# Patient Record
Sex: FEMALE | Race: WHITE | NOT HISPANIC OR LATINO | Employment: OTHER | URBAN - NONMETROPOLITAN AREA
[De-identification: names, ages, dates, MRNs, and addresses within clinical notes are randomized per-mention and may not be internally consistent; named-entity substitution may affect disease eponyms.]

---

## 2018-08-29 ENCOUNTER — OFFICE VISIT (OUTPATIENT)
Dept: OBSTETRICS AND GYNECOLOGY | Facility: CLINIC | Age: 53
End: 2018-08-29

## 2018-08-29 VITALS
SYSTOLIC BLOOD PRESSURE: 164 MMHG | HEIGHT: 66 IN | BODY MASS INDEX: 29.57 KG/M2 | DIASTOLIC BLOOD PRESSURE: 100 MMHG | WEIGHT: 184 LBS

## 2018-08-29 DIAGNOSIS — N95.1 MENOPAUSAL SYMPTOMS: Primary | ICD-10-CM

## 2018-08-29 PROCEDURE — 99204 OFFICE O/P NEW MOD 45 MIN: CPT | Performed by: OBSTETRICS & GYNECOLOGY

## 2018-08-29 NOTE — PROGRESS NOTES
Subjective   Chief Complaint   Patient presents with   • HRT     Pt is interested in starting hormones (weight gain, sweating, not sleeping, mood swings, no sex drive)     Nicole Silav is a 53 y.o. year old .  No LMP recorded. Patient is postmenopausal.  She presents to be seen because of vasomotor symptoms. Mood lability, decrease libido, hot flushes. LMP 11 years ago. NO sig cardiac history in family.     PAP smear WNL   MMG WNL 10/17    OTHER COMPLAINTS:  Nothing else    The following portions of the patient's history were reviewed and updated as appropriate:  She  has a past medical history of Hypertension.  She  does not have a problem list on file.  She  has a past surgical history that includes  section (,).  Her family history includes Ovarian cancer in her mother.  She  reports that she has quit smoking. She has never used smokeless tobacco. She reports that she does not drink alcohol or use drugs.  No current outpatient prescriptions on file.     No current facility-administered medications for this visit.      No current outpatient prescriptions on file prior to visit.     No current facility-administered medications on file prior to visit.      She has No Known Allergies.    Smoking status: Former Smoker                                                              Packs/day: 0.00      Years: 0.00      Smokeless tobacco: Never Used                        Review of Systems  Consitutional POS: nothing reported    NEG: anorexia or night sweats   Gastointestinal POS: nothing reported    NEG: bloating, change in bowel habits, melena or reflux symptoms   Genitourinary POS: nothing reported    NEG: dysuria or hematuria   Integument POS: nothing reported    NEG: moles that are changing in size, shape, color or rashes   Breast POS: nothing reported    NEG: persistent breast lump, skin dimpling or nipple discharge         Eyes: negative  Ears, nose, mouth, throat, and face:  "negative  Respiratory: negative  Cardiovascular: negative  GYN:  negative  Hematologic/lymphatic: negative  Musculoskeletal:negative  Neurological: negative  Behavioral/Psych: negative  Endocrine: negative  Allergic/Immunologic: negative          Objective   /100   Ht 167.6 cm (66\")   Wt 83.5 kg (184 lb)   BMI 29.70 kg/m²     General:  well developed; well nourished  no acute distress   Skin:  No suspicious lesions seen   Thyroid: normal to inspection and palpation   Lungs:  breathing is unlabored  clear to auscultation bilaterally   Heart:  regular rate and rhythm, S1, S2 normal, no murmur, click, rub or gallop   Breasts:  Not performed.   Abdomen: soft, non-tender; no masses  no umbilical or inginual hernias are present  no hepato-splenomegaly   Pelvis: Not performed.     Psychiatric: Alert and oriented ×3, mood and affect appropriate  HEENT: Atraumatic, normocephalic, normal scleral icterus  Extremities: 2+ pulses bilaterally, no edema      Lab Review   No data reviewed    Imaging   No data reviewed        Assessment   1. Vasomotor symptoms     Plan   1. Labs otday, if normal will start Climara patch q week, PCP for BP check  2. R/B A  3. 6 weeks      No orders of the defined types were placed in this encounter.         This note was electronically signed.      August 29, 2018      "

## 2018-08-30 ENCOUNTER — TELEPHONE (OUTPATIENT)
Dept: OBSTETRICS AND GYNECOLOGY | Facility: CLINIC | Age: 53
End: 2018-08-30

## 2018-08-30 LAB
ALBUMIN SERPL-MCNC: 4.4 G/DL (ref 3.5–5)
ALBUMIN/GLOB SERPL: 1.8 G/DL (ref 1–2)
ALP SERPL-CCNC: 112 U/L (ref 38–126)
ALT SERPL-CCNC: 32 U/L (ref 13–69)
AST SERPL-CCNC: 27 U/L (ref 15–46)
BILIRUB SERPL-MCNC: 0.6 MG/DL (ref 0.2–1.3)
BUN SERPL-MCNC: 13 MG/DL (ref 7–20)
BUN/CREAT SERPL: 21.7 (ref 7.1–23.5)
CALCIUM SERPL-MCNC: 9.3 MG/DL (ref 8.4–10.2)
CHLORIDE SERPL-SCNC: 110 MMOL/L (ref 98–107)
CO2 SERPL-SCNC: 22 MMOL/L (ref 26–30)
CREAT SERPL-MCNC: 0.6 MG/DL (ref 0.6–1.3)
ERYTHROCYTE [DISTWIDTH] IN BLOOD BY AUTOMATED COUNT: 13.9 % (ref 11.5–14.5)
ESTRADIOL SERPL-MCNC: 10 PG/ML
FSH SERPL-ACNC: 75.5 MIU/ML
GLOBULIN SER CALC-MCNC: 2.4 GM/DL
GLUCOSE SERPL-MCNC: 93 MG/DL (ref 74–98)
HBA1C MFR BLD: 5.5 %
HCT VFR BLD AUTO: 47.3 % (ref 37–47)
HGB BLD-MCNC: 15.6 G/DL (ref 12–16)
MCH RBC QN AUTO: 30.8 PG (ref 27–31)
MCHC RBC AUTO-ENTMCNC: 33 G/DL (ref 30–37)
MCV RBC AUTO: 93.3 FL (ref 81–99)
PLATELET # BLD AUTO: 181 10*3/MM3 (ref 130–400)
POTASSIUM SERPL-SCNC: 3.9 MMOL/L (ref 3.5–5.1)
PROT SERPL-MCNC: 6.8 G/DL (ref 6.3–8.2)
RBC # BLD AUTO: 5.07 10*6/MM3 (ref 4.2–5.4)
SODIUM SERPL-SCNC: 143 MMOL/L (ref 137–145)
TSH SERPL DL<=0.005 MIU/L-ACNC: 1.1 MIU/ML (ref 0.47–4.68)
WBC # BLD AUTO: 8.5 10*3/MM3 (ref 4.8–10.8)

## 2018-12-12 ENCOUNTER — OFFICE VISIT (OUTPATIENT)
Dept: OBSTETRICS AND GYNECOLOGY | Facility: CLINIC | Age: 53
End: 2018-12-12

## 2018-12-12 VITALS
DIASTOLIC BLOOD PRESSURE: 70 MMHG | HEIGHT: 66 IN | SYSTOLIC BLOOD PRESSURE: 118 MMHG | BODY MASS INDEX: 28.93 KG/M2 | WEIGHT: 180 LBS

## 2018-12-12 DIAGNOSIS — N95.1 MENOPAUSAL SYMPTOMS: Primary | ICD-10-CM

## 2018-12-12 PROCEDURE — 99213 OFFICE O/P EST LOW 20 MIN: CPT | Performed by: OBSTETRICS & GYNECOLOGY

## 2018-12-12 RX ORDER — VENLAFAXINE HYDROCHLORIDE 75 MG/1
150 CAPSULE, EXTENDED RELEASE ORAL
COMMUNITY
Start: 2015-08-10

## 2018-12-12 RX ORDER — ALBUTEROL SULFATE 90 UG/1
AEROSOL, METERED RESPIRATORY (INHALATION)
COMMUNITY

## 2018-12-12 RX ORDER — BENZONATATE 100 MG/1
CAPSULE ORAL
COMMUNITY
Start: 2018-11-20

## 2018-12-12 RX ORDER — AZITHROMYCIN 250 MG/1
TABLET, FILM COATED ORAL
COMMUNITY
Start: 2018-11-20

## 2018-12-12 RX ORDER — MONTELUKAST SODIUM 10 MG/1
10 TABLET ORAL
COMMUNITY
Start: 2015-07-31

## 2018-12-12 RX ORDER — ENALAPRIL MALEATE 10 MG/1
10 TABLET ORAL
COMMUNITY
Start: 2016-01-20

## 2018-12-12 RX ORDER — CYCLOBENZAPRINE HCL 10 MG
10 TABLET ORAL
COMMUNITY
Start: 2015-07-31

## 2018-12-12 NOTE — PROGRESS NOTES
Subjective   Chief Complaint   Patient presents with   • Follow-up     HRT- Climara(Started in August)- No Complaints     Nicole Silva is a 53 y.o. year old .  No LMP recorded. Patient is postmenopausal.  She presents to be seen because of F/U CLIMARA PATCH. Doing well, sig reduction in symptoms. BP muich better as well. No Vb'ing    OTHER COMPLAINTS:  Nothing else    The following portions of the patient's history were reviewed and updated as appropriate:  She  has a past medical history of Hypertension.  She does not have a problem list on file.  She  has a past surgical history that includes  section (,).  Her family history includes Ovarian cancer in her mother.  She  reports that she has quit smoking. she has never used smokeless tobacco. She reports that she does not drink alcohol or use drugs.  Current Outpatient Medications   Medication Sig Dispense Refill   • cyclobenzaprine (FLEXERIL) 10 MG tablet Take 10 mg by mouth.     • enalapril (VASOTEC) 10 MG tablet Take 10 mg by mouth.     • HYDROcod Polst-CPM Polst ER (TUSSIONEX PENNKINETIC) 10-8 MG/5ML ER suspension Take 5 mL by mouth.     • montelukast (SINGULAIR) 10 MG tablet Take 10 mg by mouth.     • venlafaxine XR (EFFEXOR-XR) 75 MG 24 hr capsule Take 150 mg by mouth.     • albuterol 108 (90 Base) MCG/ACT inhaler Inhale.     • azithromycin (ZITHROMAX) 250 MG tablet      • benzonatate (TESSALON) 100 MG capsule      • estradiol (CLIMARA) 0.025 MG/24HR patch Place 1 patch on the skin as directed by provider 1 (One) Time Per Week. 4 each 3     No current facility-administered medications for this visit.      Current Outpatient Medications on File Prior to Visit   Medication Sig   • cyclobenzaprine (FLEXERIL) 10 MG tablet Take 10 mg by mouth.   • enalapril (VASOTEC) 10 MG tablet Take 10 mg by mouth.   • HYDROcod Polst-CPM Polst ER (TUSSIONEX PENNKINETIC) 10-8 MG/5ML ER suspension Take 5 mL by mouth.   • montelukast (SINGULAIR) 10 MG tablet  "Take 10 mg by mouth.   • venlafaxine XR (EFFEXOR-XR) 75 MG 24 hr capsule Take 150 mg by mouth.   • albuterol 108 (90 Base) MCG/ACT inhaler Inhale.   • azithromycin (ZITHROMAX) 250 MG tablet    • benzonatate (TESSALON) 100 MG capsule    • estradiol (CLIMARA) 0.025 MG/24HR patch Place 1 patch on the skin as directed by provider 1 (One) Time Per Week.     No current facility-administered medications on file prior to visit.      She has No Known Allergies.    Social History    Tobacco Use      Smoking status: Former Smoker      Smokeless tobacco: Never Used    Review of Systems  Consitutional POS: nothing reported    NEG: anorexia or night sweats   Gastointestinal POS: nothing reported    NEG: bloating, change in bowel habits, melena or reflux symptoms   Genitourinary POS: nothing reported    NEG: dysuria or hematuria   Integument POS: nothing reported    NEG: moles that are changing in size, shape, color or rashes   Breast POS: nothing reported    NEG: persistent breast lump, skin dimpling or nipple discharge         Respiratory: negative  Cardiovascular: negative          Objective   /70   Ht 167.6 cm (66\")   Wt 81.6 kg (180 lb)   BMI 29.05 kg/m²     General:  well developed; well nourished  no acute distress   Skin:  No suspicious lesions seen   Thyroid: not examined   Lungs:  breathing is unlabored   Heart:  Not performed.   Breasts:  Not performed.   Abdomen: soft, non-tender; no masses  no umbilical or inguinal hernias are present  no hepato-splenomegaly   Pelvis: Not performed.     Psychiatric: Alert and oriented ×3, mood and affect appropriate  HEENT: Atraumatic, normocephalic, normal scleral icterus  Extremities: 2+ pulses bilaterally, no edema      Lab Review   No data reviewed    Imaging   No data reviewed        Assessment   1. Vasomotor symptoms imporved, patient has not had hyst-- will need progesterone     Plan   1. Add prometrium 100mg po qday. Cont current dosing Climara 0.025ng patch " weekly  2.  Needs MMG and PAP--getting through PCP    No orders of the defined types were placed in this encounter.         This note was electronically signed.      December 12, 2018

## 2019-01-07 ENCOUNTER — OFFICE VISIT (OUTPATIENT)
Dept: FAMILY MEDICINE CLINIC | Age: 54
End: 2019-01-07
Payer: COMMERCIAL

## 2019-01-07 ENCOUNTER — HOSPITAL ENCOUNTER (OUTPATIENT)
Facility: HOSPITAL | Age: 54
Discharge: HOME | End: 2019-01-07
Payer: COMMERCIAL

## 2019-01-07 VITALS
SYSTOLIC BLOOD PRESSURE: 150 MMHG | DIASTOLIC BLOOD PRESSURE: 90 MMHG | RESPIRATION RATE: 16 BRPM | OXYGEN SATURATION: 96 % | BODY MASS INDEX: 30.39 KG/M2 | WEIGHT: 178 LBS | HEIGHT: 64 IN | HEART RATE: 90 BPM

## 2019-01-07 DIAGNOSIS — I10 ESSENTIAL HYPERTENSION: Primary | ICD-10-CM

## 2019-01-07 DIAGNOSIS — Z12.39 BREAST CANCER SCREENING: ICD-10-CM

## 2019-01-07 DIAGNOSIS — Z13.220 LIPID SCREENING: ICD-10-CM

## 2019-01-07 DIAGNOSIS — Z23 NEED FOR TETANUS BOOSTER: ICD-10-CM

## 2019-01-07 DIAGNOSIS — F41.9 ANXIETY: ICD-10-CM

## 2019-01-07 DIAGNOSIS — J30.89 NON-SEASONAL ALLERGIC RHINITIS, UNSPECIFIED TRIGGER: ICD-10-CM

## 2019-01-07 DIAGNOSIS — Z23 NEED FOR INFLUENZA VACCINATION: ICD-10-CM

## 2019-01-07 DIAGNOSIS — M62.838 CERVICAL PARASPINAL MUSCLE SPASM: ICD-10-CM

## 2019-01-07 DIAGNOSIS — Z12.11 COLON CANCER SCREENING: ICD-10-CM

## 2019-01-07 DIAGNOSIS — I10 ESSENTIAL HYPERTENSION: ICD-10-CM

## 2019-01-07 PROBLEM — G89.29 CHRONIC PAIN OF BOTH KNEES: Status: ACTIVE | Noted: 2019-01-07

## 2019-01-07 PROBLEM — M25.561 CHRONIC PAIN OF BOTH KNEES: Status: ACTIVE | Noted: 2019-01-07

## 2019-01-07 PROBLEM — M25.562 CHRONIC PAIN OF BOTH KNEES: Status: ACTIVE | Noted: 2019-01-07

## 2019-01-07 LAB
A/G RATIO: 2 (ref 0.8–2)
ALBUMIN SERPL-MCNC: 4.8 G/DL (ref 3.4–4.8)
ALP BLD-CCNC: 102 U/L (ref 25–100)
ALT SERPL-CCNC: 21 U/L (ref 4–36)
ANION GAP SERPL CALCULATED.3IONS-SCNC: 14 MMOL/L (ref 3–16)
AST SERPL-CCNC: 23 U/L (ref 8–33)
BASOPHILS ABSOLUTE: 0.1 K/UL (ref 0–0.1)
BASOPHILS RELATIVE PERCENT: 0.7 %
BILIRUB SERPL-MCNC: 0.6 MG/DL (ref 0.3–1.2)
BUN BLDV-MCNC: 15 MG/DL (ref 6–20)
CALCIUM SERPL-MCNC: 9.3 MG/DL (ref 8.5–10.5)
CHLORIDE BLD-SCNC: 102 MMOL/L (ref 98–107)
CHOLESTEROL, TOTAL: 266 MG/DL (ref 0–200)
CO2: 24 MMOL/L (ref 20–30)
CREAT SERPL-MCNC: 0.6 MG/DL (ref 0.4–1.2)
EOSINOPHILS ABSOLUTE: 0.2 K/UL (ref 0–0.4)
EOSINOPHILS RELATIVE PERCENT: 1.7 %
GFR AFRICAN AMERICAN: >59
GFR NON-AFRICAN AMERICAN: >60
GLOBULIN: 2.4 G/DL
GLUCOSE BLD-MCNC: 79 MG/DL (ref 74–106)
HCT VFR BLD CALC: 52.4 % (ref 37–47)
HDLC SERPL-MCNC: 94 MG/DL (ref 40–60)
HEMOGLOBIN: 16.6 G/DL (ref 11.5–16.5)
IMMATURE GRANULOCYTES #: 0.1 K/UL
IMMATURE GRANULOCYTES %: 0.5 % (ref 0–5)
LDL CHOLESTEROL CALCULATED: 154 MG/DL
LYMPHOCYTES ABSOLUTE: 2.7 K/UL (ref 1.5–4)
LYMPHOCYTES RELATIVE PERCENT: 23.7 %
MCH RBC QN AUTO: 30.4 PG (ref 27–32)
MCHC RBC AUTO-ENTMCNC: 31.7 G/DL (ref 31–35)
MCV RBC AUTO: 96 FL (ref 80–100)
MONOCYTES ABSOLUTE: 0.9 K/UL (ref 0.2–0.8)
MONOCYTES RELATIVE PERCENT: 7.7 %
NEUTROPHILS ABSOLUTE: 7.6 K/UL (ref 2–7.5)
NEUTROPHILS RELATIVE PERCENT: 65.7 %
PDW BLD-RTO: 14.3 % (ref 11–16)
PLATELET # BLD: 204 K/UL (ref 150–400)
PMV BLD AUTO: 11.7 FL (ref 6–10)
POTASSIUM SERPL-SCNC: 4.1 MMOL/L (ref 3.4–5.1)
RBC # BLD: 5.46 M/UL (ref 3.8–5.8)
SODIUM BLD-SCNC: 140 MMOL/L (ref 136–145)
TOTAL PROTEIN: 7.2 G/DL (ref 6.4–8.3)
TRIGL SERPL-MCNC: 90 MG/DL (ref 0–249)
TSH REFLEX: 0.53 UIU/ML (ref 0.35–5.5)
VLDLC SERPL CALC-MCNC: 18 MG/DL
WBC # BLD: 11.5 K/UL (ref 4–11)

## 2019-01-07 PROCEDURE — 90472 IMMUNIZATION ADMIN EACH ADD: CPT | Performed by: NURSE PRACTITIONER

## 2019-01-07 PROCEDURE — 3017F COLORECTAL CA SCREEN DOC REV: CPT | Performed by: NURSE PRACTITIONER

## 2019-01-07 PROCEDURE — 90715 TDAP VACCINE 7 YRS/> IM: CPT | Performed by: NURSE PRACTITIONER

## 2019-01-07 PROCEDURE — 90688 IIV4 VACCINE SPLT 0.5 ML IM: CPT | Performed by: NURSE PRACTITIONER

## 2019-01-07 PROCEDURE — 80053 COMPREHEN METABOLIC PANEL: CPT

## 2019-01-07 PROCEDURE — 99214 OFFICE O/P EST MOD 30 MIN: CPT | Performed by: NURSE PRACTITIONER

## 2019-01-07 PROCEDURE — 4004F PT TOBACCO SCREEN RCVD TLK: CPT | Performed by: NURSE PRACTITIONER

## 2019-01-07 PROCEDURE — 36415 COLL VENOUS BLD VENIPUNCTURE: CPT

## 2019-01-07 PROCEDURE — 90471 IMMUNIZATION ADMIN: CPT | Performed by: NURSE PRACTITIONER

## 2019-01-07 PROCEDURE — G8482 FLU IMMUNIZE ORDER/ADMIN: HCPCS | Performed by: NURSE PRACTITIONER

## 2019-01-07 PROCEDURE — 84443 ASSAY THYROID STIM HORMONE: CPT

## 2019-01-07 PROCEDURE — 80061 LIPID PANEL: CPT

## 2019-01-07 PROCEDURE — G8417 CALC BMI ABV UP PARAM F/U: HCPCS | Performed by: NURSE PRACTITIONER

## 2019-01-07 PROCEDURE — 85025 COMPLETE CBC W/AUTO DIFF WBC: CPT

## 2019-01-07 PROCEDURE — G8427 DOCREV CUR MEDS BY ELIG CLIN: HCPCS | Performed by: NURSE PRACTITIONER

## 2019-01-07 RX ORDER — CYCLOBENZAPRINE HCL 10 MG
10 TABLET ORAL
COMMUNITY
Start: 2015-07-31 | End: 2019-02-12 | Stop reason: ALTCHOICE

## 2019-01-07 RX ORDER — VENLAFAXINE HYDROCHLORIDE 37.5 MG/1
37.5 CAPSULE, EXTENDED RELEASE ORAL DAILY
Qty: 30 CAPSULE | Refills: 0 | Status: SHIPPED | OUTPATIENT
Start: 2019-01-07 | End: 2019-02-12 | Stop reason: DRUGHIGH

## 2019-01-07 RX ORDER — TIZANIDINE HYDROCHLORIDE 4 MG/1
4 CAPSULE, GELATIN COATED ORAL 3 TIMES DAILY PRN
Qty: 90 CAPSULE | Refills: 0 | Status: SHIPPED | OUTPATIENT
Start: 2019-01-07 | End: 2019-02-06

## 2019-01-07 RX ORDER — ALBUTEROL SULFATE 90 UG/1
2 AEROSOL, METERED RESPIRATORY (INHALATION) EVERY 6 HOURS PRN
Qty: 1 INHALER | Refills: 3 | Status: SHIPPED | OUTPATIENT
Start: 2019-01-07 | End: 2019-10-15 | Stop reason: SDUPTHER

## 2019-01-07 RX ORDER — MONTELUKAST SODIUM 10 MG/1
10 TABLET ORAL NIGHTLY
Qty: 30 TABLET | Refills: 3 | Status: SHIPPED | OUTPATIENT
Start: 2019-01-07 | End: 2019-05-02 | Stop reason: SDUPTHER

## 2019-01-07 RX ORDER — LISINOPRIL 10 MG/1
10 TABLET ORAL DAILY
Qty: 30 TABLET | Refills: 3 | Status: SHIPPED | OUTPATIENT
Start: 2019-01-07 | End: 2019-05-02 | Stop reason: SDUPTHER

## 2019-01-07 RX ORDER — MONTELUKAST SODIUM 10 MG/1
10 TABLET ORAL
COMMUNITY
Start: 2015-07-31 | End: 2019-01-07 | Stop reason: SDUPTHER

## 2019-01-07 ASSESSMENT — PATIENT HEALTH QUESTIONNAIRE - PHQ9
1. LITTLE INTEREST OR PLEASURE IN DOING THINGS: 1
SUM OF ALL RESPONSES TO PHQ9 QUESTIONS 1 & 2: 2
SUM OF ALL RESPONSES TO PHQ QUESTIONS 1-9: 2
2. FEELING DOWN, DEPRESSED OR HOPELESS: 1
SUM OF ALL RESPONSES TO PHQ QUESTIONS 1-9: 2

## 2019-01-11 DIAGNOSIS — Z12.39 BREAST CANCER SCREENING: Primary | ICD-10-CM

## 2019-01-16 DIAGNOSIS — R92.8 ABNORMAL MAMMOGRAM: Primary | ICD-10-CM

## 2019-01-18 DIAGNOSIS — R92.8 ABNORMAL MAMMOGRAM: ICD-10-CM

## 2019-01-21 ASSESSMENT — ENCOUNTER SYMPTOMS
ALLERGIC/IMMUNOLOGIC NEGATIVE: 1
GASTROINTESTINAL NEGATIVE: 1
EYES NEGATIVE: 1
CHEST TIGHTNESS: 0
SHORTNESS OF BREATH: 0
COUGH: 0

## 2019-01-22 ENCOUNTER — TELEPHONE (OUTPATIENT)
Dept: FAMILY MEDICINE CLINIC | Age: 54
End: 2019-01-22

## 2019-01-22 DIAGNOSIS — N63.0 BREAST NODULE: Primary | ICD-10-CM

## 2019-02-12 ENCOUNTER — OFFICE VISIT (OUTPATIENT)
Dept: FAMILY MEDICINE CLINIC | Age: 54
End: 2019-02-12
Payer: COMMERCIAL

## 2019-02-12 VITALS
SYSTOLIC BLOOD PRESSURE: 122 MMHG | RESPIRATION RATE: 18 BRPM | BODY MASS INDEX: 30.9 KG/M2 | DIASTOLIC BLOOD PRESSURE: 80 MMHG | HEART RATE: 78 BPM | HEIGHT: 64 IN | OXYGEN SATURATION: 99 % | WEIGHT: 181 LBS

## 2019-02-12 DIAGNOSIS — J30.89 NON-SEASONAL ALLERGIC RHINITIS, UNSPECIFIED TRIGGER: ICD-10-CM

## 2019-02-12 DIAGNOSIS — N63.0 BREAST NODULE: ICD-10-CM

## 2019-02-12 DIAGNOSIS — F41.9 ANXIETY: ICD-10-CM

## 2019-02-12 DIAGNOSIS — M17.0 PRIMARY OSTEOARTHRITIS OF BOTH KNEES: Primary | ICD-10-CM

## 2019-02-12 PROBLEM — J30.9 ALLERGIC RHINITIS: Status: ACTIVE | Noted: 2019-02-12

## 2019-02-12 PROCEDURE — 3017F COLORECTAL CA SCREEN DOC REV: CPT | Performed by: NURSE PRACTITIONER

## 2019-02-12 PROCEDURE — 4004F PT TOBACCO SCREEN RCVD TLK: CPT | Performed by: NURSE PRACTITIONER

## 2019-02-12 PROCEDURE — G8482 FLU IMMUNIZE ORDER/ADMIN: HCPCS | Performed by: NURSE PRACTITIONER

## 2019-02-12 PROCEDURE — G8427 DOCREV CUR MEDS BY ELIG CLIN: HCPCS | Performed by: NURSE PRACTITIONER

## 2019-02-12 PROCEDURE — 99213 OFFICE O/P EST LOW 20 MIN: CPT | Performed by: NURSE PRACTITIONER

## 2019-02-12 PROCEDURE — G8417 CALC BMI ABV UP PARAM F/U: HCPCS | Performed by: NURSE PRACTITIONER

## 2019-02-12 RX ORDER — FLUTICASONE PROPIONATE 50 MCG
2 SPRAY, SUSPENSION (ML) NASAL DAILY
Qty: 3 BOTTLE | Refills: 3 | Status: SHIPPED | OUTPATIENT
Start: 2019-02-12 | End: 2019-10-15 | Stop reason: SDUPTHER

## 2019-02-12 RX ORDER — VENLAFAXINE HYDROCHLORIDE 75 MG/1
75 CAPSULE, EXTENDED RELEASE ORAL DAILY
Qty: 60 CAPSULE | Refills: 3 | Status: SHIPPED | OUTPATIENT
Start: 2019-02-12 | End: 2019-10-15

## 2019-02-12 RX ORDER — DICLOFENAC POTASSIUM 50 MG/1
50 TABLET, FILM COATED ORAL 3 TIMES DAILY
Qty: 90 TABLET | Refills: 3 | Status: SHIPPED | OUTPATIENT
Start: 2019-02-12 | End: 2019-10-15

## 2019-02-12 RX ORDER — TIZANIDINE 4 MG/1
TABLET ORAL
COMMUNITY
Start: 2019-01-11 | End: 2020-02-18 | Stop reason: SDUPTHER

## 2019-02-12 ASSESSMENT — ENCOUNTER SYMPTOMS: SHORTNESS OF BREATH: 0

## 2019-05-02 DIAGNOSIS — J30.89 NON-SEASONAL ALLERGIC RHINITIS, UNSPECIFIED TRIGGER: ICD-10-CM

## 2019-05-02 DIAGNOSIS — I10 ESSENTIAL HYPERTENSION: ICD-10-CM

## 2019-05-02 RX ORDER — MONTELUKAST SODIUM 10 MG/1
TABLET ORAL
Qty: 30 TABLET | Refills: 3 | Status: SHIPPED | OUTPATIENT
Start: 2019-05-02 | End: 2019-10-15 | Stop reason: SDUPTHER

## 2019-05-02 RX ORDER — LISINOPRIL 10 MG/1
TABLET ORAL
Qty: 30 TABLET | Refills: 3 | Status: SHIPPED | OUTPATIENT
Start: 2019-05-02 | End: 2019-10-15 | Stop reason: SDUPTHER

## 2019-05-02 NOTE — TELEPHONE ENCOUNTER
Refill request received from pharmacy.  Medication pending for approval.     Patient information below:      LDL Calculated (mg/dL)   Date Value   01/07/2019 154 (H)     AST (U/L)   Date Value   01/07/2019 23     ALT (U/L)   Date Value   01/07/2019 21     BUN (mg/dL)   Date Value   01/07/2019 15      (goal A1C is < 7)   (goal LDL is <100) need 30-50% reduction from baseline     BP Readings from Last 3 Encounters:   02/12/19 122/80   01/07/19 (!) 150/90   01/20/16 128/76    (goal /80)      All Future Testing planned in [facility]:  Lab Frequency Next Occurrence   POCT Fecal Immunochemical Test (FIT) Once 01/28/2019   KHANH DIGITAL SCREEN W OR WO CAD BILATERAL Once 01/11/2019       Last Office Visit With PCP:  2/12/2019      Next Visit Date:  Future Appointments   Date Time Provider Jeaneth Inman   5/13/2019  9:15 AM Donna Flores, APR[address]            Patient Active Problem List:     Primary osteoarthritis of both knees     Postmenopausal HRT (hormone replacement therapy)     Chronic pain of both knees     Allergic rhinitis     Anxiety     Breast nodule

## 2019-10-15 ENCOUNTER — OFFICE VISIT (OUTPATIENT)
Dept: FAMILY MEDICINE CLINIC | Age: 54
End: 2019-10-15
Payer: COMMERCIAL

## 2019-10-15 VITALS
BODY MASS INDEX: 30.22 KG/M2 | RESPIRATION RATE: 20 BRPM | DIASTOLIC BLOOD PRESSURE: 74 MMHG | OXYGEN SATURATION: 98 % | SYSTOLIC BLOOD PRESSURE: 130 MMHG | WEIGHT: 177 LBS | HEIGHT: 64 IN | HEART RATE: 91 BPM

## 2019-10-15 DIAGNOSIS — I10 ESSENTIAL HYPERTENSION: ICD-10-CM

## 2019-10-15 DIAGNOSIS — Z23 NEEDS FLU SHOT: ICD-10-CM

## 2019-10-15 DIAGNOSIS — R53.83 FATIGUE, UNSPECIFIED TYPE: ICD-10-CM

## 2019-10-15 DIAGNOSIS — G47.00 INSOMNIA, UNSPECIFIED TYPE: ICD-10-CM

## 2019-10-15 DIAGNOSIS — Z13.220 SCREENING FOR CHOLESTEROL LEVEL: ICD-10-CM

## 2019-10-15 DIAGNOSIS — E55.9 VITAMIN D DEFICIENCY: ICD-10-CM

## 2019-10-15 DIAGNOSIS — F41.9 ANXIETY: ICD-10-CM

## 2019-10-15 DIAGNOSIS — M17.0 PRIMARY OSTEOARTHRITIS OF BOTH KNEES: ICD-10-CM

## 2019-10-15 DIAGNOSIS — J30.89 NON-SEASONAL ALLERGIC RHINITIS, UNSPECIFIED TRIGGER: ICD-10-CM

## 2019-10-15 DIAGNOSIS — F32.A DEPRESSION, UNSPECIFIED DEPRESSION TYPE: Primary | ICD-10-CM

## 2019-10-15 PROCEDURE — G8482 FLU IMMUNIZE ORDER/ADMIN: HCPCS | Performed by: NURSE PRACTITIONER

## 2019-10-15 PROCEDURE — G8417 CALC BMI ABV UP PARAM F/U: HCPCS | Performed by: NURSE PRACTITIONER

## 2019-10-15 PROCEDURE — 99214 OFFICE O/P EST MOD 30 MIN: CPT | Performed by: NURSE PRACTITIONER

## 2019-10-15 PROCEDURE — 90471 IMMUNIZATION ADMIN: CPT | Performed by: NURSE PRACTITIONER

## 2019-10-15 PROCEDURE — 3017F COLORECTAL CA SCREEN DOC REV: CPT | Performed by: NURSE PRACTITIONER

## 2019-10-15 PROCEDURE — 90688 IIV4 VACCINE SPLT 0.5 ML IM: CPT | Performed by: NURSE PRACTITIONER

## 2019-10-15 PROCEDURE — G8427 DOCREV CUR MEDS BY ELIG CLIN: HCPCS | Performed by: NURSE PRACTITIONER

## 2019-10-15 PROCEDURE — 4004F PT TOBACCO SCREEN RCVD TLK: CPT | Performed by: NURSE PRACTITIONER

## 2019-10-15 RX ORDER — DICLOFENAC POTASSIUM 50 MG/1
50 TABLET, FILM COATED ORAL 3 TIMES DAILY
Qty: 90 TABLET | Refills: 3 | Status: CANCELLED | OUTPATIENT
Start: 2019-10-15

## 2019-10-15 RX ORDER — LORATADINE 10 MG/1
10 TABLET ORAL DAILY
Qty: 90 TABLET | Refills: 0 | Status: SHIPPED | OUTPATIENT
Start: 2019-10-15 | End: 2020-02-18

## 2019-10-15 RX ORDER — LISINOPRIL 10 MG/1
TABLET ORAL
Qty: 30 TABLET | Refills: 3 | Status: SHIPPED | OUTPATIENT
Start: 2019-10-15 | End: 2020-02-17

## 2019-10-15 RX ORDER — CLONAZEPAM 0.5 MG/1
0.5 TABLET, ORALLY DISINTEGRATING ORAL 2 TIMES DAILY PRN
Qty: 60 TABLET | Refills: 1 | Status: SHIPPED | OUTPATIENT
Start: 2019-10-15 | End: 2020-01-06

## 2019-10-15 RX ORDER — VENLAFAXINE HYDROCHLORIDE 75 MG/1
75 CAPSULE, EXTENDED RELEASE ORAL DAILY
Qty: 60 CAPSULE | Refills: 3 | Status: CANCELLED | OUTPATIENT
Start: 2019-10-15

## 2019-10-15 RX ORDER — FLUTICASONE PROPIONATE 50 MCG
2 SPRAY, SUSPENSION (ML) NASAL DAILY
Qty: 3 BOTTLE | Refills: 3 | Status: SHIPPED | OUTPATIENT
Start: 2019-10-15 | End: 2020-02-18 | Stop reason: SDUPTHER

## 2019-10-15 RX ORDER — VILAZODONE HYDROCHLORIDE 20 MG/1
20 TABLET ORAL DAILY
Qty: 90 TABLET | Refills: 0 | Status: SHIPPED | OUTPATIENT
Start: 2019-10-15 | End: 2020-02-18 | Stop reason: SDUPTHER

## 2019-10-15 RX ORDER — MONTELUKAST SODIUM 10 MG/1
TABLET ORAL
Qty: 30 TABLET | Refills: 3 | Status: SHIPPED | OUTPATIENT
Start: 2019-10-15 | End: 2020-02-17

## 2019-10-15 RX ORDER — ALBUTEROL SULFATE 90 UG/1
2 AEROSOL, METERED RESPIRATORY (INHALATION) EVERY 6 HOURS PRN
Qty: 1 INHALER | Refills: 3 | Status: SHIPPED | OUTPATIENT
Start: 2019-10-15 | End: 2020-02-18 | Stop reason: SDUPTHER

## 2019-10-15 ASSESSMENT — ENCOUNTER SYMPTOMS
EYE REDNESS: 0
EYE PAIN: 0
SORE THROAT: 0
VOMITING: 0
NAUSEA: 0
COUGH: 0
ABDOMINAL PAIN: 0
DIARRHEA: 0
SHORTNESS OF BREATH: 0
CHEST TIGHTNESS: 0
TROUBLE SWALLOWING: 0
COLOR CHANGE: 0
BACK PAIN: 0

## 2020-01-06 ENCOUNTER — TELEPHONE (OUTPATIENT)
Dept: FAMILY MEDICINE CLINIC | Age: 55
End: 2020-01-06

## 2020-01-06 RX ORDER — CLONAZEPAM 0.5 MG/1
TABLET ORAL
Qty: 60 TABLET | Refills: 0 | Status: SHIPPED | OUTPATIENT
Start: 2020-01-06 | End: 2020-02-18 | Stop reason: SDUPTHER

## 2020-01-06 NOTE — TELEPHONE ENCOUNTER
Refill request received from pharmacy. Medication pending for approval.       Last Office Visit With PCP:  10/15/2019    Next Visit Date:  No future appointments.     Abdirizak Barr for review

## 2020-02-17 RX ORDER — LISINOPRIL 10 MG/1
TABLET ORAL
Qty: 30 TABLET | Refills: 2 | Status: SHIPPED | OUTPATIENT
Start: 2020-02-17 | End: 2020-05-18 | Stop reason: SDUPTHER

## 2020-02-17 RX ORDER — MONTELUKAST SODIUM 10 MG/1
TABLET ORAL
Qty: 30 TABLET | Refills: 2 | Status: SHIPPED | OUTPATIENT
Start: 2020-02-17 | End: 2020-05-18 | Stop reason: SDUPTHER

## 2020-02-18 ENCOUNTER — OFFICE VISIT (OUTPATIENT)
Dept: FAMILY MEDICINE CLINIC | Age: 55
End: 2020-02-18
Payer: MEDICAID

## 2020-02-18 VITALS
WEIGHT: 177 LBS | SYSTOLIC BLOOD PRESSURE: 128 MMHG | BODY MASS INDEX: 30.22 KG/M2 | RESPIRATION RATE: 18 BRPM | OXYGEN SATURATION: 98 % | HEIGHT: 64 IN | HEART RATE: 76 BPM | DIASTOLIC BLOOD PRESSURE: 78 MMHG

## 2020-02-18 PROCEDURE — 99214 OFFICE O/P EST MOD 30 MIN: CPT | Performed by: NURSE PRACTITIONER

## 2020-02-18 RX ORDER — CLONAZEPAM 0.5 MG/1
0.5 TABLET ORAL 3 TIMES DAILY PRN
Qty: 90 TABLET | Refills: 0 | Status: SHIPPED | OUTPATIENT
Start: 2020-02-18 | End: 2020-03-26

## 2020-02-18 RX ORDER — CLONAZEPAM 0.5 MG/1
TABLET ORAL
Qty: 60 TABLET | Refills: 0 | Status: SHIPPED | OUTPATIENT
Start: 2020-02-18 | End: 2020-02-18

## 2020-02-18 RX ORDER — TIZANIDINE 4 MG/1
4 TABLET ORAL EVERY 6 HOURS PRN
Qty: 90 TABLET | Refills: 2 | Status: SHIPPED | OUTPATIENT
Start: 2020-02-18 | End: 2021-04-08

## 2020-02-18 RX ORDER — FLUTICASONE PROPIONATE 50 MCG
2 SPRAY, SUSPENSION (ML) NASAL DAILY
Qty: 3 BOTTLE | Refills: 3 | Status: SHIPPED | OUTPATIENT
Start: 2020-02-18 | End: 2020-06-01

## 2020-02-18 RX ORDER — VILAZODONE HYDROCHLORIDE 20 MG/1
20 TABLET ORAL DAILY
Qty: 90 TABLET | Refills: 0 | Status: SHIPPED | OUTPATIENT
Start: 2020-02-18 | End: 2021-04-08

## 2020-02-18 RX ORDER — ALBUTEROL SULFATE 90 UG/1
2 AEROSOL, METERED RESPIRATORY (INHALATION) EVERY 6 HOURS PRN
Qty: 1 INHALER | Refills: 3 | Status: SHIPPED | OUTPATIENT
Start: 2020-02-18 | End: 2020-05-18 | Stop reason: SDUPTHER

## 2020-02-18 ASSESSMENT — ENCOUNTER SYMPTOMS
BACK PAIN: 0
VOMITING: 0
DIARRHEA: 0
COLOR CHANGE: 0
EYE PAIN: 0
NAUSEA: 0
CHEST TIGHTNESS: 0
TROUBLE SWALLOWING: 0
COUGH: 0
SHORTNESS OF BREATH: 0
EYE REDNESS: 0
SORE THROAT: 0
ABDOMINAL PAIN: 0

## 2020-02-18 ASSESSMENT — PATIENT HEALTH QUESTIONNAIRE - PHQ9
1. LITTLE INTEREST OR PLEASURE IN DOING THINGS: 1
SUM OF ALL RESPONSES TO PHQ QUESTIONS 1-9: 2
2. FEELING DOWN, DEPRESSED OR HOPELESS: 1
SUM OF ALL RESPONSES TO PHQ9 QUESTIONS 1 & 2: 2
SUM OF ALL RESPONSES TO PHQ QUESTIONS 1-9: 2

## 2020-02-18 NOTE — PROGRESS NOTES
m)   Wt 177 lb (80.3 kg)   SpO2 98% Comment: room air\  Breastfeeding No   BMI 30.38 kg/m²       Physical Exam  Vitals signs and nursing note reviewed. Constitutional:       General: She is not in acute distress. Appearance: Normal appearance. She is well-developed. She is not ill-appearing, toxic-appearing or diaphoretic. HENT:      Head: Normocephalic and atraumatic. Right Ear: Hearing, tympanic membrane, ear canal and external ear normal. There is no impacted cerumen. Left Ear: Hearing, tympanic membrane, ear canal and external ear normal. There is no impacted cerumen. Nose: Nose normal. No laceration, mucosal edema, congestion or rhinorrhea. Right Sinus: No maxillary sinus tenderness or frontal sinus tenderness. Left Sinus: No maxillary sinus tenderness or frontal sinus tenderness. Mouth/Throat:      Mouth: Mucous membranes are moist.      Dentition: Normal dentition. No dental caries. Pharynx: Oropharynx is clear. Uvula midline. No oropharyngeal exudate or posterior oropharyngeal erythema. Tonsils: No tonsillar exudate. Eyes:      General: Lids are normal. No scleral icterus. Right eye: No discharge. Left eye: No discharge. Extraocular Movements: Extraocular movements intact. Conjunctiva/sclera: Conjunctivae normal.      Pupils: Pupils are equal, round, and reactive to light. Neck:      Musculoskeletal: Full passive range of motion without pain, normal range of motion and neck supple. Thyroid: No thyroid mass or thyromegaly. Vascular: Normal carotid pulses. No carotid bruit, hepatojugular reflux or JVD. Trachea: Trachea and phonation normal. No tracheal tenderness or tracheal deviation. Cardiovascular:      Rate and Rhythm: Normal rate and regular rhythm. Pulses: Normal pulses. Heart sounds: Normal heart sounds, S1 normal and S2 normal. Heart sounds not distant. No murmur. No friction rub. No gallop. Lab Results   Component Value Date    TSH 0.70 05/08/2014          ASSESSMENT/PLAN:     Diandra Gonzalez was seen today for depression and hypertension. Diagnoses and all orders for this visit:    Anxiety  -     Discontinue: clonazePAM (KLONOPIN) 0.5 MG tablet; DISSOLVE ONE TABLET BY MOUTH TWICE A DAY AS NEEDED FOR ANXIETY  -     clonazePAM (KLONOPIN) 0.5 MG tablet; Take 1 tablet by mouth 3 times daily as needed (insomnia, panic attacks.) for up to 30 days. DC previously sent BID order. Depression, unspecified depression type  -     Discontinue: clonazePAM (KLONOPIN) 0.5 MG tablet; DISSOLVE ONE TABLET BY MOUTH TWICE A DAY AS NEEDED FOR ANXIETY  -     vilazodone HCl (VIIBRYD) 20 MG TABS; Take 1 tablet by mouth daily  -     clonazePAM (KLONOPIN) 0.5 MG tablet; Take 1 tablet by mouth 3 times daily as needed (insomnia, panic attacks.) for up to 30 days. DC previously sent BID order. Insomnia, unspecified type  -     Discontinue: clonazePAM (KLONOPIN) 0.5 MG tablet; DISSOLVE ONE TABLET BY MOUTH TWICE A DAY AS NEEDED FOR ANXIETY  -     clonazePAM (KLONOPIN) 0.5 MG tablet; Take 1 tablet by mouth 3 times daily as needed (insomnia, panic attacks.) for up to 30 days. DC previously sent BID order. Non-seasonal allergic rhinitis, unspecified trigger  -     fluticasone (FLONASE) 50 MCG/ACT nasal spray; 2 sprays by Each Nostril route daily  -     albuterol sulfate  (90 Base) MCG/ACT inhaler; Inhale 2 puffs into the lungs every 6 hours as needed for Wheezing    Primary osteoarthritis of both knees  -     diclofenac (VOLTAREN) 50 MG EC tablet; Take 1 tablet by mouth 2 times daily    Other orders  -     tiZANidine (ZANAFLEX) 4 MG tablet; Take 1 tablet by mouth every 6 hours as needed (muscle spasms)  -     estradiol (CLIMARA) 0.025 MG/24HR; Place 1 patch onto the skin once a week  -     progesterone (PROMETRIUM) 100 MG capsule;  Take 1 capsule by mouth daily        Controlled Substances Monitoring:     RX Monitoring 2/18/2020   Attestation The Prescription Monitoring Report for this patient was reviewed today. Periodic Controlled Substance Monitoring -        PATIENT COUNSELING     Counseling was provided today regarding the following topics: Healthy eating habits, Regular exercise, substance abuse and healthy sleep habits. Discussed use, benefit, and side effects of prescribed medications. Barriers to medication compliance addressed. All patient questions answered. Patient voiced understanding. Medications Discontinued During This Encounter   Medication Reason    loratadine (CLARITIN) 10 MG tablet LIST CLEANUP    clonazePAM (KLONOPIN) 0.5 MG tablet REORDER    fluticasone (FLONASE) 50 MCG/ACT nasal spray REORDER    albuterol sulfate  (90 Base) MCG/ACT inhaler REORDER    diclofenac (VOLTAREN) 50 MG EC tablet REORDER    vilazodone HCl (VIIBRYD) 20 MG TABS REORDER    tiZANidine (ZANAFLEX) 4 MG tablet REORDER    estradiol (CLIMARA) 0.025 MG/24HR REORDER    progesterone (PROMETRIUM) 100 MG capsule REORDER    clonazePAM (KLONOPIN) 0.5 MG tablet        Return in about 3 months (around 5/18/2020). SANTINO Oseguera - CNP     Education was provided for discussed topics. Call the office with worsening complaints or any side effects to any medications. If an emergency please call 911.

## 2020-03-26 RX ORDER — CLONAZEPAM 0.5 MG/1
TABLET ORAL
Qty: 90 TABLET | Refills: 0 | Status: SHIPPED | OUTPATIENT
Start: 2020-03-26 | End: 2020-05-05

## 2020-05-05 PROCEDURE — G2012 BRIEF CHECK IN BY MD/QHP: HCPCS | Performed by: NURSE PRACTITIONER

## 2020-05-05 RX ORDER — CLONAZEPAM 0.5 MG/1
TABLET ORAL
Qty: 90 TABLET | Refills: 0 | Status: SHIPPED | OUTPATIENT
Start: 2020-05-05 | End: 2020-05-18

## 2020-05-05 NOTE — TELEPHONE ENCOUNTER
Patient called, requested refill. Last Office Visit Date:  2/18/2020    Next Office Visit Date:  Future Appointments   Date Time Provider Jeaneth Inman   5/18/2020  8:30 AM SANTINO Gray CNP [address] 5/5/2020    \[facility]\"" code : Brief communication technology-based service, e.g. virtual check-in, by a physician or other qualified health care professional who can report evaluation and management services, provided to an established patient, not originating from a related e/m service provided within the previous 7 days nor leading to an e/m service or procedure within the next 24 hours or soonest available appointment; 5-10 minutes of medical discussion. Jose Rafael Evin was seen today for medication refill.     Diagnoses and all orders for this visit:    Anxiety  -     clonazePAM (KLONOPIN) 0.5 MG tablet; TAKE ONE TABLET BY MOUTH THREE TIMES A DAY AS NEEDED FOR INSOMNIA/ PANIC ATTACKS FOR UP TO 30 DAYS    Depression, unspecified depression type  -     clonazePAM (KLONOPIN) 0.5 MG tablet; TAKE ONE TABLET BY MOUTH THREE TIMES A DAY AS NEEDED FOR INSOMNIA/ PANIC ATTACKS FOR UP TO 30 DAYS    Insomnia, unspecified type  -     clonazePAM (KLONOPIN) 0.5 MG tablet; TAKE ONE TABLET BY MOUTH THREE TIMES A DAY AS NEEDED FOR INSOMNIA/ PANIC ATTACKS FOR UP TO 30 DAYS

## 2020-05-18 ENCOUNTER — VIRTUAL VISIT (OUTPATIENT)
Dept: FAMILY MEDICINE CLINIC | Age: 55
End: 2020-05-18
Payer: MEDICAID

## 2020-05-18 PROCEDURE — 99214 OFFICE O/P EST MOD 30 MIN: CPT | Performed by: NURSE PRACTITIONER

## 2020-05-18 RX ORDER — MONTELUKAST SODIUM 10 MG/1
TABLET ORAL
Qty: 30 TABLET | Refills: 2 | Status: SHIPPED | OUTPATIENT
Start: 2020-05-18 | End: 2020-08-13

## 2020-05-18 RX ORDER — LISINOPRIL 10 MG/1
TABLET ORAL
Qty: 30 TABLET | Refills: 2 | Status: SHIPPED | OUTPATIENT
Start: 2020-05-18 | End: 2020-08-13

## 2020-05-18 RX ORDER — CLONAZEPAM 1 MG/1
1 TABLET ORAL 3 TIMES DAILY PRN
Qty: 90 TABLET | Refills: 0 | Status: SHIPPED | OUTPATIENT
Start: 2020-05-18 | End: 2020-06-18 | Stop reason: SDUPTHER

## 2020-05-18 RX ORDER — ALBUTEROL SULFATE 90 UG/1
2 AEROSOL, METERED RESPIRATORY (INHALATION) EVERY 6 HOURS PRN
Qty: 1 INHALER | Refills: 3 | Status: SHIPPED | OUTPATIENT
Start: 2020-05-18 | End: 2020-07-21 | Stop reason: SDUPTHER

## 2020-05-18 ASSESSMENT — ENCOUNTER SYMPTOMS
BACK PAIN: 0
COLOR CHANGE: 0
VOMITING: 0
EYE PAIN: 0
DIARRHEA: 0
SORE THROAT: 0
COUGH: 0
CHEST TIGHTNESS: 0
NAUSEA: 0
EYE REDNESS: 0
TROUBLE SWALLOWING: 0
ABDOMINAL PAIN: 0
SHORTNESS OF BREATH: 0

## 2020-05-18 NOTE — PROGRESS NOTES
SECTION      times 2    PATELLA SURGERY      plastic      Family History   Problem Relation Age of Onset    Cancer Mother         ovarian and liver    Stroke Mother     Cancer Father         liver    Cancer Paternal Grandmother         kidney    Breast Cancer Sister        Patient's medications, allergies, past medical, surgical, social and family histories were reviewed and updated as appropriate. Current Outpatient Medications on File Prior to Visit   Medication Sig Dispense Refill    fluticasone (FLONASE) 50 MCG/ACT nasal spray 2 sprays by Each Nostril route daily 3 Bottle 3    diclofenac (VOLTAREN) 50 MG EC tablet Take 1 tablet by mouth 2 times daily 60 tablet 3    vilazodone HCl (VIIBRYD) 20 MG TABS Take 1 tablet by mouth daily 90 tablet 0    tiZANidine (ZANAFLEX) 4 MG tablet Take 1 tablet by mouth every 6 hours as needed (muscle spasms) 90 tablet 2     No current facility-administered medications on file prior to visit. Review of Systems   Constitutional: Negative for activity change, appetite change, chills and fever. HENT: Negative for congestion, ear pain, nosebleeds, sore throat and trouble swallowing. Eyes: Negative for pain and redness. Respiratory: Negative for cough, chest tightness and shortness of breath. Cardiovascular: Negative for chest pain, palpitations and leg swelling. Gastrointestinal: Negative for abdominal pain, diarrhea, nausea and vomiting. Genitourinary: Negative for difficulty urinating, dysuria and flank pain. Musculoskeletal: Negative for arthralgias, back pain and gait problem. Skin: Negative for color change, pallor, rash and wound. Allergic/Immunologic: Positive for environmental allergies. Negative for food allergies. Neurological: Negative for dizziness, numbness and headaches. Hematological: Negative for adenopathy. Does not bruise/bleed easily.    Psychiatric/Behavioral: Positive for agitation, decreased concentration and sleep disturbance. The patient is nervous/anxious and is hyperactive. OBJECTIVE:  There were no vitals taken for this visit. UNABLE TO PERFORM THIS SINCE THIS ENCOUNTER IS VIA TELEPHONE DUE TO LIMITING EXPOSURE TO COVID-19 PER [employer] POLICIES AND PROCEDURES. No results found for requested labs within last 30 days. LDL Calculated (mg/dL)   Date Value   01/07/2019 154 (H)         Lab Results   Component Value Date    WBC 11.5 01/07/2019    NEUTROABS 7.6 01/07/2019    HGB 16.6 01/07/2019    HCT 52.4 01/07/2019    MCV 96.0 01/07/2019     01/07/2019       Lab Results   Component Value Date    TSH 0.70 05/08/2014          ASSESSMENT/PLAN:     Sajan Pedro was seen today for 1 month follow-up. Diagnoses and all orders for this visit:    Anxiety  -     clonazePAM (KLONOPIN) 1 MG tablet; Take 1 tablet by mouth 3 times daily as needed for Anxiety (insomnia) for up to 30 days. Depression, unspecified depression type  -     clonazePAM (KLONOPIN) 1 MG tablet; Take 1 tablet by mouth 3 times daily as needed for Anxiety (insomnia) for up to 30 days. Insomnia, unspecified type  -     clonazePAM (KLONOPIN) 1 MG tablet; Take 1 tablet by mouth 3 times daily as needed for Anxiety (insomnia) for up to 30 days. Essential hypertension  -     lisinopril (PRINIVIL;ZESTRIL) 10 MG tablet; TAKE ONE TABLET BY MOUTH DAILY    Non-seasonal allergic rhinitis, unspecified trigger  -     albuterol sulfate  (90 Base) MCG/ACT inhaler; Inhale 2 puffs into the lungs every 6 hours as needed for Wheezing  -     montelukast (SINGULAIR) 10 MG tablet; TAKE ONE TABLET BY MOUTH ONCE NIGHTLY    Menopause  -     progesterone (PROMETRIUM) 100 MG capsule; Take 1 capsule by mouth daily  -     estradiol (CLIMARA) 0.025 MG/24HR; Place 1 patch onto the skin once a week        Controlled Substances Monitoring:     RX Monitoring 5/18/2020   Attestation The Prescription Monitoring Report for this patient was reviewed today. Periodic Controlled Substance Monitoring -        PATIENT COUNSELING     Counseling was provided today regarding the following topics: Healthy eating habits, Regular exercise, substance abuse and healthy sleep habits. Discussed use, benefit, and side effects of prescribed medications. Barriers to medication compliance addressed. All patient questions answered. Patient voiced understanding. Medications Discontinued During This Encounter   Medication Reason    clonazePAM (KLONOPIN) 0.5 MG tablet     albuterol sulfate  (90 Base) MCG/ACT inhaler REORDER    progesterone (PROMETRIUM) 100 MG capsule REORDER    estradiol (CLIMARA) 0.025 MG/24HR REORDER    montelukast (SINGULAIR) 10 MG tablet REORDER    lisinopril (PRINIVIL;ZESTRIL) 10 MG tablet REORDER       Return in about 1 month (around 6/18/2020). SANTINO Nicholson CNP     Education was provided for discussed topics. Call the office with worsening complaints or any side effects to any medications. If an emergency please call 911.

## 2020-06-01 RX ORDER — FLUTICASONE PROPIONATE 50 MCG
SPRAY, SUSPENSION (ML) NASAL
Qty: 1 BOTTLE | Refills: 2 | Status: SHIPPED | OUTPATIENT
Start: 2020-06-01 | End: 2020-07-21 | Stop reason: SDUPTHER

## 2020-06-18 ENCOUNTER — VIRTUAL VISIT (OUTPATIENT)
Dept: FAMILY MEDICINE CLINIC | Age: 55
End: 2020-06-18
Payer: MEDICAID

## 2020-06-18 PROCEDURE — 99213 OFFICE O/P EST LOW 20 MIN: CPT | Performed by: NURSE PRACTITIONER

## 2020-06-18 RX ORDER — CLONAZEPAM 1 MG/1
1 TABLET ORAL 3 TIMES DAILY PRN
Qty: 90 TABLET | Refills: 0 | Status: SHIPPED | OUTPATIENT
Start: 2020-06-18 | End: 2020-07-14 | Stop reason: SDUPTHER

## 2020-06-18 ASSESSMENT — ENCOUNTER SYMPTOMS
CHEST TIGHTNESS: 0
EYE REDNESS: 0
DIARRHEA: 0
TROUBLE SWALLOWING: 0
BACK PAIN: 0
COLOR CHANGE: 0
COUGH: 0
VOMITING: 0
SHORTNESS OF BREATH: 0
SORE THROAT: 0
NAUSEA: 0
EYE PAIN: 0
ABDOMINAL PAIN: 0

## 2020-07-14 RX ORDER — CLONAZEPAM 1 MG/1
1 TABLET ORAL 3 TIMES DAILY PRN
Qty: 90 TABLET | Refills: 0 | Status: SHIPPED | OUTPATIENT
Start: 2020-07-14 | End: 2020-07-21 | Stop reason: SDUPTHER

## 2020-07-14 NOTE — TELEPHONE ENCOUNTER
Patient called, requested refill.      Last Office Visit Date:  6/18/2020    Next Office Visit Date:  Future Appointments   Date Time Provider Jeaneth Inman   7/21/2020  9:30 AM SANTINO Lombardi CNP [facility] [place] [address]ODRIGUE[address] Up to date

## 2020-07-21 ENCOUNTER — VIRTUAL VISIT (OUTPATIENT)
Dept: FAMILY MEDICINE CLINIC | Age: 55
End: 2020-07-21
Payer: MEDICAID

## 2020-07-21 PROCEDURE — 99213 OFFICE O/P EST LOW 20 MIN: CPT | Performed by: NURSE PRACTITIONER

## 2020-07-21 RX ORDER — CLONAZEPAM 1 MG/1
1 TABLET ORAL 3 TIMES DAILY PRN
Qty: 90 TABLET | Refills: 0 | Status: SHIPPED | OUTPATIENT
Start: 2020-07-21 | End: 2020-09-14 | Stop reason: SDUPTHER

## 2020-07-21 RX ORDER — ALBUTEROL SULFATE 90 UG/1
2 AEROSOL, METERED RESPIRATORY (INHALATION) EVERY 6 HOURS PRN
Qty: 1 INHALER | Refills: 3 | Status: SHIPPED | OUTPATIENT
Start: 2020-07-21 | End: 2020-11-19 | Stop reason: SDUPTHER

## 2020-07-21 RX ORDER — FLUTICASONE PROPIONATE 50 MCG
SPRAY, SUSPENSION (ML) NASAL
Qty: 1 BOTTLE | Refills: 2 | Status: SHIPPED | OUTPATIENT
Start: 2020-07-21 | End: 2020-11-19 | Stop reason: SDUPTHER

## 2020-07-21 ASSESSMENT — ENCOUNTER SYMPTOMS
VOMITING: 0
EYE PAIN: 0
COUGH: 0
TROUBLE SWALLOWING: 0
COLOR CHANGE: 0
BACK PAIN: 0
ABDOMINAL PAIN: 0
CHEST TIGHTNESS: 0
SORE THROAT: 0
EYE REDNESS: 0
DIARRHEA: 0
NAUSEA: 0

## 2020-07-21 NOTE — PROGRESS NOTES
SUBJECTIVE:    Patient ID: Lisa Celeste is a 49 y.o. female. Chief Complaint   Patient presents with    Follow-up     Virtual Video encounter with the patient for 1 month follow-up regarding anxiety and depression, Panic attacks, insomnia and seasonal allergies and asthma. Patient reports her overall health status is stable at this time. She is requesting refills on her Klonopin that she takes as needed for anxiety and depression and panic attacks and at nighttime for insomnia. 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 Patient requesting refills on allergy medication for seasonal allergies and albuterol inhaler for asthma. Patient reports she is staying hydrated as she works out in the heat in her gardens. Been taking care of her grandchildren and swimming a lot in the pool. Patient denies any acute complaints at this time. Patient does report a few days ago having some heartburn symptoms but that has subsided this morning. She will follow-up if this continues.   Patient requesting medications be sent to [facility] in [place]     Active Ambulatory Problems     Diagnosis Date Noted    Primary osteoarthritis of both knees 2016    Postmenopausal HRT (hormone replacement therapy) 2016    Chronic pain of both knees 2019    Allergic rhinitis 2019    Anxiety 2019    Breast nodule 2019     Resolved Ambulatory Problems     Diagnosis Date Noted    Acute maxillary sinusitis 2016    Essential hypertension 2016     Past Medical History:   Diagnosis Date    Hypertension       No Known Allergies   Past Surgical History:   Procedure Laterality Date     SECTION      times 2    PATELLA SURGERY      plastic      Family History   Problem Relation Age of Onset    Cancer Mother         ovarian and liver    Stroke Mother    Jaymei Scott Cancer Father         liver    Cancer Paternal Grandmother         kidney    Breast Cancer Sister        Patient's medications, allergies, past medical, surgical, social and family histories were reviewed and updated as appropriate. Current Outpatient Medications on File Prior to Visit   Medication Sig Dispense Refill    progesterone (PROMETRIUM) 100 MG capsule Take 1 capsule by mouth daily 30 capsule 2    estradiol (CLIMARA) 0.025 MG/24HR Place 1 patch onto the skin once a week 4 patch 2    montelukast (SINGULAIR) 10 MG tablet TAKE ONE TABLET BY MOUTH ONCE NIGHTLY 30 tablet 2    lisinopril (PRINIVIL;ZESTRIL) 10 MG tablet TAKE ONE TABLET BY MOUTH DAILY 30 tablet 2    diclofenac (VOLTAREN) 50 MG EC tablet Take 1 tablet by mouth 2 times daily 60 tablet 3    vilazodone HCl (VIIBRYD) 20 MG TABS Take 1 tablet by mouth daily 90 tablet 0    tiZANidine (ZANAFLEX) 4 MG tablet Take 1 tablet by mouth every 6 hours as needed (muscle spasms) 90 tablet 2     No current facility-administered medications on file prior to visit. Review of Systems   Constitutional: Negative for activity change, appetite change, chills and fever. HENT: Negative for congestion, ear pain, nosebleeds, sore throat and trouble swallowing. Eyes: Negative for pain and redness. Respiratory: Negative for cough and chest tightness. Cardiovascular: Negative for leg swelling. Gastrointestinal: Negative for abdominal pain, diarrhea, nausea and vomiting. Genitourinary: Negative for difficulty urinating, dysuria and flank pain. Musculoskeletal: Negative for arthralgias, back pain and gait problem. Skin: Negative for color change, pallor, rash and wound. Allergic/Immunologic: Positive for environmental allergies. Negative for food allergies. Neurological: Negative for dizziness and numbness. Hematological: Negative for adenopathy. Does not bruise/bleed easily.    Psychiatric/Behavioral: Positive for agitation, decreased concentration and sleep disturbance. The patient is nervous/anxious and is hyperactive. OBJECTIVE:  There were no vitals taken for this visit. UNABLE TO PERFORM THIS SINCE THIS ENCOUNTER IS VIA TELEPHONE/Video. DUE TO LIMITING EXPOSURE TO COVID-19 PER [employer] POLICIES AND PROCEDURES. No results found for requested labs within last 30 days. LDL Calculated (mg/dL)   Date Value   01/07/2019 154 (H)         Lab Results   Component Value Date    WBC 11.5 01/07/2019    NEUTROABS 7.6 01/07/2019    HGB 16.6 01/07/2019    HCT 52.4 01/07/2019    MCV 96.0 01/07/2019     01/07/2019       Lab Results   Component Value Date    TSH 0.70 05/08/2014          ASSESSMENT/PLAN:     Delia Bernstein was seen today for follow-up. Diagnoses and all orders for this visit:    Non-seasonal allergic rhinitis, unspecified trigger  -     fluticasone (FLONASE) 50 MCG/ACT nasal spray; SPRAY TWO SPRAYS IN EACH NOSTRIL ONCE DAILY  -     albuterol sulfate  (90 Base) MCG/ACT inhaler; Inhale 2 puffs into the lungs every 6 hours as needed for Wheezing    Anxiety  -     clonazePAM (KLONOPIN) 1 MG tablet; Take 1 tablet by mouth 3 times daily as needed for Anxiety (insomnia) for up to 30 days. Depression, unspecified depression type  -     clonazePAM (KLONOPIN) 1 MG tablet; Take 1 tablet by mouth 3 times daily as needed for Anxiety (insomnia) for up to 30 days. Insomnia, unspecified type  -     clonazePAM (KLONOPIN) 1 MG tablet; Take 1 tablet by mouth 3 times daily as needed for Anxiety (insomnia) for up to 30 days. Video Visit completed. 15 minutes spent with the patient. Controlled Substances Monitoring:     RX Monitoring 7/21/2020   Attestation The Prescription Monitoring Report for this patient was reviewed today.    Periodic Controlled Substance Monitoring -        PATIENT COUNSELING     Counseling was provided today regarding the following topics: Healthy eating habits, Regular exercise, substance abuse

## 2020-08-13 RX ORDER — LISINOPRIL 10 MG/1
TABLET ORAL
Qty: 30 TABLET | Refills: 5 | Status: SHIPPED | OUTPATIENT
Start: 2020-08-13 | End: 2021-02-08

## 2020-08-13 RX ORDER — MONTELUKAST SODIUM 10 MG/1
TABLET ORAL
Qty: 30 TABLET | Refills: 5 | Status: SHIPPED | OUTPATIENT
Start: 2020-08-13 | End: 2021-02-08

## 2020-09-14 RX ORDER — CLONAZEPAM 1 MG/1
1 TABLET ORAL 3 TIMES DAILY PRN
Qty: 90 TABLET | Refills: 0 | Status: SHIPPED | OUTPATIENT
Start: 2020-09-14 | End: 2020-10-13 | Stop reason: SDUPTHER

## 2020-09-14 NOTE — TELEPHONE ENCOUNTER
Patient called, requested refill. Last Office Visit Date:  7/21/2020    Next Office Visit Date:  No future appointments.     MICHAEL Up to date

## 2020-10-13 PROCEDURE — G2012 BRIEF CHECK IN BY MD/QHP: HCPCS | Performed by: NURSE PRACTITIONER

## 2020-10-13 RX ORDER — CLONAZEPAM 1 MG/1
1 TABLET ORAL 3 TIMES DAILY PRN
Qty: 90 TABLET | Refills: 0 | Status: SHIPPED | OUTPATIENT
Start: 2020-10-13 | End: 2020-11-17 | Stop reason: SDUPTHER

## 2020-10-13 NOTE — TELEPHONE ENCOUNTER
Patient called, requested refill. Next Office Visit Date:  No future appointments. Guero Shi for review    [id number]: Brief communication technology-based service, e.g. virtual check-in, by a physician or other qualified health care professional who can report evaluation and management services, provided to an established patient, not originating from a related e/m service provided within the previous 7 days nor leading to an e/m service or procedure within the next 24 hours or soonest available appointment; 5-10 minutes of medical discussion. Jignesh Milian was seen today for medication refill. Diagnoses and all orders for this visit:    Anxiety  -     clonazePAM (KLONOPIN) 1 MG tablet; Take 1 tablet by mouth 3 times daily as needed for Anxiety (insomnia) for up to 30 days. Depression, unspecified depression type  -     clonazePAM (KLONOPIN) 1 MG tablet; Take 1 tablet by mouth 3 times daily as needed for Anxiety (insomnia) for up to 30 days. Insomnia, unspecified type  -     clonazePAM (KLONOPIN) 1 MG tablet; Take 1 tablet by mouth 3 times daily as needed for Anxiety (insomnia) for up to 30 days.

## 2020-11-06 NOTE — TELEPHONE ENCOUNTER
----- Message from Henrique Weems MD sent at 8/30/2018  8:30 AM EDT -----  Climara patch one q week, #one month, refills 3   [facility]  [place] Medical Record Number:  [id number]  Place of Service where encounter took place:  [facility] [[id number]]  Chief Complaint   Patient presents with     Nursing Home Acute       HPI:    Reanna Garza  is a 89 year old (12/25/1926), who is being seen today for an episodic care visit.  HPI information obtained from: facility chart records, facility staff and patient report. Today's concern is:    Patient Reanna Garza is 89 yr old female admitted to [facility] for rehabilitation s/p hospitalization ER visit 10/22/20 for generalized weakness with falls. Has urinary incontinence. Pending UC results  Recent hospitalization for fall 10/1-10/4/20  Lives with daughter. Walks with walker    PMHx frequent fall, dementia, hypertension, hyperlipidemia, hx CVA with left sided weakness, non-insulin-dependent type 2 diabetes, DJD, abdominal aneurysm without rupture, RLS, macular degeneration and lumbar spondylolysis       Infection due to 2019 novel coronavirus  Vascular dementia without behavioral disturbance (H)  Physical deconditioning  Type 2 diabetes mellitus without complication, without long-term current use of insulin (H)     Patient recently diagnosed with COVID-19, patient has no acute clinical signs and symptoms other then poor oral intake, however, had this prior  Has decrease oral intake, blood pressure trend low, weights trend down and poor progress in therapies  Family elect to have hospice referral; spoke to daughter Belle today (primary contact). 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    MEDICATIONS:    Current Outpatient Medications   Medication Sig Dispense Refill     acetaminophen (TYLENOL) 325 MG tablet Take 650 mg by mouth every 4 hours as needed (hip pain)       aspirin 81 MG EC tablet Take 81 mg by mouth 2 times daily       donepezil (ARICEPT) 10 MG tablet  "Take 10 mg by mouth every morning       melatonin 3 MG tablet Take 6 mg by mouth At Bedtime           REVIEW OF SYSTEMS:  4 point ROS including Respiratory, CV, GI and , other than that noted in the HPI,  is negative    Objective:  /56   Pulse 57   Temp 98.9  F (37.2  C)   Resp 16   Ht 1.626 m (5' 4\")   Wt 68.2 kg (150 lb 6.4 oz)   SpO2 94%   BMI 25.82 kg/m    Exam:  GENERAL APPEARANCE:  Alert, in no distress  ENT:  Mouth and posterior oropharynx normal, moist mucous membranes  EYES:  EOM, conjunctivae, lids, pupils and irises normal  NECK:  No adenopathy,masses or thyromegaly  RESP:  no respiratory distress  M/S:   lying in bed  SKIN:  Inspection of skin and subcutaneous tissue baseline  NEURO:   Cranial nerves 2-12 are normal tested and grossly at patient's baseline  PSYCH:  memory impaired     Labs:   Labs done in SNF are in [facility] EPIC. Please refer to them using EPIC/Care Everywhere.    ASSESSMENT/PLAN:     Infection due to 2019 novel coronavirus  Vascular dementia without behavioral disturbance (H)  Physical deconditioning  Type 2 diabetes mellitus without complication, without long-term current use of insulin (H)     Progressive physical and mental decline from dementia, progressive weight loss due to not eating and newly diagnosed with COVID19 with expected worsening patient status  Family elect hospice referral ; [facility] hospice referral made  Daughter Belle plan to take patient home when able given patient recent COVID19 infection.  Patient appear comfortable at this time, monitor   Other: staff to continue to help feed patient; family dropping off food from home they think she might eat    Med review  Order discontinue glipizide due to poor oral intake  Discontinue calcium and vitamin D, vitamin D, Lovastatin, multivitamins due to likely not benefit  Discontinue Losartan due to low blood pressure   Discontinue blood sugar checks    Electronically signed by:  MARGARITA Boucher CNP "

## 2020-11-17 RX ORDER — CLONAZEPAM 1 MG/1
1 TABLET ORAL 3 TIMES DAILY PRN
Qty: 90 TABLET | Refills: 0 | Status: SHIPPED | OUTPATIENT
Start: 2020-11-17 | End: 2020-12-30 | Stop reason: SDUPTHER

## 2020-11-17 NOTE — TELEPHONE ENCOUNTER
Patient called, requested refill.        Next Office Visit Date:  Future Appointments   Date Time Provider Jeaneth Inman   11/19/2020 10:15 AM SANTINO Coronado - CNP [address] up to date

## 2020-11-18 NOTE — PROGRESS NOTES
Chief Complaint   Patient presents with    Anxiety     f/u       Have you seen any other physician or provider since your last visit no    Have you had any other diagnostic tests since your last visit? no    Have you changed or stopped any medications since your last visit? no     I have recommended that this patient have a flu shot and shingles but she declines at this time. I have discussed the risks and benefits of this examination with her. The patient verbalizes understanding. Diabetic retinal exam completed this year?  N/A                       * If yes please have patient sign a records release to obtain record to update Health Maintenance                       * If no, please order referral for patient to be scheduled         Health Maintenance Due This Visit   Colonoscopy Yes   Mammogram No   Annual Wellness Visit No   Microalbumin No   HgbA1C No   Diabetic Eye Exam No    House Bill One Due This Visit   MICHAEL Yes   UDS Yes   Contract No

## 2020-11-19 ENCOUNTER — VIRTUAL VISIT (OUTPATIENT)
Dept: FAMILY MEDICINE CLINIC | Age: 55
End: 2020-11-19
Payer: MEDICAID

## 2020-11-19 PROCEDURE — 99442 PR PHYS/QHP TELEPHONE EVALUATION 11-20 MIN: CPT | Performed by: NURSE PRACTITIONER

## 2020-11-19 RX ORDER — ALBUTEROL SULFATE 90 UG/1
2 AEROSOL, METERED RESPIRATORY (INHALATION) EVERY 6 HOURS PRN
Qty: 1 INHALER | Refills: 3 | Status: SHIPPED | OUTPATIENT
Start: 2020-11-19 | End: 2021-11-29 | Stop reason: SDUPTHER

## 2020-11-19 RX ORDER — FLUTICASONE PROPIONATE 50 MCG
SPRAY, SUSPENSION (ML) NASAL
Qty: 1 BOTTLE | Refills: 2 | Status: SHIPPED | OUTPATIENT
Start: 2020-11-19 | End: 2021-04-06

## 2020-11-19 ASSESSMENT — ENCOUNTER SYMPTOMS
ABDOMINAL PAIN: 0
VOMITING: 0
COUGH: 0
SORE THROAT: 0
BACK PAIN: 0
TROUBLE SWALLOWING: 0
NAUSEA: 0
DIARRHEA: 0
CHEST TIGHTNESS: 0
COLOR CHANGE: 0
EYE REDNESS: 0
EYE PAIN: 0

## 2020-11-19 NOTE — PROGRESS NOTES
SUBJECTIVE:    Patient ID: Davey Kaminski is a 48 y.o. female. Chief Complaint   Patient presents with    Anxiety     f/u     Telephone encounter with the patient for 1 month follow-up regarding anxiety and depression, Panic attacks, insomnia and seasonal allergies and asthma. Patient reports her overall health status is stable at this time. She continues to take all medications as prescribed without any side effects. She does report worsening allergies. But no fever, cough or SOA. 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 She is now legally  and much happier. Active Ambulatory Problems     Diagnosis Date Noted    Primary osteoarthritis of both knees 2016    Postmenopausal HRT (hormone replacement therapy) 2016    Chronic pain of both knees 2019    Allergic rhinitis 2019    Anxiety 2019    Breast nodule 2019     Resolved Ambulatory Problems     Diagnosis Date Noted    Acute maxillary sinusitis 2016    Essential hypertension 2016     Past Medical History:   Diagnosis Date    Hypertension       No Known Allergies   Past Surgical History:   Procedure Laterality Date     SECTION      times 2    PATELLA SURGERY      plastic      Family History   Problem Relation Age of Onset    Cancer Mother         ovarian and liver    Stroke Mother     Cancer Father         liver    Cancer Paternal Grandmother         kidney    Breast Cancer Sister        Patient's medications, allergies, past medical, surgical, social and family histories were reviewed and updated as appropriate. Current Outpatient Medications on File Prior to Visit   Medication Sig Dispense Refill    clonazePAM (KLONOPIN) 1 MG tablet Take 1 tablet by mouth 3 times daily as needed for Anxiety (insomnia) for up to 30 days.  90 tablet 0    estradiol (CLIMARA) 0.025 MG/24HR APPLY ONE PATCH TO THE SKIN ONCE WEEKLY 4 patch 2    progesterone (PROMETRIUM) 100 MG capsule TAKE ONE CAPSULE BY MOUTH DAILY 30 capsule 3    lisinopril (PRINIVIL;ZESTRIL) 10 MG tablet TAKE ONE TABLET BY MOUTH DAILY 30 tablet 5    montelukast (SINGULAIR) 10 MG tablet TAKE ONE TABLET BY MOUTH ONCE NIGHTLY 30 tablet 5    diclofenac (VOLTAREN) 50 MG EC tablet Take 1 tablet by mouth 2 times daily 60 tablet 3    tiZANidine (ZANAFLEX) 4 MG tablet Take 1 tablet by mouth every 6 hours as needed (muscle spasms) 90 tablet 2    vilazodone HCl (VIIBRYD) 20 MG TABS Take 1 tablet by mouth daily (Patient not taking: Reported on 11/19/2020) 90 tablet 0     No current facility-administered medications on file prior to visit. Review of Systems   Constitutional: Negative for activity change, appetite change, chills and fever. HENT: Negative for congestion, ear pain, nosebleeds, sore throat and trouble swallowing. Eyes: Negative for pain and redness. Respiratory: Negative for cough and chest tightness. Cardiovascular: Negative for leg swelling. Gastrointestinal: Negative for abdominal pain, diarrhea, nausea and vomiting. Genitourinary: Negative for difficulty urinating, dysuria and flank pain. Musculoskeletal: Negative for arthralgias, back pain and gait problem. Skin: Negative for color change, pallor, rash and wound. Allergic/Immunologic: Positive for environmental allergies. Negative for food allergies. Neurological: Negative for dizziness and numbness. Hematological: Negative for adenopathy. Does not bruise/bleed easily. Psychiatric/Behavioral: Negative for agitation and sleep disturbance. The patient is not nervous/anxious and is not hyperactive. Improved with medication and divorce      OBJECTIVE:  There were no vitals taken for this visit. Due to the current efforts to prevent transmission of COVID-19 and also the need to preserve PPE for other caregivers, a face-to-face encounter with the patient was not performed.  That being said, all relevant records and diagnostic tests were reviewed, including laboratory results and imaging. Please reference any relevant documentation elsewhere. Care will be coordinated with the primary service. No results found for requested labs within last 30 days. LDL Calculated (mg/dL)   Date Value   01/07/2019 154 (H)         Lab Results   Component Value Date    WBC 11.5 01/07/2019    NEUTROABS 7.6 01/07/2019    HGB 16.6 01/07/2019    HCT 52.4 01/07/2019    MCV 96.0 01/07/2019     01/07/2019       Lab Results   Component Value Date    TSH 0.70 05/08/2014          ASSESSMENT/PLAN:     Peri Leyva was seen today for anxiety. Diagnoses and all orders for this visit:    Anxiety        - Stable    Non-seasonal allergic rhinitis, unspecified trigger  -     albuterol sulfate  (90 Base) MCG/ACT inhaler; Inhale 2 puffs into the lungs every 6 hours as needed for Wheezing  -     fluticasone (FLONASE) 50 MCG/ACT nasal spray; SPRAY TWO SPRAYS IN EACH NOSTRIL ONCE DAILY    Depression, unspecified depression type        - Stable     Telephone completed. 15 minutes spent with the patient. Controlled Substances Monitoring:     RX Monitoring 11/19/2020   Attestation The Prescription Monitoring Report for this patient was reviewed today. Periodic Controlled Substance Monitoring -        PATIENT COUNSELING     Counseling was provided today regarding the following topics: Healthy eating habits, Regular exercise, substance abuse and healthy sleep habits. Discussed use, benefit, and side effects of prescribed medications. Barriers to medication compliance addressed. All patient questions answered. Patient voiced understanding. Medications Discontinued During This Encounter   Medication Reason    albuterol sulfate  (90 Base) MCG/ACT inhaler REORDER    fluticasone (FLONASE) 50 MCG/ACT nasal spray REORDER       Return in about 3 months (around 2/19/2021) for VV. SANTINO Solo CNP     Education was provided for discussed topics. Call the office with worsening complaints or any side effects to any medications. If an emergency please call 911.

## 2020-12-07 NOTE — TELEPHONE ENCOUNTER
Refill request received from pharmacy. Medication pending for approval.       Last Office Visit With PCP:  11/19/2020    Next Visit Date:  No future appointments.     MICHAEL BERNAL

## 2020-12-30 ENCOUNTER — TELEPHONE (OUTPATIENT)
Dept: FAMILY MEDICINE CLINIC | Age: 55
End: 2020-12-30

## 2020-12-30 RX ORDER — CLONAZEPAM 1 MG/1
1 TABLET ORAL 3 TIMES DAILY PRN
Qty: 90 TABLET | Refills: 0 | Status: SHIPPED | OUTPATIENT
Start: 2020-12-30 | End: 2021-02-19 | Stop reason: SDUPTHER

## 2020-12-30 RX ORDER — ALBUTEROL SULFATE 90 UG/1
2 AEROSOL, METERED RESPIRATORY (INHALATION) EVERY 6 HOURS PRN
Qty: 1 INHALER | Refills: 2 | Status: SHIPPED | OUTPATIENT
Start: 2020-12-30 | End: 2021-09-13 | Stop reason: SDUPTHER

## 2020-12-30 NOTE — TELEPHONE ENCOUNTER
Insurance will not cover Albuterol after January 1st, 2021. They will cover the ProAir HFA or Ventolin HFA inhaler. Will you change this to either one of these inhalers? Jenise Alexandra.

## 2020-12-30 NOTE — TELEPHONE ENCOUNTER
Patient called, requested refill. Next Office Visit Date:  No future appointments.     MICHAEL up to date

## 2021-01-10 DIAGNOSIS — Z78.0 MENOPAUSE: ICD-10-CM

## 2021-02-06 DIAGNOSIS — I10 ESSENTIAL HYPERTENSION: ICD-10-CM

## 2021-02-06 DIAGNOSIS — J30.89 NON-SEASONAL ALLERGIC RHINITIS, UNSPECIFIED TRIGGER: ICD-10-CM

## 2021-02-08 RX ORDER — LISINOPRIL 10 MG/1
TABLET ORAL
Qty: 30 TABLET | Refills: 5 | Status: SHIPPED | OUTPATIENT
Start: 2021-02-08 | End: 2021-09-13 | Stop reason: SDUPTHER

## 2021-02-08 RX ORDER — MONTELUKAST SODIUM 10 MG/1
TABLET ORAL
Qty: 30 TABLET | Refills: 5 | Status: SHIPPED | OUTPATIENT
Start: 2021-02-08 | End: 2021-09-10 | Stop reason: SDUPTHER

## 2021-02-08 NOTE — TELEPHONE ENCOUNTER
Refill request received from pharmacy. Medication pending for approval.       Last Office Visit With PCP:  11/19/2020    Next Visit Date:  No future appointments.     MICHAEL moon

## 2021-02-19 DIAGNOSIS — F41.9 ANXIETY: ICD-10-CM

## 2021-02-19 DIAGNOSIS — F32.A DEPRESSION, UNSPECIFIED DEPRESSION TYPE: ICD-10-CM

## 2021-02-19 DIAGNOSIS — G47.00 INSOMNIA, UNSPECIFIED TYPE: ICD-10-CM

## 2021-02-19 RX ORDER — CLONAZEPAM 1 MG/1
1 TABLET ORAL 3 TIMES DAILY PRN
Qty: 90 TABLET | Refills: 0 | Status: SHIPPED | OUTPATIENT
Start: 2021-02-19 | End: 2021-04-08 | Stop reason: SDUPTHER

## 2021-02-19 NOTE — TELEPHONE ENCOUNTER
Requested Prescriptions     Pending Prescriptions Disp Refills    clonazePAM (KLONOPIN) 1 MG tablet 90 tablet 0     Sig: Take 1 tablet by mouth 3 times daily as needed for Anxiety (insomnia) for up to 30 days.

## 2021-03-10 NOTE — PATIENT INSTRUCTIONS
Discharge Summary    CHIEF COMPLAINT ON ADMISSION  Chief Complaint   Patient presents with   • Numbness     L side ear and head, pt states it started last night. Denies any stroke-like symptoms or fever       Reason for Admission  Ear Numbness     Admission Date  3/8/2021    CODE STATUS  Full Code    HPI & HOSPITAL COURSE  This is a 85 y.o. female here with left-sided facial numbness.  The patient dose was initially evaluate the CT of the head which did not show any abnormalities.  The patient then was admitted to the medical floor under telemetric monitoring the patient underwent serial cardiac marker evaluation as well as echocardiogram, MRI of the head which shows an acute CVA in the right centrum semiovale and right posterior frontal gray matter and tiny areas.  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  The patient has been placed on aspirin as well as a statin.  Neurology consultation was requested, Dr. López was able to consult on the patient.  He recommends continuation of aspirin statin and then follow-ups with the stroke clinic as an outpatient.  Patient was eval by physical therapy occupational therapy have cleared her.  Patient is can discharge home safely and follow-up as an outpatient with the modalities.  Currently patient has no neurological deficits and is back to her baseline.    Therefore, she is discharged in good and stable condition to home with close outpatient follow-up.    The patient met 2-midnight criteria for an inpatient stay at the time of discharge.    Discharge Date  3/10/2021    FOLLOW UP ITEMS POST DISCHARGE  Follow-up with the primary care physician in 7 to 10 days  Follow-up with the neurology as scheduled    DISCHARGE DIAGNOSES  Principal Problem:    Left facial numbness POA: Unknown  Active Problems:    Essential hypertension POA: Unknown    Bradycardia POA: Unknown    Vitamin D deficiency POA: Yes    Dyslipidemia POA: Yes    Subclinical hyperthyroidism POA:  The medication list included in this document is our record of what you are currently taking, including any changes that were made at today's visit.  If you find any differences when compared to your medications at home, or have any questions that were not answered at your visit, please contact the office. · Keep a list of your medicines with you. List all of the prescription medicines, nonprescription medicines, supplements, natural remedies, and vitamins that you take. Tell your healthcare providers who treat you about all of the products you are taking. Your provider can provide you with a form to keep track of them. Just ask. · Follow the directions that come with your medicine, including information about food or alcohol. Make sure you know how and when to take your medicine. Do not take more or less than you are supposed to take. · Keep all medicines out of the reach of children. · Store medicines according to the directions on the label. · Monitor yourself. Learn to know how your body reacts to your new medicine and keep track of how it makes you feel before attempting (If your provider has allowed you to do so) to drive or go to work. · Seek emergency medical attention if you think you have used too much of this medicine. An overdose of any prescription medicine can be fatal. Overdose symptoms may include extreme drowsiness, muscle weakness, confusion, cold and clammy skin, pinpoint pupils, shallow breathing, slow heart rate, fainting, or coma. · Don't share prescription medicines with others, even when they seem to have the same symptoms. What may be good for you may be harmful to others. · If you are no longer taking a prescribed medication and you have pills left please take your pills out of their original containers. Mix crushed pills with an undesirable substance, such as cat litter or used coffee grounds.  Put the mixture into a disposable container with a lid, such as an empty margarine tub, "Unknown  Resolved Problems:    * No resolved hospital problems. *      FOLLOW UP  Future Appointments   Date Time Provider Department Center   5/10/2021 11:30 AM CHI[facility] Benjamin     No follow-up provider specified.    MEDICATIONS ON DISCHARGE     Medication List      START taking these medications      Instructions   aspirin 325 MG Tabs  Start taking on: March 11, 2021  Commonly known as: ASA   Take 1 tablet by mouth every day.  Dose: 325 mg     atorvastatin 80 MG tablet  Commonly known as: LIPITOR   Take 1 tablet by mouth every evening.  Dose: 80 mg        CHANGE how you take these medications      Instructions   amLODIPine 5 MG Tabs  Start taking on: March 11, 2021  What changed: when to take this  Commonly known as: NORVASC   Take 1 tablet by mouth every day.  Dose: 5 mg     lisinopril 40 MG tablet  Start taking on: March 11, 2021  What changed: when to take this  Commonly known as: PRINIVIL   Take 1 tablet by mouth every day.  Dose: 40 mg        CONTINUE taking these medications      Instructions   ALLERGY MEDICINE PO   Take 1 tablet by mouth as needed (For allergies).  Dose: 1 tablet     IRON PO   Take 1 Tab by mouth every day.  Dose: 1 tablet     oxybutynin SR 10 MG CR tablet  Commonly known as: DITROPAN-XL   Take 1 Tab by mouth every day.  Dose: 10 mg     VITAMIN D PO   Take 1 capsule by mouth every day.  Dose: 1 capsule            Allergies  Allergies   Allergen Reactions   • Pcn [Penicillins] Rash     \"tongue got thick\"       DIET  Orders Placed This Encounter   Procedures   • Diet Order Diet: Cardiac     Standing Status:   Standing     Number of Occurrences:   1     Order Specific Question:   Diet:     Answer:   Cardiac [6]       ACTIVITY  As tolerated.  Weight bearing as tolerated    CONSULTATIONS  Neurology Dr. López    PROCEDURES  None    LABORATORY  Lab Results   Component Value Date    SODIUM 141 03/09/2021    POTASSIUM 5.0 03/09/2021    CHLORIDE 109 03/09/2021    CO2 19 (L) " or into a sealable bag. Cover up or remove any of your personal information on the empty containers by covering it with black permanent marker or duct tape. Place the sealed container with the mixture, and the empty drug containers, in the trash. · If you use a medication that is in the form of a patch, dispose of used patches by folding them in half so that the sticky sides meet, and then flushing them down a toilet. They should not be placed in the household trash where children or pets can find them. · If you have any questions, ask your provider or pharmacist for more information. · Be sure to keep all appointments for provider visits or tests. We are committed to providing you with the best care possible. In order to help us achieve these goals please remember to bring all medications, herbal products, and over the counter supplements with you to each visit. If your provider has ordered testing for you, please be sure to follow up with our office if you have not received results within 7 days after the testing took place. *If you receive a survey after visiting one of our offices, please take time to share your experience concerning your physician office visit. These surveys are confidential and no health information about you is shared. We are eager to improve for you and we are counting on your feedback to help make that happen. 03/09/2021    GLUCOSE 80 03/09/2021    BUN 23 (H) 03/09/2021    CREATININE 0.84 03/09/2021    CREATININE 0.88 04/06/2010    GLOMRATE >59 04/06/2010        Lab Results   Component Value Date    WBC 6.3 03/09/2021    HEMOGLOBIN 14.5 03/09/2021    HEMATOCRIT 43.9 03/09/2021    PLATELETCT 175 03/09/2021        Total time of the discharge process exceeds 39 minutes.

## 2021-04-06 DIAGNOSIS — J30.89 NON-SEASONAL ALLERGIC RHINITIS, UNSPECIFIED TRIGGER: ICD-10-CM

## 2021-04-06 RX ORDER — FLUTICASONE PROPIONATE 50 MCG
SPRAY, SUSPENSION (ML) NASAL
Qty: 1 BOTTLE | Refills: 0 | Status: SHIPPED | OUTPATIENT
Start: 2021-04-06 | End: 2021-04-08 | Stop reason: SDUPTHER

## 2021-04-06 NOTE — TELEPHONE ENCOUNTER
Refill request received from pharmacy.  Medication pending for approval.       Last Office Visit With PCP:  11/19/2020    Next Visit Date:  Future Appointments   Date Time Provider Jeaneth Inman   4/8/2021  1:45 PM SANTINO Hernadez - [facility] [address] [facility]

## 2021-04-08 ENCOUNTER — OFFICE VISIT (OUTPATIENT)
Dept: FAMILY MEDICINE CLINIC | Age: 56
End: 2021-04-08
Payer: MEDICAID

## 2021-04-08 ENCOUNTER — HOSPITAL ENCOUNTER (OUTPATIENT)
Facility: HOSPITAL | Age: 56
Discharge: HOME | End: 2021-04-08
Payer: MEDICAID

## 2021-04-08 VITALS
OXYGEN SATURATION: 99 % | BODY MASS INDEX: 27.14 KG/M2 | RESPIRATION RATE: 16 BRPM | HEART RATE: 88 BPM | HEIGHT: 64 IN | SYSTOLIC BLOOD PRESSURE: 116 MMHG | DIASTOLIC BLOOD PRESSURE: 72 MMHG | WEIGHT: 159 LBS | TEMPERATURE: 97.7 F

## 2021-04-08 DIAGNOSIS — Z78.0 MENOPAUSE: ICD-10-CM

## 2021-04-08 DIAGNOSIS — I10 ESSENTIAL HYPERTENSION: ICD-10-CM

## 2021-04-08 DIAGNOSIS — E55.9 VITAMIN D DEFICIENCY: ICD-10-CM

## 2021-04-08 DIAGNOSIS — J30.89 NON-SEASONAL ALLERGIC RHINITIS, UNSPECIFIED TRIGGER: ICD-10-CM

## 2021-04-08 DIAGNOSIS — K21.9 GASTROESOPHAGEAL REFLUX DISEASE WITHOUT ESOPHAGITIS: ICD-10-CM

## 2021-04-08 DIAGNOSIS — M17.0 PRIMARY OSTEOARTHRITIS OF BOTH KNEES: ICD-10-CM

## 2021-04-08 DIAGNOSIS — F32.A DEPRESSION, UNSPECIFIED DEPRESSION TYPE: ICD-10-CM

## 2021-04-08 DIAGNOSIS — R53.83 OTHER FATIGUE: ICD-10-CM

## 2021-04-08 DIAGNOSIS — F41.9 ANXIETY: Primary | ICD-10-CM

## 2021-04-08 DIAGNOSIS — G47.00 INSOMNIA, UNSPECIFIED TYPE: ICD-10-CM

## 2021-04-08 PROCEDURE — 80061 LIPID PANEL: CPT

## 2021-04-08 PROCEDURE — 82746 ASSAY OF FOLIC ACID SERUM: CPT

## 2021-04-08 PROCEDURE — 82607 VITAMIN B-12: CPT

## 2021-04-08 PROCEDURE — 99214 OFFICE O/P EST MOD 30 MIN: CPT | Performed by: NURSE PRACTITIONER

## 2021-04-08 PROCEDURE — 83036 HEMOGLOBIN GLYCOSYLATED A1C: CPT

## 2021-04-08 PROCEDURE — 85025 COMPLETE CBC W/AUTO DIFF WBC: CPT

## 2021-04-08 PROCEDURE — 80053 COMPREHEN METABOLIC PANEL: CPT

## 2021-04-08 PROCEDURE — 84443 ASSAY THYROID STIM HORMONE: CPT

## 2021-04-08 PROCEDURE — G0444 DEPRESSION SCREEN ANNUAL: HCPCS | Performed by: NURSE PRACTITIONER

## 2021-04-08 PROCEDURE — 82306 VITAMIN D 25 HYDROXY: CPT

## 2021-04-08 RX ORDER — FLUTICASONE PROPIONATE 50 MCG
SPRAY, SUSPENSION (ML) NASAL
Qty: 1 BOTTLE | Refills: 0 | Status: SHIPPED | OUTPATIENT
Start: 2021-04-08 | End: 2021-05-17 | Stop reason: SDUPTHER

## 2021-04-08 RX ORDER — CLONAZEPAM 1 MG/1
1 TABLET ORAL 3 TIMES DAILY PRN
Qty: 90 TABLET | Refills: 1 | Status: SHIPPED | OUTPATIENT
Start: 2021-04-08 | End: 2021-06-22 | Stop reason: SDUPTHER

## 2021-04-08 RX ORDER — OMEPRAZOLE 40 MG/1
40 CAPSULE, DELAYED RELEASE ORAL
Qty: 90 CAPSULE | Refills: 1 | Status: SHIPPED | OUTPATIENT
Start: 2021-04-08 | End: 2021-10-05 | Stop reason: SDUPTHER

## 2021-04-08 RX ORDER — CYCLOBENZAPRINE HCL 5 MG
5 TABLET ORAL 3 TIMES DAILY PRN
Qty: 90 TABLET | Refills: 0 | Status: SHIPPED | OUTPATIENT
Start: 2021-04-08 | End: 2021-05-08

## 2021-04-08 SDOH — ECONOMIC STABILITY: TRANSPORTATION INSECURITY
IN THE PAST 12 MONTHS, HAS THE LACK OF TRANSPORTATION KEPT YOU FROM MEDICAL APPOINTMENTS OR FROM GETTING MEDICATIONS?: NO

## 2021-04-08 ASSESSMENT — ENCOUNTER SYMPTOMS
SHORTNESS OF BREATH: 0
SORE THROAT: 0
VOMITING: 0
CHEST TIGHTNESS: 0
DIARRHEA: 0
ABDOMINAL PAIN: 0
COUGH: 0
EYE REDNESS: 0
BACK PAIN: 0
EYE PAIN: 0
COLOR CHANGE: 0
NAUSEA: 0
TROUBLE SWALLOWING: 0

## 2021-04-08 NOTE — PATIENT INSTRUCTIONS
dispose of used patches by folding them in half so that the sticky sides meet, and then flushing them down a toilet. They should not be placed in the household trash where children or pets can find them. · If you have any questions, ask your provider or pharmacist for more information. · Be sure to keep all appointments for provider visits or tests. We are committed to providing you with the best care possible. In order to help us achieve these goals please remember to bring all medications, herbal products, and over the counter supplements with you to each visit. If your provider has ordered testing for you, please be sure to follow up with our office if you have not received results within 7 days after the testing took place. *If you receive a survey after visiting one of our offices, please take time to share your experience concerning your physician office visit. These surveys are confidential and no health information about you is shared. We are eager to improve for you and we are counting on your feedback to help make that happen. ips to Help You Stop Smoking       Cigarette smoking is a preventable cause of death in the [place]. If you have thought about quitting but haven't been able to, here are some reasons why you should and some ways to do it. Here's Why   Quitting smoking now can decrease your risk of getting smoking-related illnesses like:   Heart disease   Stroke   Several types of cancer, including:   Lung   Mouth   Esophagus   Larynx   Bladder   Pancreas   Kidney   Chronic lung diseases:   Bronchitis   Emphysema   Asthma   Cataracts   Macular degeneration   Thyroid conditions   Hearing loss   Erectile dysfunction   Dementia   Osteoporosis   Here's How   Once you've decided to quit smoking, set your target quit date a few weeks away.  In the time leading up to your quit day, try some of these ideas offered by the [address] [place] [address] [place] to help you successfully quit smoking. For the best results, work with your doctor. Together, you can test your lung function and compare the results to those of a nonsmoking person. The results can be given to you as your lung age. Finding out your lung age right after having the test done may help you to stop smoking. Your doctor can also discuss with you all of your options and refer you to smoking-cessation support groups. You may wish to use nicotine replacement (gum, patches, inhaler) or one of the prescription medications that have been shown to increase quit rates and prolong abstinence from smoking. But whatever you and your doctor decide on these matters, it will still be you who decides when an how to quit. Here are some techniques:   Switch Brands   Switch to a brand you find distasteful. Change to a brand that is low in tar and nicotine a couple of weeks before your target quit date. This will help change your smoking behavior. However, do not smoke more cigarettes, inhale them more often or more deeply, or place your fingertips over the holes in the filters. All of these actions will increase your nicotine intake, and the idea is to get your body used to functioning without nicotine. Cut Down the Number of Cigarettes You Smoke   Smoke only half of each cigarette. Each day, postpone the lighting of your first cigarette by one hour. Decide you'll only smoke during odd or even hours of the day. Decide beforehand how many cigarettes you'll smoke during the day. For each additional cigarette, give a dollar to your favorite frederic. Change your eating habits to help you cut down. For example, drink milk, which many people consider incompatible with smoking. End meals or snacks with something that won't lead to a cigarette. Reach for a glass of juice instead of a cigarette for a \"pick-me-up. \"   Remember: Cutting down can help you quit, but it's not a substitute for quitting.  If you're down to about seven cigarettes a day, it's time to set your target quit date, and get ready to stick to it. Don't Smoke \"Automatically\"   Smoke only those cigarettes you really want. Catch yourself before you light up a cigarette out of pure habit. Don't empty your ashtrays. This will remind you of how many cigarettes you've smoked each day, and the sight and the smell of stale cigarettes butts will be very unpleasant. Make yourself aware of each cigarette by using the opposite hand or putting cigarettes in an unfamiliar location or a different pocket to break the automatic reach. If you light up many times during the day without even thinking about it, try to look in a mirror each time you put a match to your cigarette. You may decide you don't need it. Make Smoking Inconvenient   Stop buying cigarettes by the carton. Wait until one pack is empty before you buy another. Stop carrying cigarettes with you at home or at work. Make them difficult to get to. Make Smoking Unpleasant   Smoke only under circumstances that aren't especially pleasurable for you. If you like to smoke with others, smoke alone. Turn your chair to an empty corner and focus only on the cigarette you are smoking and all its many negative effects. Collect all your cigarette butts in one large glass container as a visual reminder of the filth made by smoking. Just Before Quitting   Practice going without cigarettes. Don't think of never smoking again. Think of quitting in terms of one day at a time . Tell yourself you won't smoke today, and then don't. Clean your clothes to rid them of the cigarette smell, which can linger a long time. On the Day You Quit   Throw away all your cigarettes and matches. Hide your lighters and ashtrays. Visit the dentist and have your teeth cleaned to get rid of tobacco stains. Notice how nice they look and resolve to keep them that way.    Make a list of things you'd like to buy for yourself or someone else. Estimate the cost in terms of packs of cigarettes, and put the money aside to buy these presents. Keep very busy on the big day. Go to the movies, exercise, take long walks, or go bike riding. Remind your family and friends that this is your quit date, and ask them to help you over the rough spots of the first couple of days and weeks. Buy yourself a treat or do something special to celebrate. Telephone and Internet Support   Telephone, web-, and computer-based programs can offer you the support that you need to quit and to stay smoke-free. You can find many programs online, like the [employer]'s [place] from Smoking . Immediately After Quitting   Develop a clean, fresh, nonsmoking environment around yourselfat work and at home. Buy yourself flowersyou may be surprised how much you can enjoy their scent now. The first few days after you quit, spend as much free time as possible in places where smoking isn't allowed, such as [address] [place] [address], museums, theaters, department stores, and churches. Drink large quantities of water and fruit juice (but avoid sodas that contain caffeine). Try to avoid alcohol, coffee, and other beverages that you associate with cigarette smoking. Strike up conversation instead of a match for a cigarette. If you miss the sensation of having a cigarette in your hand, play with something elsea pencil, a paper clip, a marble. If you miss having something in your mouth, try toothpicks or a fake cigarette.

## 2021-04-08 NOTE — PROGRESS NOTES
Chief Complaint   Patient presents with    Anxiety     refills    Neck Pain       Have you seen any other physician or provider since your last visit no    Have you had any other diagnostic tests since your last visit? no    Have you changed or stopped any medications since your last visit? no

## 2021-04-08 NOTE — PROGRESS NOTES
SUBJECTIVE:    Patient ID: Iliana Camara is a 48 y.o. female. Chief Complaint   Patient presents with    Anxiety     refills    Neck Pain     The patient is a 50-year-old  female who presents to the clinic today for routine follow-up regarding anxiety and depression. ,  Panic attacks, insomnia, HTN, generalized Arthralgia. Patient tolerating all prescribed medications without any adverse side effects. Continues with current plan of care in treating diagnosis. Continue to stay well hydrated throughout the day, limit caffeine, regular exercise discussed with the patient. Heathy diet discussed with the patient. Health maintenance will be revised and discussed with the patient. Medications refills requested. Routine blood work due today. Otherwise baseline status. Hypertension  This is a chronic problem. The current episode started more than 1 year ago. The problem has been waxing and waning since onset. The problem is controlled. Associated symptoms include anxiety and neck pain. Pertinent negatives include no chest pain, headaches, palpitations or shortness of breath. Risk factors for coronary artery disease include stress. Past treatments include diuretics and lifestyle changes. The current treatment provides moderate improvement. Compliance problems include diet and exercise. Neck Pain   This is a new problem. The current episode started 1 to 4 weeks ago. The problem has been waxing and waning. The pain is associated with lifting a heavy object. The quality of the pain is described as aching. The pain is at a severity of 4/10. The pain is moderate. The symptoms are aggravated by stress, bending and position. The pain is worse during the night. Stiffness is present in the morning. Associated symptoms include leg pain.  Pertinent negatives include no chest pain, fever, headaches, numbness, pain with swallowing, paresis, photophobia, syncope, tingling, trouble swallowing, visual change, weakness or weight loss. She has tried NSAIDs for the symptoms. The treatment provided mild relief. Active Ambulatory Problems     Diagnosis Date Noted    Primary osteoarthritis of both knees 2016    Postmenopausal HRT (hormone replacement therapy) 2016    Chronic pain of both knees 2019    Allergic rhinitis 2019    Anxiety 2019    Breast nodule 2019     Resolved Ambulatory Problems     Diagnosis Date Noted    Acute maxillary sinusitis 2016    Essential hypertension 2016     Past Medical History:   Diagnosis Date    Hypertension       No Known Allergies   Past Surgical History:   Procedure Laterality Date     SECTION      times 2    PATELLA SURGERY      plastic      Family History   Problem Relation Age of Onset    Cancer Mother         ovarian and liver    Stroke Mother     Cancer Father         liver    Cancer Paternal Grandmother         kidney    Breast Cancer Sister        Patient's medications, allergies, past medical, surgical, social and family histories were reviewed and updated as appropriate. Current Outpatient Medications on File Prior to Visit   Medication Sig Dispense Refill    lisinopril (PRINIVIL;ZESTRIL) 10 MG tablet TAKE ONE TABLET BY MOUTH DAILY 30 tablet 5    montelukast (SINGULAIR) 10 MG tablet TAKE ONE TABLET BY MOUTH ONCE NIGHTLY 30 tablet 5    progesterone (PROMETRIUM) 100 MG capsule TAKE ONE CAPSULE BY MOUTH DAILY 30 capsule 5    albuterol sulfate  (90 Base) MCG/ACT inhaler Inhale 2 puffs into the lungs every 6 hours as needed for Wheezing 1 Inhaler 3    albuterol sulfate HFA (VENTOLIN HFA) 108 (90 Base) MCG/ACT inhaler Inhale 2 puffs into the lungs every 6 hours as needed for Wheezing or Shortness of Breath 1 Inhaler 2     No current facility-administered medications on file prior to visit. Review of Systems   Constitutional: Positive for fatigue.  Negative for activity change, appetite change, chills, fever and weight loss. HENT: Negative for congestion, ear pain, nosebleeds, sore throat and trouble swallowing. Eyes: Negative for photophobia, pain and redness. Respiratory: Negative for cough, chest tightness and shortness of breath. Cardiovascular: Negative for chest pain, palpitations, leg swelling and syncope. Gastrointestinal: Negative for abdominal pain, diarrhea, nausea and vomiting. Genitourinary: Negative for difficulty urinating, dysuria and flank pain. Musculoskeletal: Positive for arthralgias, myalgias and neck pain. Negative for back pain and gait problem. Skin: Negative for color change, pallor, rash and wound. Allergic/Immunologic: Positive for environmental allergies. Negative for food allergies. Neurological: Negative for dizziness, tingling, weakness, numbness and headaches. Hematological: Negative for adenopathy. Does not bruise/bleed easily. Psychiatric/Behavioral: Positive for agitation, decreased concentration and sleep disturbance. The patient is nervous/anxious and is hyperactive. OBJECTIVE:  /72   Pulse 88   Temp 97.7 °F (36.5 °C) (Infrared)   Resp 16   Ht 5' 4\" (1.626 m)   Wt 159 lb (72.1 kg)   SpO2 99%   BMI 27.29 kg/m²       Physical Exam  Vitals signs and nursing note reviewed. Constitutional:       General: She is not in acute distress. Appearance: Normal appearance. She is well-developed. She is not ill-appearing, toxic-appearing or diaphoretic. HENT:      Head: Normocephalic and atraumatic. Right Ear: Hearing, tympanic membrane, ear canal and external ear normal. There is no impacted cerumen. Left Ear: Hearing, tympanic membrane, ear canal and external ear normal. There is no impacted cerumen. Nose: Nose normal. No laceration, mucosal edema, congestion or rhinorrhea. Right Sinus: No maxillary sinus tenderness or frontal sinus tenderness. Left Sinus: No maxillary sinus tenderness or frontal sinus tenderness. Capillary Refill: Capillary refill takes less than 2 seconds. Coloration: Skin is not pale. Findings: No bruising, erythema or rash. Neurological:      General: No focal deficit present. Mental Status: She is alert and oriented to person, place, and time. Mental status is at baseline. She is not disoriented. GCS: GCS eye subscore is 4. GCS verbal subscore is 5. GCS motor subscore is 6. Cranial Nerves: No cranial nerve deficit. Sensory: No sensory deficit. Motor: No abnormal muscle tone. Coordination: Coordination normal.      Deep Tendon Reflexes: Reflexes normal.   Psychiatric:         Attention and Perception: Attention normal. She is attentive. Mood and Affect: Mood normal. Mood is not anxious or depressed. Speech: Speech normal. Speech is not rapid and pressured. Behavior: Behavior normal. Behavior is cooperative. Thought Content: Thought content normal.         Cognition and Memory: Cognition normal.         Judgment: Judgment is impulsive.          Results in Past 30 Days  Result Component Current Result Ref Range Previous Result Ref Range   Albumin/Globulin Ratio 1.4 (4/15/2021) 0.8 - 2.0 1.6 (4/8/2021) 0.8 - 2.0   Albumin 3.7 (4/15/2021) 3.4 - 4.8 g/dL 4.6 (4/8/2021) 3.4 - 4.8 g/dL   Alkaline Phosphatase 116 (H) (4/15/2021) 25 - 100 U/L 133 (H) (4/8/2021) 25 - 100 U/L   ALT 26 (4/15/2021) 4 - 36 U/L 17 (4/8/2021) 4 - 36 U/L   AST 64 (H) (4/15/2021) 8 - 33 U/L 37 (H) (4/8/2021) 8 - 33 U/L   BUN 14 (4/15/2021) 6 - 20 mg/dL 13 (4/8/2021) 6 - 20 mg/dL   Calcium 9.0 (4/15/2021) 8.5 - 10.5 mg/dL 9.5 (4/8/2021) 8.5 - 10.5 mg/dL   Chloride 108 (H) (4/15/2021) 98 - 107 mmol/L 92 (L) (4/8/2021) 98 - 107 mmol/L   CO2 27 (4/15/2021) 20 - 30 mmol/L 28 (4/8/2021) 20 - 30 mmol/L   CREATININE <0.5 (4/15/2021) 0.4 - 1.2 mg/dL 0.6 (4/8/2021) 0.4 - 1.2 mg/dL   GFR  >59 (4/15/2021) >59 >59 (4/8/2021) >59   GFR Non- >60 (4/15/2021) >59 >60 (4/8/2021) >59   Globulin 2.6 (4/15/2021) g/dL 2.8 (4/8/2021) g/dL   Glucose 102 (4/15/2021) 74 - 106 mg/dL 76 (4/8/2021) 74 - 106 mg/dL   Potassium 4.6 (4/15/2021) 3.4 - 5.1 mmol/L 2.7 (LL) (4/8/2021) 3.4 - 5.1 mmol/L   Sodium 145 (4/15/2021) 136 - 145 mmol/L 136 (4/8/2021) 136 - 145 mmol/L   Total Bilirubin 0.3 (4/15/2021) 0.3 - 1.2 mg/dL 1.0 (4/8/2021) 0.3 - 1.2 mg/dL   Total Protein 6.3 (L) (4/15/2021) 6.4 - 8.3 g/dL 7.4 (4/8/2021) 6.4 - 8.3 g/dL       Hemoglobin A1C (%)   Date Value   04/08/2021 4.9     LDL Calculated (mg/dL)   Date Value   04/08/2021 107         Lab Results   Component Value Date    WBC 10.7 04/08/2021    NEUTROABS 6.8 04/08/2021    HGB 17.3 04/08/2021    HCT 52.1 04/08/2021    .3 04/08/2021     04/08/2021       Lab Results   Component Value Date    TSH 0.70 05/08/2014          ASSESSMENT/PLAN:     Laverne Wright was seen today for anxiety and neck pain. Diagnoses and all orders for this visit:    Anxiety  -     clonazePAM (KLONOPIN) 1 MG tablet; Take 1 tablet by mouth 3 times daily as needed for Anxiety (insomnia) for up to 30 days.  -     TN DEPRESSION SCREEN ANNUAL    Depression, unspecified depression type  -     clonazePAM (KLONOPIN) 1 MG tablet; Take 1 tablet by mouth 3 times daily as needed for Anxiety (insomnia) for up to 30 days.  -     TN DEPRESSION SCREEN ANNUAL    Insomnia, unspecified type  -     clonazePAM (KLONOPIN) 1 MG tablet; Take 1 tablet by mouth 3 times daily as needed for Anxiety (insomnia) for up to 30 days. Menopause  -     estradiol (CLIMARA) 0.025 MG/24HR; APPLY ONE PATCH TO THE SKIN ONCE WEEKLY  -     CBC Auto Differential; Future  -     Comprehensive Metabolic Panel; Future  -     TSH with Reflex;  Future    Non-seasonal allergic rhinitis, unspecified trigger  -     fluticasone (FLONASE) 50 MCG/ACT nasal spray; SPRAY TWO SPRAYS IN EACH NOSTRIL ONCE DAILY    Gastroesophageal reflux disease without esophagitis  -     omeprazole (PRILOSEC) 40 MG delayed release capsule; Take 1 capsule by mouth every morning (before breakfast)    Primary osteoarthritis of both knees  -     cyclobenzaprine (FLEXERIL) 5 MG tablet; Take 1 tablet by mouth 3 times daily as needed for Muscle spasms  -     diclofenac (VOLTAREN) 50 MG EC tablet; Take 1 tablet by mouth 2 times daily  -     diclofenac sodium (VOLTAREN) 1 % GEL; Apply 2 g topically 2 times daily    Essential hypertension  -     CBC Auto Differential; Future  -     Comprehensive Metabolic Panel; Future  -     TSH with Reflex; Future  -     Lipid Panel; Future    Other fatigue  -     CBC Auto Differential; Future  -     Comprehensive Metabolic Panel; Future  -     TSH with Reflex; Future  -     Lipid Panel; Future  -     Vitamin B12 & Folate; Future  -     Hemoglobin A1C; Future    Vitamin D deficiency  -     Vitamin D 25 Hydroxy; Future       Patient tolerating all prescribed medications without any adverse side effects. Continues with current plan of care in treating diagnosis. Continue to stay well hydrated throughout the day, limit caffeine, regular exercise discussed with the patient. Heathy diet discussed with the patient. Controlled Substances Monitoring:     RX Monitoring 4/8/2021   Attestation The Prescription Monitoring Report for this patient was reviewed today. Periodic Controlled Substance Monitoring Random urine drug screen sent today. ;Assessed functional status. ;No signs of potential drug abuse or diversion identified. ;Possible medication side effects, risk of tolerance/dependence & alternative treatments discussed. PATIENT COUNSELING     Counseling was provided today regarding the following topics: Healthy eating habits, Regular exercise, substance abuse and healthy sleep habits. Discussed use, benefit, and side effects of prescribed medications. Barriers to medication compliance addressed. All patient questions answered. Patient voiced understanding.      Medications Discontinued During This Encounter   Medication Reason    vilazodone HCl (VIIBRYD) 20 MG TABS LIST CLEANUP    estradiol (CLIMARA) 0.025 MG/24HR REORDER    clonazePAM (KLONOPIN) 1 MG tablet REORDER    fluticasone (FLONASE) 50 MCG/ACT nasal spray REORDER    tiZANidine (ZANAFLEX) 4 MG tablet LIST CLEANUP    diclofenac (VOLTAREN) 50 MG EC tablet REORDER       Return in 1 month (on 5/8/2021), or if symptoms worsen or fail to improve, for VV. SANTINO Daniel - CNP     Education was provided for discussed topics. Call the office with worsening complaints or any side effects to any medications. If an emergency please call 911.

## 2021-04-09 ENCOUNTER — TELEPHONE (OUTPATIENT)
Dept: FAMILY MEDICINE CLINIC | Age: 56
End: 2021-04-09

## 2021-04-09 DIAGNOSIS — E87.6 HYPOKALEMIA: Primary | ICD-10-CM

## 2021-04-09 LAB
A/G RATIO: 1.6 (ref 0.8–2)
ALBUMIN SERPL-MCNC: 4.6 G/DL (ref 3.4–4.8)
ALP BLD-CCNC: 133 U/L (ref 25–100)
ALT SERPL-CCNC: 17 U/L (ref 4–36)
ANION GAP SERPL CALCULATED.3IONS-SCNC: 16 MMOL/L (ref 3–16)
AST SERPL-CCNC: 37 U/L (ref 8–33)
BASOPHILS ABSOLUTE: 0.1 K/UL (ref 0–0.1)
BASOPHILS RELATIVE PERCENT: 0.8 %
BILIRUB SERPL-MCNC: 1 MG/DL (ref 0.3–1.2)
BUN BLDV-MCNC: 13 MG/DL (ref 6–20)
CALCIUM SERPL-MCNC: 9.5 MG/DL (ref 8.5–10.5)
CHLORIDE BLD-SCNC: 92 MMOL/L (ref 98–107)
CHOLESTEROL, TOTAL: 211 MG/DL (ref 0–200)
CO2: 28 MMOL/L (ref 20–30)
CREAT SERPL-MCNC: 0.6 MG/DL (ref 0.4–1.2)
EOSINOPHILS ABSOLUTE: 0.2 K/UL (ref 0–0.4)
EOSINOPHILS RELATIVE PERCENT: 1.8 %
FOLATE: 3.22 NG/ML
GFR AFRICAN AMERICAN: >59
GFR NON-AFRICAN AMERICAN: >60
GLOBULIN: 2.8 G/DL
GLUCOSE BLD-MCNC: 76 MG/DL (ref 74–106)
HBA1C MFR BLD: 4.9 %
HCT VFR BLD CALC: 52.1 % (ref 37–47)
HDLC SERPL-MCNC: 82 MG/DL (ref 40–60)
HEMOGLOBIN: 17.3 G/DL (ref 11.5–16.5)
IMMATURE GRANULOCYTES #: 0.1 K/UL
IMMATURE GRANULOCYTES %: 0.6 % (ref 0–5)
LDL CHOLESTEROL CALCULATED: 107 MG/DL
LYMPHOCYTES ABSOLUTE: 2.6 K/UL (ref 1.5–4)
LYMPHOCYTES RELATIVE PERCENT: 24.6 %
MCH RBC QN AUTO: 34.9 PG (ref 27–32)
MCHC RBC AUTO-ENTMCNC: 33.2 G/DL (ref 31–35)
MCV RBC AUTO: 105.3 FL (ref 80–100)
MONOCYTES ABSOLUTE: 0.9 K/UL (ref 0.2–0.8)
MONOCYTES RELATIVE PERCENT: 8.8 %
NEUTROPHILS ABSOLUTE: 6.8 K/UL (ref 2–7.5)
NEUTROPHILS RELATIVE PERCENT: 63.4 %
PDW BLD-RTO: 14.4 % (ref 11–16)
PLATELET # BLD: 158 K/UL (ref 150–400)
PMV BLD AUTO: 13 FL (ref 6–10)
POTASSIUM SERPL-SCNC: 2.7 MMOL/L (ref 3.4–5.1)
RBC # BLD: 4.95 M/UL (ref 3.8–5.8)
SODIUM BLD-SCNC: 136 MMOL/L (ref 136–145)
TOTAL PROTEIN: 7.4 G/DL (ref 6.4–8.3)
TRIGL SERPL-MCNC: 110 MG/DL (ref 0–249)
TSH REFLEX: 0.77 UIU/ML (ref 0.27–4.2)
VITAMIN B-12: 483 PG/ML (ref 211–911)
VITAMIN D 25-HYDROXY: 35.1 (ref 32–100)
VLDLC SERPL CALC-MCNC: 22 MG/DL
WBC # BLD: 10.7 K/UL (ref 4–11)

## 2021-04-09 RX ORDER — LANOLIN ALCOHOL/MO/W.PET/CERES
400 CREAM (GRAM) TOPICAL DAILY
Qty: 30 TABLET | Refills: 0 | Status: SHIPPED | OUTPATIENT
Start: 2021-04-09 | End: 2021-05-06

## 2021-04-09 RX ORDER — POTASSIUM CHLORIDE 20 MEQ/1
20 TABLET, EXTENDED RELEASE ORAL 2 TIMES DAILY
Qty: 60 TABLET | Refills: 0 | Status: SHIPPED | OUTPATIENT
Start: 2021-04-09 | End: 2021-05-06

## 2021-04-12 ENCOUNTER — TELEPHONE (OUTPATIENT)
Dept: FAMILY MEDICINE CLINIC | Age: 56
End: 2021-04-12

## 2021-04-13 NOTE — TELEPHONE ENCOUNTER
Diagnoses and all orders for this visit:    Hypokalemia  -     potassium chloride (KLOR-CON M) 20 MEQ extended release tablet; Take 1 tablet by mouth 2 times daily  -     COMPREHENSIVE METABOLIC PANEL; Future  -     folic acid (FOLVITE) 411 MCG tablet;  Take 1 tablet by mouth daily

## 2021-04-15 ENCOUNTER — HOSPITAL ENCOUNTER (OUTPATIENT)
Facility: HOSPITAL | Age: 56
Discharge: HOME | End: 2021-04-15
Payer: MEDICAID

## 2021-04-15 DIAGNOSIS — E87.6 HYPOKALEMIA: ICD-10-CM

## 2021-04-15 LAB
A/G RATIO: 1.4 (ref 0.8–2)
ALBUMIN SERPL-MCNC: 3.7 G/DL (ref 3.4–4.8)
ALP BLD-CCNC: 116 U/L (ref 25–100)
ALT SERPL-CCNC: 26 U/L (ref 4–36)
ANION GAP SERPL CALCULATED.3IONS-SCNC: 10 MMOL/L (ref 3–16)
AST SERPL-CCNC: 64 U/L (ref 8–33)
BILIRUB SERPL-MCNC: 0.3 MG/DL (ref 0.3–1.2)
BUN BLDV-MCNC: 14 MG/DL (ref 6–20)
CALCIUM SERPL-MCNC: 9 MG/DL (ref 8.5–10.5)
CHLORIDE BLD-SCNC: 108 MMOL/L (ref 98–107)
CO2: 27 MMOL/L (ref 20–30)
CREAT SERPL-MCNC: <0.5 MG/DL (ref 0.4–1.2)
GFR AFRICAN AMERICAN: >59
GFR NON-AFRICAN AMERICAN: >60
GLOBULIN: 2.6 G/DL
GLUCOSE BLD-MCNC: 102 MG/DL (ref 74–106)
POTASSIUM SERPL-SCNC: 4.6 MMOL/L (ref 3.4–5.1)
SODIUM BLD-SCNC: 145 MMOL/L (ref 136–145)
TOTAL PROTEIN: 6.3 G/DL (ref 6.4–8.3)

## 2021-04-15 PROCEDURE — 80053 COMPREHEN METABOLIC PANEL: CPT

## 2021-04-15 PROCEDURE — 36415 COLL VENOUS BLD VENIPUNCTURE: CPT

## 2021-04-21 NOTE — TELEPHONE ENCOUNTER
Morena,     Will you send in one that is covered? I think they use the mail order services.     SANTINO Reeves

## 2021-04-25 ASSESSMENT — PATIENT HEALTH QUESTIONNAIRE - PHQ9
SUM OF ALL RESPONSES TO PHQ9 QUESTIONS 1 & 2: 2
2. FEELING DOWN, DEPRESSED OR HOPELESS: 1

## 2021-04-25 ASSESSMENT — ENCOUNTER SYMPTOMS
PHOTOPHOBIA: 0
VISUAL CHANGE: 0

## 2021-05-06 DIAGNOSIS — E87.6 HYPOKALEMIA: ICD-10-CM

## 2021-05-06 RX ORDER — LANOLIN ALCOHOL/MO/W.PET/CERES
CREAM (GRAM) TOPICAL
Qty: 30 TABLET | Refills: 0 | Status: SHIPPED | OUTPATIENT
Start: 2021-05-06 | End: 2021-05-17 | Stop reason: SDUPTHER

## 2021-05-06 RX ORDER — POTASSIUM CHLORIDE 1500 MG/1
TABLET, EXTENDED RELEASE ORAL
Qty: 60 TABLET | Refills: 0 | Status: SHIPPED | OUTPATIENT
Start: 2021-05-06 | End: 2021-05-17

## 2021-05-06 NOTE — TELEPHONE ENCOUNTER
Refill request received from pharmacy.  Medication pending for approval.       Last Office Visit With PCP:  4/8/2021    Next Visit Date:  Future Appointments   Date Time Provider Jeaneth Inman   5/17/2021  1:30 PM SANTINO Jim - CNP [address] up to date

## 2021-05-17 ENCOUNTER — VIRTUAL VISIT (OUTPATIENT)
Dept: FAMILY MEDICINE CLINIC | Age: 56
End: 2021-05-17
Payer: MEDICAID

## 2021-05-17 DIAGNOSIS — J30.89 NON-SEASONAL ALLERGIC RHINITIS, UNSPECIFIED TRIGGER: ICD-10-CM

## 2021-05-17 DIAGNOSIS — Z86.39 HISTORY OF HYPOKALEMIA: ICD-10-CM

## 2021-05-17 DIAGNOSIS — F41.8 MIXED ANXIETY DEPRESSIVE DISORDER: ICD-10-CM

## 2021-05-17 DIAGNOSIS — E53.8 FOLIC ACID DEFICIENCY: ICD-10-CM

## 2021-05-17 DIAGNOSIS — R53.83 OTHER FATIGUE: Primary | ICD-10-CM

## 2021-05-17 PROCEDURE — 99441 PR PHYS/QHP TELEPHONE EVALUATION 5-10 MIN: CPT | Performed by: NURSE PRACTITIONER

## 2021-05-17 RX ORDER — FLUTICASONE PROPIONATE 50 MCG
SPRAY, SUSPENSION (ML) NASAL
Qty: 1 BOTTLE | Refills: 3 | Status: SHIPPED | OUTPATIENT
Start: 2021-05-17 | End: 2021-09-13 | Stop reason: SDUPTHER

## 2021-05-17 RX ORDER — LANOLIN ALCOHOL/MO/W.PET/CERES
CREAM (GRAM) TOPICAL
Qty: 90 TABLET | Refills: 1 | Status: SHIPPED | OUTPATIENT
Start: 2021-05-17 | End: 2021-09-13 | Stop reason: ALTCHOICE

## 2021-05-17 NOTE — PROGRESS NOTES
SUBJECTIVE:    Patient ID: Rosalee Boeck is a 48 y. o.female. Chief Complaint   Patient presents with    Other     hypokalemia f/u, needs refills         HPI:    Rosalee Boeck is a 48 y.o. female evaluated via telephone on 5/17/2021. Consent:  She and/or health care decision maker is aware that that she may receive a bill for this telephone service, depending on her insurance coverage, and has provided verbal consent to proceed: Yes  Documentation:  I communicated with the patient and/or health care decision maker about hypokalemia, folic acid def, other issues  Details of this discussion including any medical advice provided: to patient  I affirm this is a Patient Initiated Episode with a Patient who has not had a related appointment within my department in the past 7 days or scheduled within the next 24 hours. Patient identification was verified at the start of the visit: Yes  Total Time: minutes: 5-10 minutes  The visit was conducted pursuant to the emergency declaration under the [address] [place] [address], [address] [place] [address] authority and the [employer] and [employer] Act. Patient identification was verified, and a caregiver was present when appropriate. The patient was located in a state where the provider was credentialed to provide care. Note: not billable if this call serves to triage the patient into an appointment for the relevant concern  SANTINO Bauer CNP         Pt visit for f/u Hx hypokalemia 4/8/21 and resolved after oral replacement. Hx folic acid def and on daily oral replacement and reports she feels better since taking the folic acid. Reports she needs Potassium and folic acid refills. Last K 4.6 on 4/15/21. Has been taking 20meq potassium daily and K has been WNL. Also, Needs flonase refilled and benefits her allergic rhinitis. Hx anxiety, depression and controlled on current regimen with no issues.  Hx fatigue and taking 04/15/2021    K 4.6 04/15/2021     04/15/2021    CO2 27 04/15/2021    GLUCOSE 102 04/15/2021    BUN 14 04/15/2021    CREATININE <0.5 04/15/2021    CALCIUM 9.0 04/15/2021    PROT 6.3 04/15/2021    LABALBU 3.7 04/15/2021    BILITOT 0.3 04/15/2021    ALT 26 04/15/2021    AST 64 04/15/2021       Hemoglobin A1C (%)   Date Value   04/08/2021 4.9     LDL Calculated (mg/dL)   Date Value   04/08/2021 107         Lab Results   Component Value Date    WBC 10.7 04/08/2021    NEUTROABS 6.8 04/08/2021    HGB 17.3 04/08/2021    HCT 52.1 04/08/2021    .3 04/08/2021     04/08/2021       Lab Results   Component Value Date    TSH 0.70 05/08/2014         ASSESSMENT/PLAN:     1. Non-seasonal allergic rhinitis, unspecified trigger  Take medications as directed. RTC if needed. - fluticasone (FLONASE) 50 MCG/ACT nasal spray; SPRAY TWO SPRAYS IN EACH NOSTRIL ONCE DAILY  Dispense: 1 Bottle; Refill: 3    2. Folic acid deficiency  Cont daily supplement. Start multivitamin too. F/u PRN    - folic acid (FOLVITE) 041 MCG tablet; TAKE ONE TABLET BY MOUTH DAILY  Dispense: 90 tablet; Refill: 1  - Multiple Vitamins-Minerals (MULTIVITAMIN ADULTS) TABS; Take 1 tab daily with food  Dispense: 90 tablet; Refill: 1    3. Mixed anxiety depressive disorder  Cont current regimen. F/u as scheduled. 4. Other fatigue  Take medications as prescribed. Continue home medications. Discussed symptom management. Return to clinic S&S worsen or no improvement noted. Patient verbalized understanding.     - folic acid (FOLVITE) 936 MCG tablet; TAKE ONE TABLET BY MOUTH DAILY  Dispense: 90 tablet; Refill: 1  - Multiple Vitamins-Minerals (MULTIVITAMIN ADULTS) TABS; Take 1 tab daily with food  Dispense: 90 tablet; Refill: 1    5. Hx hypokalemia  Resolved. Stop taking daily potassium. Encouraged electrolyte drinks daily. Increase foods with potassium into diet. Education provided. Pt agreeable.  F/u PRN    Orders Placed This Encounter   Medications

## 2021-06-01 ASSESSMENT — ENCOUNTER SYMPTOMS
RESPIRATORY NEGATIVE: 1
EYES NEGATIVE: 1
GASTROINTESTINAL NEGATIVE: 1

## 2021-06-22 DIAGNOSIS — F41.9 ANXIETY: ICD-10-CM

## 2021-06-22 DIAGNOSIS — G47.00 INSOMNIA, UNSPECIFIED TYPE: ICD-10-CM

## 2021-06-22 DIAGNOSIS — F32.A DEPRESSION, UNSPECIFIED DEPRESSION TYPE: ICD-10-CM

## 2021-06-22 RX ORDER — CLONAZEPAM 1 MG/1
1 TABLET ORAL 3 TIMES DAILY PRN
Qty: 90 TABLET | Refills: 1 | Status: SHIPPED | OUTPATIENT
Start: 2021-06-22 | End: 2021-09-13

## 2021-09-07 ENCOUNTER — TELEPHONE (OUTPATIENT)
Dept: FAMILY MEDICINE CLINIC | Age: 56
End: 2021-09-07

## 2021-09-07 NOTE — TELEPHONE ENCOUNTER
Appointment was made for Monday at 1 with [facility]. Patient was a little upset that Siddhartha Silvacal had left and she has been through several providers over the course of her coming to [facility]. Michelle Sanabria said that she would give [facility] a try.

## 2021-09-07 NOTE — TELEPHONE ENCOUNTER
----- Message from Pedro Luis Lull sent at 9/7/2021  9:56 AM EDT -----  Subject: Appointment Request    Reason for Call: Routine Physical Exam    QUESTIONS  Type of Appointment? Established Patient  Reason for appointment request? No appointments available during search  Additional Information for Provider? No appts for Dr. Rain Villegas Pt would   like to see her Dr. Please call pt to schedule for med refills and check   up  ---------------------------------------------------------------------------  --------------  CALL BACK INFO  What is the best way for the office to contact you? OK to leave message on   voicemail  Preferred Call Back Phone Number? [phone]  ---------------------------------------------------------------------------  --------------  SCRIPT ANSWERS  Relationship to Patient? Self  (If the patient has Medicare as their primary insurance coverage ask this   question) Are you requesting a Medicare Annual Wellness Visit? No  (Is the patient requesting a pap smear with their physical exam?)? No  (Is the patient requesting their annual physical and does not need PAP or   AWV per above?)? Yes   Have you been diagnosed with, awaiting test results for, or told that you   are suspected of having COVID-19 (Coronavirus)? (If patient has tested   negative or was tested as a requirement for work, school, or travel and   not based on symptoms, answer no)? No  Do you currently have flu-like symptoms including fever or chills, cough,   shortness of breath, difficulty breathing, or new loss of taste or smell? No  Have you had close contact with someone with COVID-19 in the last 14 days? No  (Service Expert  click yes below to proceed with [employer] As Usual   Scheduling)?  Yes

## 2021-09-10 DIAGNOSIS — J30.89 NON-SEASONAL ALLERGIC RHINITIS, UNSPECIFIED TRIGGER: ICD-10-CM

## 2021-09-10 RX ORDER — MONTELUKAST SODIUM 10 MG/1
TABLET ORAL
Qty: 30 TABLET | Refills: 5 | Status: SHIPPED | OUTPATIENT
Start: 2021-09-10 | End: 2021-09-13 | Stop reason: SDUPTHER

## 2021-09-13 ENCOUNTER — OFFICE VISIT (OUTPATIENT)
Dept: FAMILY MEDICINE CLINIC | Age: 56
End: 2021-09-13
Payer: MEDICAID

## 2021-09-13 VITALS
SYSTOLIC BLOOD PRESSURE: 118 MMHG | BODY MASS INDEX: 27.01 KG/M2 | OXYGEN SATURATION: 98 % | HEIGHT: 64 IN | WEIGHT: 158.2 LBS | RESPIRATION RATE: 18 BRPM | HEART RATE: 88 BPM | DIASTOLIC BLOOD PRESSURE: 70 MMHG | TEMPERATURE: 96.8 F

## 2021-09-13 DIAGNOSIS — G47.00 INSOMNIA, UNSPECIFIED TYPE: ICD-10-CM

## 2021-09-13 DIAGNOSIS — F41.9 ANXIETY: ICD-10-CM

## 2021-09-13 DIAGNOSIS — J30.89 NON-SEASONAL ALLERGIC RHINITIS, UNSPECIFIED TRIGGER: ICD-10-CM

## 2021-09-13 DIAGNOSIS — I10 ESSENTIAL HYPERTENSION: ICD-10-CM

## 2021-09-13 DIAGNOSIS — F32.A DEPRESSION, UNSPECIFIED DEPRESSION TYPE: ICD-10-CM

## 2021-09-13 DIAGNOSIS — Z11.59 NEED FOR HEPATITIS C SCREENING TEST: ICD-10-CM

## 2021-09-13 DIAGNOSIS — Z11.4 ENCOUNTER FOR SCREENING FOR HIV: ICD-10-CM

## 2021-09-13 DIAGNOSIS — B37.2 YEAST DERMATITIS: ICD-10-CM

## 2021-09-13 DIAGNOSIS — F19.20 CONTROLLED DRUG DEPENDENCE (HCC): Primary | ICD-10-CM

## 2021-09-13 DIAGNOSIS — J30.2 SEASONAL ALLERGIES: ICD-10-CM

## 2021-09-13 PROCEDURE — 99214 OFFICE O/P EST MOD 30 MIN: CPT | Performed by: NURSE PRACTITIONER

## 2021-09-13 RX ORDER — NYSTATIN 100000 U/G
CREAM TOPICAL
Qty: 15 G | Refills: 2 | Status: SHIPPED | OUTPATIENT
Start: 2021-09-13

## 2021-09-13 RX ORDER — HYDROXYZINE PAMOATE 25 MG/1
25 CAPSULE ORAL 3 TIMES DAILY PRN
Qty: 30 CAPSULE | Refills: 2 | Status: SHIPPED | OUTPATIENT
Start: 2021-09-13 | End: 2021-10-13 | Stop reason: SDUPTHER

## 2021-09-13 RX ORDER — LISINOPRIL 10 MG/1
TABLET ORAL
Qty: 30 TABLET | Refills: 5 | Status: SHIPPED | OUTPATIENT
Start: 2021-09-13 | End: 2022-03-21

## 2021-09-13 RX ORDER — MONTELUKAST SODIUM 10 MG/1
TABLET ORAL
Qty: 30 TABLET | Refills: 5 | Status: SHIPPED | OUTPATIENT
Start: 2021-09-13 | End: 2022-10-24 | Stop reason: SDUPTHER

## 2021-09-13 RX ORDER — FLUTICASONE PROPIONATE 50 MCG
SPRAY, SUSPENSION (ML) NASAL
Qty: 1 EACH | Refills: 2 | Status: SHIPPED | OUTPATIENT
Start: 2021-09-13 | End: 2022-10-24 | Stop reason: SDUPTHER

## 2021-09-13 RX ORDER — CLONAZEPAM 1 MG/1
0.5 TABLET ORAL NIGHTLY PRN
Qty: 15 TABLET | Refills: 0 | Status: SHIPPED | OUTPATIENT
Start: 2021-09-13 | End: 2022-10-24
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
## 2021-09-13 NOTE — PROGRESS NOTES
Chief Complaint   Patient presents with    Anxiety     f/u       Have you seen any other physician or provider since your last visit no    Have you had any other diagnostic tests since your last visit? no    Have you changed or stopped any medications since your last visit? no     Patient has Cologuard kit at home.

## 2021-10-05 DIAGNOSIS — K21.9 GASTROESOPHAGEAL REFLUX DISEASE WITHOUT ESOPHAGITIS: ICD-10-CM

## 2021-10-05 RX ORDER — OMEPRAZOLE 40 MG/1
40 CAPSULE, DELAYED RELEASE ORAL
Qty: 90 CAPSULE | Refills: 1 | Status: SHIPPED | OUTPATIENT
Start: 2021-10-05

## 2021-10-13 ENCOUNTER — VIRTUAL VISIT (OUTPATIENT)
Dept: FAMILY MEDICINE CLINIC | Age: 56
End: 2021-10-13
Payer: MEDICAID

## 2021-10-13 DIAGNOSIS — F41.9 ANXIETY: ICD-10-CM

## 2021-10-13 DIAGNOSIS — J02.9 ACUTE VIRAL PHARYNGITIS: Primary | ICD-10-CM

## 2021-10-13 DIAGNOSIS — F51.01 PRIMARY INSOMNIA: ICD-10-CM

## 2021-10-13 DIAGNOSIS — H92.02 OTALGIA OF LEFT EAR: ICD-10-CM

## 2021-10-13 DIAGNOSIS — B37.9 YEAST INFECTION: ICD-10-CM

## 2021-10-13 PROBLEM — F32.A DEPRESSION: Status: ACTIVE | Noted: 2021-10-13

## 2021-10-13 PROCEDURE — 99213 OFFICE O/P EST LOW 20 MIN: CPT | Performed by: NURSE PRACTITIONER

## 2021-10-13 RX ORDER — AMOXICILLIN 500 MG/1
500 CAPSULE ORAL 2 TIMES DAILY
Qty: 20 CAPSULE | Refills: 0 | Status: SHIPPED | OUTPATIENT
Start: 2021-10-13 | End: 2021-10-23

## 2021-10-13 RX ORDER — HYDROXYZINE PAMOATE 25 MG/1
25 CAPSULE ORAL 3 TIMES DAILY PRN
Qty: 30 CAPSULE | Refills: 2 | Status: SHIPPED | OUTPATIENT
Start: 2021-10-13 | End: 2021-11-12

## 2021-10-13 RX ORDER — FLUCONAZOLE 100 MG/1
150 TABLET ORAL DAILY
Qty: 1 TABLET | Refills: 0 | Status: SHIPPED | OUTPATIENT
Start: 2021-10-13 | End: 2021-10-14

## 2021-10-13 ASSESSMENT — ENCOUNTER SYMPTOMS: SORE THROAT: 1

## 2021-10-13 NOTE — PROGRESS NOTES
10/13/2021    TELEHEALTH EVALUATION -- Audio/Visual (During RBUUP-44 public health emergency)    HPI:    Kelly Coronel (:  1965) has requested an audio/video evaluation for the following concern(s):  She said her throat began to hurt 3 days ago and now her left ear is hurting. She has been using ibuprofen and tylenol for pain but it isn't helping much. She doesn't see any blisters but says it is really red. She has been chilling but says by taking tylenol and ibuprofen she has kept her fever down. She also admits she is not taking her Klonopin often at all now but does like to have it on hand and really likes the Vistaril stating it helps calm her and helps her sleep. She is requesting refill on that today. Review of Systems   HENT: Positive for ear pain and sore throat. Psychiatric/Behavioral: Positive for sleep disturbance. The patient is nervous/anxious. Prior to Visit Medications    Medication Sig Taking? Authorizing Provider   amoxicillin (AMOXIL) 500 MG capsule Take 1 capsule by mouth 2 times daily for 10 days Yes SANTINO Palafox CNP   hydrOXYzine (VISTARIL) 25 MG capsule Take 1 capsule by mouth 3 times daily as needed for Anxiety Yes SANTINO Palafox CNP   fluconazole (DIFLUCAN) 100 MG tablet Take 1.5 tablets by mouth daily for 1 day Yes SANTINO Palafox CNP   omeprazole (PRILOSEC) 40 MG delayed release capsule Take 1 capsule by mouth every morning (before breakfast) Yes SANTINO Palafox CNP   clonazePAM (KLONOPIN) 1 MG tablet Take 0.5 tablets by mouth nightly as needed for Anxiety (insomnia) for up to 30 days. Yes SANTINO Palafox CNP   lisinopril (PRINIVIL;ZESTRIL) 10 MG tablet Take as directed once per day.  Yes SANTINO Palafox CNP   montelukast (SINGULAIR) 10 MG tablet Take one tablet by mouth once nightly Yes SANTINO Palafox CNP   fluticasone (FLONASE) 50 MCG/ACT nasal spray SPRAY TWO SPRAYS IN EACH NOSTRIL ONCE DAILY Yes SANTINO Martinez CNP   nystatin (MYCOSTATIN) 586341 UNIT/GM cream Apply topically 2 times daily. Yes SANTINO Martinez CNP   Multiple Vitamins-Minerals (MULTIVITAMIN ADULTS) TABS Take 1 tab daily with food Yes SANTINO Contreras CNP   estradiol (CLIMARA) 0.025 MG/24HR APPLY ONE PATCH TO THE SKIN ONCE WEEKLY Yes Corinne MoSANTINO CNP   diclofenac sodium (VOLTAREN) 1 % GEL Apply 2 g topically 2 times daily Yes Corinne MoSANTINO - [place]   progesterone (PROMETRIUM) 100 MG capsule TAKE ONE CAPSULE BY MOUTH DAILY Yes Corinne MoSANTINO CNP   albuterol sulfate  (90 Base) MCG/ACT inhaler Inhale 2 puffs into the lungs every 6 hours as needed for Wheezing Yes Corinne MoSANTINO CNP   diclofenac (VOLTAREN) 50 MG EC tablet Take 1 tablet by mouth 2 times daily  Patient not taking: Reported on 10/13/2021  SANTINO Arzate CNP       Social History     Tobacco Use    Smoking status: Current Some Day Smoker     Packs/day: 1.00     Years: 20.00     Pack years: 20.00    Smokeless tobacco: Never Used   Substance Use Topics    Alcohol use:  Yes     Alcohol/week: 4.0 standard drinks     Types: 2 Glasses of wine, 2 Cans of beer per week    Drug use: No        Past Medical History:   Diagnosis Date    Allergic rhinitis     Hypertension        PHYSICAL EXAMINATION:  [ INSTRUCTIONS:  \"[x]\" Indicates a positive item  \"[]\" Indicates a negative item  -- DELETE ALL ITEMS NOT EXAMINED]  Vital Signs: (As obtained by patient/caregiver or practitioner observation)    Blood pressure-  Heart rate-    Respiratory rate-    Temperature-  Pulse oximetry-     Constitutional: [x] Appears well-developed and well-nourished [] No apparent distress      [] Abnormal-   Mental status  [x] Alert and awake  [x] Oriented to person/place/time [x]Able to follow commands      Eyes:  EOM    [x]  Normal  [] Abnormal-  Sclera  [x]  Normal  [] Abnormal -         Discharge [x]  None visible  [] Abnormal -    HENT:   [x] Normocephalic, atraumatic. [] Abnormal   [x] Mouth/Throat: Mucous membranes are moist.     External Ears [x] Normal  [] Abnormal-     Neck: [x] No visualized mass     Pulmonary/Chest: [x] Respiratory effort normal.  [x] No visualized signs of difficulty breathing or respiratory distress        [] Abnormal-      Musculoskeletal:   [x] Normal gait with no signs of ataxia         [x] Normal range of motion of neck        [] Abnormal-       Neurological:        [x] No Facial Asymmetry (Cranial nerve 7 motor function) (limited exam to video visit)          [x] No gaze palsy        [] Abnormal-         Skin:        [x] No significant exanthematous lesions or discoloration noted on facial skin         [] Abnormal-            Psychiatric:       [x] Normal Affect [x] No Hallucinations        [] Abnormal-     Other pertinent observable physical exam findings-     ASSESSMENT/PLAN:  1. Acute viral pharyngitis  Advised patient without being able to do a strep swab in office we are unable to let her know if she is positive for strep throat. We will treat patient prophylactically in case she does have strep pharyngitis. She is agreeable to plan of care. - amoxicillin (AMOXIL) 500 MG capsule; Take 1 capsule by mouth 2 times daily for 10 days  Dispense: 20 capsule; Refill: 0    2. Otalgia of left ear  Advised patient without being able to visualize her ear I am unable to diagnose her with an actual ear infection otitis media. But we will treat patient prophylactically like she does have an ear infection. Advised her to take medication as directed and to follow-up if signs and symptoms persist or worsen patient is agreeable to plan of care. - amoxicillin (AMOXIL) 500 MG capsule; Take 1 capsule by mouth 2 times daily for 10 days  Dispense: 20 capsule; Refill: 0    3. Anxiety  Patient admits Vistaril is controlling her anxiety and insomnia well and would like a refill.   Advised patient to take as directed she is agreeable to plan of care. - hydrOXYzine (VISTARIL) 25 MG capsule; Take 1 capsule by mouth 3 times daily as needed for Anxiety  Dispense: 30 capsule; Refill: 2    4. Primary insomnia  Patient admits Vistaril is controlling her anxiety and insomnia well and would like a refill. Advised patient to take as directed she is agreeable to plan of care. - hydrOXYzine (VISTARIL) 25 MG capsule; Take 1 capsule by mouth 3 times daily as needed for Anxiety  Dispense: 30 capsule; Refill: 2    5. Yeast infection  Patient admits with every antibiotic she develops a yeast infection. She is requesting medication to assist in preventing a yeast infection. Advised patient to take medication as directed, she is agreeable to plan of care. - fluconazole (DIFLUCAN) 100 MG tablet; Take 1.5 tablets by mouth daily for 1 day  Dispense: 1 tablet; Refill: 0      No follow-ups on file. Cornelius Rosario, was evaluated through a synchronous (real-time) audio-video encounter. The patient (or guardian if applicable) is aware that this is a billable service. Verbal consent to proceed has been obtained within the past 12 months. The visit was conducted pursuant to the emergency declaration under the [address] [place] [address], [address] [place] [address] authority and the [employer] Act. Patient identification was verified, and a caregiver was present when appropriate. The patient was located in a state where the provider was credentialed to provide care. Total time spent on this encounter: 11 mins    --SANTINO Dillon CNP on 10/13/2021 at 3:31 PM    An electronic signature was used to authenticate this note.

## 2021-10-13 NOTE — PROGRESS NOTES
Chief Complaint   Patient presents with    Pharyngitis     on left side    Otalgia     on left side    Chills       Have you seen any other physician or provider since your last visit no    Have you had any other diagnostic tests since your last visit? no    Have you changed or stopped any medications since your last visit? no

## 2021-11-24 DIAGNOSIS — M17.0 PRIMARY OSTEOARTHRITIS OF BOTH KNEES: ICD-10-CM

## 2021-11-29 DIAGNOSIS — J30.89 NON-SEASONAL ALLERGIC RHINITIS, UNSPECIFIED TRIGGER: ICD-10-CM

## 2021-11-29 RX ORDER — ALBUTEROL SULFATE 90 UG/1
2 AEROSOL, METERED RESPIRATORY (INHALATION) EVERY 6 HOURS PRN
Qty: 1 EACH | Refills: 5 | Status: SHIPPED | OUTPATIENT
Start: 2021-11-29

## 2021-11-29 NOTE — TELEPHONE ENCOUNTER
Patient called, requested refill.        Next Office Visit Date:  Future Appointments   Date Time Provider Jeaneth Inman   12/13/2021  1:00 PM Peter Gutierres, APRN - CNP [facility] Jus RODRIGUEZ please review via [phone]

## 2021-12-13 DIAGNOSIS — Z78.0 MENOPAUSE: ICD-10-CM

## 2022-02-05 DIAGNOSIS — M17.0 PRIMARY OSTEOARTHRITIS OF BOTH KNEES: ICD-10-CM

## 2022-03-20 DIAGNOSIS — I10 ESSENTIAL HYPERTENSION: ICD-10-CM

## 2022-03-21 RX ORDER — LISINOPRIL 10 MG/1
TABLET ORAL
Qty: 90 TABLET | Refills: 0 | Status: SHIPPED | OUTPATIENT
Start: 2022-03-21 | End: 2022-07-01

## 2022-06-06 DIAGNOSIS — Z78.0 MENOPAUSE: ICD-10-CM

## 2022-07-01 DIAGNOSIS — I10 ESSENTIAL HYPERTENSION: ICD-10-CM

## 2022-07-01 RX ORDER — LISINOPRIL 10 MG/1
TABLET ORAL
Qty: 30 TABLET | Refills: 0 | Status: SHIPPED | OUTPATIENT
Start: 2022-07-01 | End: 2022-10-24 | Stop reason: SDUPTHER

## 2022-07-01 NOTE — TELEPHONE ENCOUNTER
Patient called, requested refill. Next Office Visit Date:  No future appointments.     MICHAEL please review via [phone]

## 2022-07-04 DIAGNOSIS — Z78.0 MENOPAUSE: ICD-10-CM

## 2022-07-09 DIAGNOSIS — Z78.0 MENOPAUSE: ICD-10-CM

## 2022-08-02 DIAGNOSIS — I10 ESSENTIAL HYPERTENSION: ICD-10-CM

## 2022-08-02 RX ORDER — LISINOPRIL 10 MG/1
TABLET ORAL
Qty: 30 TABLET | Refills: 0 | OUTPATIENT
Start: 2022-08-02

## 2022-10-24 ENCOUNTER — HOSPITAL ENCOUNTER (OUTPATIENT)
Facility: HOSPITAL | Age: 57
Discharge: HOME | End: 2022-10-24
Payer: MEDICAID

## 2022-10-24 ENCOUNTER — OFFICE VISIT (OUTPATIENT)
Dept: FAMILY MEDICINE CLINIC | Age: 57
End: 2022-10-24
Payer: MEDICAID

## 2022-10-24 VITALS
RESPIRATION RATE: 18 BRPM | SYSTOLIC BLOOD PRESSURE: 122 MMHG | HEART RATE: 89 BPM | OXYGEN SATURATION: 97 % | TEMPERATURE: 98.2 F | BODY MASS INDEX: 24.41 KG/M2 | WEIGHT: 143 LBS | DIASTOLIC BLOOD PRESSURE: 80 MMHG | HEIGHT: 64 IN

## 2022-10-24 DIAGNOSIS — Z00.00 INITIAL MEDICARE ANNUAL WELLNESS VISIT: Primary | ICD-10-CM

## 2022-10-24 DIAGNOSIS — Z12.31 ENCOUNTER FOR SCREENING MAMMOGRAM FOR MALIGNANT NEOPLASM OF BREAST: ICD-10-CM

## 2022-10-24 DIAGNOSIS — D52.9 ANEMIA DUE TO FOLIC ACID DEFICIENCY, UNSPECIFIED DEFICIENCY TYPE: ICD-10-CM

## 2022-10-24 DIAGNOSIS — E87.6 HYPOKALEMIA: ICD-10-CM

## 2022-10-24 DIAGNOSIS — Z13.6 ENCOUNTER FOR LIPID SCREENING FOR CARDIOVASCULAR DISEASE: ICD-10-CM

## 2022-10-24 DIAGNOSIS — Z13.220 ENCOUNTER FOR LIPID SCREENING FOR CARDIOVASCULAR DISEASE: ICD-10-CM

## 2022-10-24 DIAGNOSIS — Z12.11 COLON CANCER SCREENING: ICD-10-CM

## 2022-10-24 DIAGNOSIS — E55.9 VITAMIN D DEFICIENCY: ICD-10-CM

## 2022-10-24 DIAGNOSIS — J30.89 NON-SEASONAL ALLERGIC RHINITIS, UNSPECIFIED TRIGGER: ICD-10-CM

## 2022-10-24 DIAGNOSIS — E53.8 VITAMIN B 12 DEFICIENCY: ICD-10-CM

## 2022-10-24 DIAGNOSIS — Z12.31 ENCOUNTER FOR SCREENING MAMMOGRAM FOR BREAST CANCER: ICD-10-CM

## 2022-10-24 DIAGNOSIS — J30.2 SEASONAL ALLERGIES: ICD-10-CM

## 2022-10-24 DIAGNOSIS — E53.8 FOLIC ACID DEFICIENCY: ICD-10-CM

## 2022-10-24 DIAGNOSIS — Z87.891 PERSONAL HISTORY OF TOBACCO USE: ICD-10-CM

## 2022-10-24 DIAGNOSIS — I10 ESSENTIAL HYPERTENSION: ICD-10-CM

## 2022-10-24 DIAGNOSIS — R53.83 OTHER FATIGUE: ICD-10-CM

## 2022-10-24 LAB
A/G RATIO: 2.2 (ref 0.8–2)
ALBUMIN SERPL-MCNC: 5 G/DL (ref 3.4–4.8)
ALP BLD-CCNC: 99 U/L (ref 25–100)
ALT SERPL-CCNC: 43 U/L (ref 4–36)
ANION GAP SERPL CALCULATED.3IONS-SCNC: 17 MMOL/L (ref 3–16)
AST SERPL-CCNC: 81 U/L (ref 8–33)
BASOPHILS ABSOLUTE: 0.1 K/UL (ref 0–0.1)
BASOPHILS RELATIVE PERCENT: 1 %
BILIRUB SERPL-MCNC: 0.7 MG/DL (ref 0.3–1.2)
BUN BLDV-MCNC: 19 MG/DL (ref 6–20)
CALCIUM SERPL-MCNC: 10.2 MG/DL (ref 8.5–10.5)
CHLORIDE BLD-SCNC: 100 MMOL/L (ref 98–107)
CHOLESTEROL, TOTAL: 274 MG/DL (ref 0–200)
CO2: 25 MMOL/L (ref 20–30)
CREAT SERPL-MCNC: 0.8 MG/DL (ref 0.4–1.2)
EOSINOPHILS ABSOLUTE: 0.2 K/UL (ref 0–0.4)
EOSINOPHILS RELATIVE PERCENT: 2.6 %
FOLATE: 14.69 NG/ML
GFR SERPL CREATININE-BSD FRML MDRD: >60 ML/MIN/{1.73_M2}
GLOBULIN: 2.3 G/DL
GLUCOSE BLD-MCNC: 95 MG/DL (ref 74–106)
HCT VFR BLD CALC: 44.6 % (ref 37–47)
HDLC SERPL-MCNC: 147 MG/DL (ref 40–60)
HEMOGLOBIN: 15 G/DL (ref 11.5–16.5)
IMMATURE GRANULOCYTES #: 0 K/UL
IMMATURE GRANULOCYTES %: 0.4 % (ref 0–5)
LDL CHOLESTEROL CALCULATED: 108 MG/DL
LYMPHOCYTES ABSOLUTE: 1.9 K/UL (ref 1.5–4)
LYMPHOCYTES RELATIVE PERCENT: 26.2 %
MCH RBC QN AUTO: 33.7 PG (ref 27–32)
MCHC RBC AUTO-ENTMCNC: 33.6 G/DL (ref 31–35)
MCV RBC AUTO: 100.2 FL (ref 80–100)
MONOCYTES ABSOLUTE: 0.7 K/UL (ref 0.2–0.8)
MONOCYTES RELATIVE PERCENT: 10.2 %
NEUTROPHILS ABSOLUTE: 4.4 K/UL (ref 2–7.5)
NEUTROPHILS RELATIVE PERCENT: 59.6 %
PDW BLD-RTO: 15.2 % (ref 11–16)
PLATELET # BLD: 133 K/UL (ref 150–400)
PMV BLD AUTO: 12.1 FL (ref 6–10)
POTASSIUM SERPL-SCNC: 3.7 MMOL/L (ref 3.4–5.1)
RBC # BLD: 4.45 M/UL (ref 3.8–5.8)
SODIUM BLD-SCNC: 142 MMOL/L (ref 136–145)
TOTAL PROTEIN: 7.3 G/DL (ref 6.4–8.3)
TRIGL SERPL-MCNC: 97 MG/DL (ref 0–249)
VITAMIN B-12: 972 PG/ML (ref 211–911)
VITAMIN D 25-HYDROXY: 23.9 (ref 32–100)
VLDLC SERPL CALC-MCNC: 19 MG/DL
WBC # BLD: 7.3 K/UL (ref 4–11)

## 2022-10-24 PROCEDURE — 82746 ASSAY OF FOLIC ACID SERUM: CPT

## 2022-10-24 PROCEDURE — 80061 LIPID PANEL: CPT

## 2022-10-24 PROCEDURE — 80053 COMPREHEN METABOLIC PANEL: CPT

## 2022-10-24 PROCEDURE — 82306 VITAMIN D 25 HYDROXY: CPT

## 2022-10-24 PROCEDURE — G0296 VISIT TO DETERM LDCT ELIG: HCPCS | Performed by: NURSE PRACTITIONER

## 2022-10-24 PROCEDURE — 82607 VITAMIN B-12: CPT

## 2022-10-24 PROCEDURE — 85025 COMPLETE CBC W/AUTO DIFF WBC: CPT

## 2022-10-24 PROCEDURE — G0438 PPPS, INITIAL VISIT: HCPCS | Performed by: NURSE PRACTITIONER

## 2022-10-24 RX ORDER — MONTELUKAST SODIUM 10 MG/1
TABLET ORAL
Qty: 30 TABLET | Refills: 5 | Status: SHIPPED | OUTPATIENT
Start: 2022-10-24

## 2022-10-24 RX ORDER — FLUTICASONE PROPIONATE 50 MCG
SPRAY, SUSPENSION (ML) NASAL
Qty: 1 EACH | Refills: 2 | Status: SHIPPED | OUTPATIENT
Start: 2022-10-24

## 2022-10-24 RX ORDER — LISINOPRIL 10 MG/1
TABLET ORAL
Qty: 30 TABLET | Refills: 0 | Status: SHIPPED | OUTPATIENT
Start: 2022-10-24 | End: 2022-11-16 | Stop reason: SDUPTHER

## 2022-10-24 SDOH — ECONOMIC STABILITY: FOOD INSECURITY: WITHIN THE PAST 12 MONTHS, YOU WORRIED THAT YOUR FOOD WOULD RUN OUT BEFORE YOU GOT MONEY TO BUY MORE.: NEVER TRUE

## 2022-10-24 SDOH — ECONOMIC STABILITY: FOOD INSECURITY: WITHIN THE PAST 12 MONTHS, THE FOOD YOU BOUGHT JUST DIDN'T LAST AND YOU DIDN'T HAVE MONEY TO GET MORE.: NEVER TRUE

## 2022-10-24 ASSESSMENT — PATIENT HEALTH QUESTIONNAIRE - PHQ9
SUM OF ALL RESPONSES TO PHQ9 QUESTIONS 1 & 2: 1
8. MOVING OR SPEAKING SO SLOWLY THAT OTHER PEOPLE COULD HAVE NOTICED. OR THE OPPOSITE, BEING SO FIGETY OR RESTLESS THAT YOU HAVE BEEN MOVING AROUND A LOT MORE THAN USUAL: 0
SUM OF ALL RESPONSES TO PHQ9 QUESTIONS 1 & 2: 1
9. THOUGHTS THAT YOU WOULD BE BETTER OFF DEAD, OR OF HURTING YOURSELF: 0
1. LITTLE INTEREST OR PLEASURE IN DOING THINGS: 0
2. FEELING DOWN, DEPRESSED OR HOPELESS: 0
3. TROUBLE FALLING OR STAYING ASLEEP: 1
SUM OF ALL RESPONSES TO PHQ QUESTIONS 1-9: 1
7. TROUBLE CONCENTRATING ON THINGS, SUCH AS READING THE NEWSPAPER OR WATCHING TELEVISION: 0
2. FEELING DOWN, DEPRESSED OR HOPELESS: 1
SUM OF ALL RESPONSES TO PHQ QUESTIONS 1-9: 1
4. FEELING TIRED OR HAVING LITTLE ENERGY: 0
10. IF YOU CHECKED OFF ANY PROBLEMS, HOW DIFFICULT HAVE THESE PROBLEMS MADE IT FOR YOU TO DO YOUR WORK, TAKE CARE OF THINGS AT HOME, OR GET ALONG WITH OTHER PEOPLE: 0
SUM OF ALL RESPONSES TO PHQ QUESTIONS 1-9: 1
2. FEELING DOWN, DEPRESSED OR HOPELESS: 1
SUM OF ALL RESPONSES TO PHQ QUESTIONS 1-9: 1
6. FEELING BAD ABOUT YOURSELF - OR THAT YOU ARE A FAILURE OR HAVE LET YOURSELF OR YOUR FAMILY DOWN: 0
SUM OF ALL RESPONSES TO PHQ QUESTIONS 1-9: 1
5. POOR APPETITE OR OVEREATING: 0
SUM OF ALL RESPONSES TO PHQ QUESTIONS 1-9: 1
SUM OF ALL RESPONSES TO PHQ QUESTIONS 1-9: 1
1. LITTLE INTEREST OR PLEASURE IN DOING THINGS: 0
SUM OF ALL RESPONSES TO PHQ QUESTIONS 1-9: 1
SUM OF ALL RESPONSES TO PHQ QUESTIONS 1-9: 1

## 2022-10-24 ASSESSMENT — SOCIAL DETERMINANTS OF HEALTH (SDOH): HOW HARD IS IT FOR YOU TO PAY FOR THE VERY BASICS LIKE FOOD, HOUSING, MEDICAL CARE, AND HEATING?: NOT HARD AT ALL

## 2022-10-24 ASSESSMENT — LIFESTYLE VARIABLES
HOW MANY STANDARD DRINKS CONTAINING ALCOHOL DO YOU HAVE ON A TYPICAL DAY: 1 OR 2
HOW OFTEN DO YOU HAVE A DRINK CONTAINING ALCOHOL: 2-3 TIMES A WEEK

## 2022-10-24 NOTE — PROGRESS NOTES
Chief Complaint   Patient presents with    Medicare AWV     Progesterone capsule      Have you seen any other physician or provider since your last visit no    Have you had any other diagnostic tests since your last visit? no    Have you changed or stopped any medications since your last visit? no     Medicare Annual Wellness Visit    [place] is here for Medicare AWV    Assessment & Plan   Encounter for screening mammogram for malignant neoplasm of breast  -     [facility] DIGITAL DIAGNOSTIC W OR WO CAD BILATERAL; Future  Essential hypertension  -     lisinopril (PRINIVIL;ZESTRIL) 10 MG tablet; TAKE ONE TABLET BY MOUTH DAILY AS DIRECTED, Disp-30 tablet, R-0Normal  Non-seasonal allergic rhinitis, unspecified trigger  -     montelukast (SINGULAIR) 10 MG tablet; Take one tablet by mouth once nightly, Disp-30 tablet, R-5Normal  -     fluticasone (FLONASE) 50 MCG/ACT nasal spray; SPRAY TWO SPRAYS IN EACH NOSTRIL ONCE DAILY, Disp-1 each, R-2Normal  Seasonal allergies  -     montelukast (SINGULAIR) 10 MG tablet; Take one tablet by mouth once nightly, Disp-30 tablet, R-5Normal  -     fluticasone (FLONASE) 50 MCG/ACT nasal spray; SPRAY TWO SPRAYS IN EACH NOSTRIL ONCE DAILY, Disp-1 each, [id number]  Folic acid deficiency  -     Multiple Vitamins-Minerals (MULTIVITAMIN ADULTS) TABS; Take 1 tab daily with food, Disp-90 tablet, R-1Normal  -     Vitamin B12 & Folate; Future  Other fatigue  -     Multiple Vitamins-Minerals (MULTIVITAMIN ADULTS) TABS; Take 1 tab daily with food, Disp-90 tablet, R-1Normal  Hypokalemia  -     Comprehensive Metabolic Panel; Future  Vitamin D deficiency  -     Vitamin D 25 Hydroxy; Future  Vitamin B 12 deficiency  -     Vitamin B12 & Folate; Future  Encounter for lipid screening for cardiovascular disease  -     Lipid, Fasting;  Future  Anemia due to folic acid deficiency, unspecified deficiency type  -     CBC with Auto Differential; Future  Colon cancer screening  Personal history of tobacco use  - AL VISIT TO DISCUSS LUNG CA SCREEN W LDCT  -     CT Lung Screen (Annual); Future  Initial Medicare annual wellness visit  Encounter for screening mammogram for breast cancer  -     KHANH Digital Screen Bilateral; Future    Recommendations for Preventive Services Due: see orders and patient instructions/AVS.  Recommended screening schedule for the next 5-10 years is provided to the patient in written form: see Patient Instructions/AVS.     Return for Medicare Annual Wellness Visit in 1 year. Subjective   The following acute and/or chronic problems were also addressed today:  HTN, folic acid deficiency, tobacco dependence, exercise counseling, dietary counseling, colon cancer screening education    Patient's complete Health Risk Assessment and screening values have been reviewed and are found in Flowsheets. The following problems were reviewed today and where indicated follow up appointments were made and/or referrals ordered.     Positive Risk Factor Screenings with Interventions:       Tobacco Use:  Tobacco Use: High Risk    Smoking Tobacco Use: Some Days    Smokeless Tobacco Use: Never    Passive Exposure: Not on file     E-cigarette/Vaping       Questions Responses    E-cigarette/Vaping Use     Start Date     Passive Exposure     Quit Date     Counseling Given     Comments           Substance Use - Tobacco Interventions:  patient agrees to start an exercise program to relieve stress and help avoid weight gain associated with tobacco cessation         General Health and ACP:  General  In general, how would you say your health is?: Very Good  In the past 7 days, have you experienced any of the following: New or Increased Pain, New or Increased Fatigue, Loneliness, Social Isolation, Stress or Anger?: (!) Yes  Select all that apply: (!) Stress  Do you get the social and emotional support that you need?: Yes  Do you have a Living Will?: Yes    Advance Directives       Power of  Living Will ACP-Advance Directive ACP-Power of     Not on File Not on File Not on File Not on File          General Health Risk Interventions:  Stress: patient's comments regarding reasons for stress and/or anger: 30 minutes, regular exercise recommended- 3-5 times per week, 30-45 minutes per session, relaxation techniques discussed, patient advised to follow-up in this office for further evaluation and treatment within 3 month(s)    Health Habits/Nutrition:  Physical Activity: Sufficiently Active    Days of Exercise per Week: 7 days    Minutes of Exercise per Session: 30 min     Have you lost any weight without trying in the past 3 months?: No  Body mass index: 24.54  Have you seen the dentist within the past year?: (!) No  Health Habits/Nutrition Interventions:  Patient should continue eating healthy and exercsising. She is here to get on track with     Hearing/Vision:  Do you or your family notice any trouble with your hearing that hasn't been managed with hearing aids?: No  Do you have difficulty driving, watching TV, or doing any of your daily activities because of your eyesight?: No  Have you had an eye exam within the past year?: (!) No  No results found.   Hearing/Vision Interventions:  none     ADLs:  In the past 7 days, did you need help from others to perform any of the following everyday activities: Eating, dressing, grooming, bathing, toileting, or walking/balance?: No  In the past 7 days, did you need help from others to take care of any of the following: Laundry, housekeeping, banking/finances, shopping, telephone use, food preparation, transportation, or taking medications?: (!) Yes  Select all that apply: (!) Transportation  ADL Interventions:  Patient declines any further evaluation/treatment for this issue          Objective   Vitals:    10/24/22 1117   BP: 122/80   Pulse: 89   Resp: 18   Temp: 98.2 °F (36.8 °C)   TempSrc: Infrared   SpO2: 97%   Weight: 143 lb (64.9 kg)   Height: 5' 4\" (1.626 m)      Body mass index is 24.55 kg/m². General Appearance: alert and oriented to person, place and time, well developed and well- nourished, in no acute distress  Skin: warm and dry, no rash or erythema  Head: normocephalic and atraumatic  Eyes: pupils equal, round, and reactive to light, extraocular eye movements intact, conjunctivae normal  ENT: tympanic membrane, external ear and ear canal normal bilaterally, nose without deformity, nasal mucosa and turbinates normal without polyps  Neck: supple and non-tender without mass, no thyromegaly or thyroid nodules, no cervical lymphadenopathy  Pulmonary/Chest: clear to auscultation bilaterally- no wheezes, rales or rhonchi, normal air movement, no respiratory distress  Cardiovascular: normal rate, regular rhythm, normal S1 and S2, no murmurs, rubs, clicks, or gallops, distal pulses intact, no carotid bruits  Abdomen: soft, non-tender, non-distended, normal bowel sounds, no masses or organomegaly  Extremities: no cyanosis, clubbing or edema  Musculoskeletal: normal range of motion, no joint swelling, deformity or tenderness  Neurologic: reflexes normal and symmetric, no cranial nerve deficit, gait, coordination and speech normal       No Known Allergies  Prior to Visit Medications    Medication Sig Taking?  Authorizing Provider   lisinopril (PRINIVIL;ZESTRIL) 10 MG tablet TAKE ONE TABLET BY MOUTH DAILY AS DIRECTED Yes SANTNIO Cruz CNP   montelukast (SINGULAIR) 10 MG tablet Take one tablet by mouth once nightly Yes SANTINO Cruz CNP   Multiple Vitamins-Minerals (MULTIVITAMIN ADULTS) TABS Take 1 tab daily with food Yes SANTINO Cruz CNP   fluticasone (FLONASE) 50 MCG/ACT nasal spray SPRAY TWO SPRAYS IN EACH NOSTRIL ONCE DAILY Yes SANTINO Cruz CNP   estradiol (CLIMARA) 0.025 MG/24HR APPLY ONE PATCH TO THE SKIN ONCE WEEKLY Yes SANTINO Cruz CNP   diclofenac sodium (VOLTAREN) 1 % GEL APPLY TWO GRAMS TOPICALLY TWICE A DAY Yes SANTINO Cruz CNP albuterol sulfate  (90 Base) MCG/ACT inhaler Inhale 2 puffs into the lungs every 6 hours as needed for Wheezing Yes SANTINO Tucker CNP   omeprazole (PRILOSEC) 40 MG delayed release capsule Take 1 capsule by mouth every morning (before breakfast) Yes SANTINO Tucker CNP   clonazePAM (KLONOPIN) 1 MG tablet Take 0.5 tablets by mouth nightly as needed for Anxiety (insomnia) for up to 30 days. Yes SANTINO Tucker CNP   nystatin (MYCOSTATIN) 149872 UNIT/GM cream Apply topically 2 times daily. Yes SANTINO Tucker CNP   progesterone (PROMETRIUM) 100 MG capsule TAKE ONE CAPSULE BY MOUTH DAILY Yes SANTINO Lilly CNP   diclofenac (VOLTAREN) 50 MG EC tablet Take 1 tablet by mouth 2 times daily  Patient not taking: Reported on 10/24/2022  SANTINO Lilly CNP       CareTeam (Including outside providers/suppliers regularly involved in providing care):   Patient Care Team:  SANTINO Tucker CNP as PCP - General (Nurse Practitioner)  SANTINO Tucker CNP as PCP - [facility] Empaneled Provider     Reviewed and updated this visit:  Tobacco  Allergies  Meds  Problems  Med Hx  Surg Hx  Soc Hx  Fam Hx                 Low Dose CT (LDCT) Lung Screening criteria met:     Age 50-77(Medicare) or 50-80 (USPST)   Pack year smoking >20   Still smoking or less than 15 year since quit   No sign or symptoms of lung cancer   > 11 months since last LDCT     Risks and benefits of lung cancer screening with LDCT scans discussed:    Significance of positive screen - False-positive LDCT results often occur. 95% of all positive results do not lead to a diagnosis of cancer. Usually further imaging can resolve most false-positive results; however, some patients may require invasive procedures. Over diagnosis risk - 10% to 12% of screen-detected lung cancer cases are over diagnosed--that is, the cancer would not have been detected in the patient's lifetime without the screening.     Need for follow up screens annually to continue lung cancer screening effectiveness     Risks associated with radiation from annual LDCT- Radiation exposure is about the same as for a mammogram, which is about 1/3 of the annual background radiation exposure from everyday life. Starting screening at age 54 is not likely to increase cancer risk from radiation exposure. Patients with comorbidities resulting in life expectancy of < 10 years, or that would preclude treatment of an abnormality identified on CT, should not be screened due to lack of benefit.     To obtain maximal benefit from this screening, smoking cessation and long-term abstinence from smoking is critical

## 2022-10-24 NOTE — PATIENT INSTRUCTIONS
Keep a list of your medicines with you. List all of the prescription medicines, nonprescription medicines, supplements, natural remedies, and vitamins that you take. Tell your healthcare providers who treat you about all of the products you are taking. Your provider can provide you with a form to keep track of them. Just ask. Follow the directions that come with your medicine, including information about food or alcohol. Make sure you know how and when to take your medicine. Do not take more or less than you are supposed to take. Keep all medicines out of the reach of children. Store medicines according to the directions on the label. Monitor yourself. Learn to know how your body reacts to your new medicine and keep track of how it makes you feel before attempting (If your provider has allowed you to do so) to drive or go to work. Seek emergency medical attention if you think you have used too much of this medicine. An overdose of any prescription medicine can be fatal. Overdose symptoms may include extreme drowsiness, muscle weakness, confusion, cold and clammy skin, pinpoint pupils, shallow breathing, slow heart rate, fainting, or coma. Don't share prescription medicines with others, even when they seem to have the same symptoms. What may be good for you may be harmful to others. If you are no longer taking a prescribed medication and you have pills left please take your pills out of their original containers. Mix crushed pills with an undesirable substance, such as cat litter or used coffee grounds. Put the mixture into a disposable container with a lid, such as an empty margarine tub, or into a sealable bag. Cover up or remove any of your personal information on the empty containers by covering it with black permanent marker or duct tape. Place the sealed container with the mixture, and the empty drug containers, in the trash.    If you use a medication that is in the form of a patch, dispose of used patches by folding them in half so that the sticky sides meet, and then flushing them down a toilet. They should not be placed in the household trash where children or pets can find them. If you have any questions, ask your provider or pharmacist for more information. Be sure to keep all appointments for provider visits or tests. We are committed to providing you with the best care possible. In order to help us achieve these goals please remember to bring all medications, herbal products, and over the counter supplements with you to each visit. If your provider has ordered testing for you, please be sure to follow up with our office if you have not received results within 7 days after the testing took place. *If you receive a survey after visiting one of our offices, please take time to share your experience concerning your physician office visit. These surveys are confidential and no health information about you is shared. We are eager to improve for you and we are counting on your feedback to help make that happen. Personalized Preventive Plan for Community Health 10/24/2022  Medicare offers a range of preventive health benefits. Some of the tests and screenings are paid in full while other may be subject to a deductible, co-insurance, and/or copay. Some of these benefits include a comprehensive review of your medical history including lifestyle, illnesses that may run in your family, and various assessments and screenings as appropriate. After reviewing your medical record and screening and assessments performed today your provider may have ordered immunizations, labs, imaging, and/or referrals for you. A list of these orders (if applicable) as well as your Preventive Care list are included within your After Visit Summary for your review.     Other Preventive Recommendations:    A preventive eye exam performed by an eye specialist is recommended every 1-2 years to screen for glaucoma; cataracts, macular degeneration, and other eye disorders. A preventive dental visit is recommended every 6 months. Try to get at least 150 minutes of exercise per week or 10,000 steps per day on a pedometer . Order or download the FREE \"Exercise & Physical Activity: Your Everyday Guide\" from [employer]. Call 4-770.907.5685 or search [employer] online. You need 2553-1144 mg of calcium and 6982-1805 IU of vitamin D per day. It is possible to meet your calcium requirement with diet alone, but a vitamin D supplement is usually necessary to meet this goal.  When exposed to the sun, use a sunscreen that protects against both UVA and UVB radiation with an SPF of 30 or greater. Reapply every 2 to 3 hours or after sweating, drying off with a towel, or swimming. Always wear a seat belt when traveling in a car. Always wear a helmet when riding a bicycle or motorcycle. What is lung cancer screening? Lung cancer screening is a way in which doctors check the lungs for early signs of cancer in people who have no symptoms of lung cancer. A low-dose CT scan uses much less radiation than a normal CT scan and shows a more detailed image of the lungs than a standard X-ray. The goal of lung cancer screening is to find cancer early, before it has a chance to grow, spread, or cause problems. One large study found that smokers who were screened with low-dose CT scans were less likely to die of lung cancer than those who were screened with standard X-ray. Below is a summary of the things you need to know regarding screening for lung cancer with low-dose computed tomography (LDCT). This is a screening program that involves routine annual screening with LDCT studies of the lung. The LDCTs are done using low-dose radiation that is not thought to increase your cancer risk.   If you have other serious medical conditions (other cancers, congestive heart failure) that limit your life

## 2022-10-27 DIAGNOSIS — E55.9 VITAMIN D DEFICIENCY: Primary | ICD-10-CM

## 2022-10-27 DIAGNOSIS — E78.2 MIXED HYPERLIPIDEMIA: Primary | ICD-10-CM

## 2022-10-27 RX ORDER — ATORVASTATIN CALCIUM 10 MG/1
10 TABLET, FILM COATED ORAL DAILY
Qty: 90 TABLET | Refills: 1 | Status: SHIPPED | OUTPATIENT
Start: 2022-10-27

## 2022-10-27 RX ORDER — ERGOCALCIFEROL 1.25 MG/1
50000 CAPSULE ORAL WEEKLY
Qty: 12 CAPSULE | Refills: 1 | Status: SHIPPED | OUTPATIENT
Start: 2022-10-27

## 2022-10-27 RX ORDER — MULTIVITAMIN
1 TABLET ORAL DAILY
COMMUNITY
Start: 2022-10-24 | End: 2022-11-16 | Stop reason: SDUPTHER

## 2022-11-07 NOTE — PATIENT INSTRUCTIONS
· Keep a list of your medicines with you. List all of the prescription medicines, nonprescription medicines, supplements, natural remedies, and vitamins that you take. Tell your healthcare providers who treat you about all of the products you are taking. Your provider can provide you with a form to keep track of them. Just ask. · Follow the directions that come with your medicine, including information about food or alcohol. Make sure you know how and when to take your medicine. Do not take more or less than you are supposed to take. · Keep all medicines out of the reach of children. · Store medicines according to the directions on the label. · Monitor yourself. Learn to know how your body reacts to your new medicine and keep track of how it makes you feel before attempting (If your provider has allowed you to do so) to drive or go to work. · Seek emergency medical attention if you think you have used too much of this medicine. An overdose of any prescription medicine can be fatal. Overdose symptoms may include extreme drowsiness, muscle weakness, confusion, cold and clammy skin, pinpoint pupils, shallow breathing, slow heart rate, fainting, or coma. · Don't share prescription medicines with others, even when they seem to have the same symptoms. What may be good for you may be harmful to others. · If you are no longer taking a prescribed medication and you have pills left please take your pills out of their original containers. Mix crushed pills with an undesirable substance, such as cat litter or used coffee grounds. Put the mixture into a disposable container with a lid, such as an empty margarine tub, or into a sealable bag. Cover up or remove any of your personal information on the empty containers by covering it with black permanent marker or duct tape. Place the sealed container with the mixture, and the empty drug containers, in the trash.    · If you use a medication that is in the form of a patch, dispose of used patches by folding them in half so that the sticky sides meet, and then flushing them down a toilet. They should not be placed in the household trash where children or pets can find them. · If you have any questions, ask your provider or pharmacist for more information. · Be sure to keep all appointments for provider visits or tests. We are committed to providing you with the best care possible. In order to help us achieve these goals please remember to bring all medications, herbal products, and over the counter supplements with you to each visit. If your provider has ordered testing for you, please be sure to follow up with our office if you have not received results within 7 days after the testing took place. *If you receive a survey after visiting one of our offices, please take time to share your experience concerning your physician office visit. These surveys are confidential and no health information about you is shared. We are eager to improve for you and we are counting on your feedback to help make that happen. ips to Help You Stop Smoking       Cigarette smoking is a preventable cause of death in the [facility]. If you have thought about quitting but haven't been able to, here are some reasons why you should and some ways to do it. Here's Why   Quitting smoking now can decrease your risk of getting smoking-related illnesses like:   Heart disease   Stroke   Several types of cancer, including:   Lung   Mouth   Esophagus   Larynx   Bladder   Pancreas   Kidney   Chronic lung diseases:   Bronchitis   Emphysema   Asthma   Cataracts   Macular degeneration   Thyroid conditions   Hearing loss   Erectile dysfunction   Dementia   Osteoporosis   Here's How   Once you've decided to quit smoking, set your target quit date a few weeks away.  In the time leading up to your quit day, try some of these ideas offered by the [address] [place] [address] [place] to help you successfully quit smoking. For the best results, work with your doctor. Together, you can test your lung function and compare the results to those of a nonsmoking person. The results can be given to you as your lung age. Finding out your lung age right after having the test done may help you to stop smoking. Your doctor can also discuss with you all of your options and refer you to smoking-cessation support groups. You may wish to use nicotine replacement (gum, patches, inhaler) or one of the prescription medications that have been shown to increase quit rates and prolong abstinence from smoking. But whatever you and your doctor decide on these matters, it will still be you who decides when an how to quit. Here are some techniques:   Switch Brands   Switch to a brand you find distasteful. Change to a brand that is low in tar and nicotine a couple of weeks before your target quit date. This will help change your smoking behavior. However, do not smoke more cigarettes, inhale them more often or more deeply, or place your fingertips over the holes in the filters. All of these actions will increase your nicotine intake, and the idea is to get your body used to functioning without nicotine. Cut Down the Number of Cigarettes You Smoke   Smoke only half of each cigarette. Each day, postpone the lighting of your first cigarette by one hour. Decide you'll only smoke during odd or even hours of the day. Decide beforehand how many cigarettes you'll smoke during the day. For each additional cigarette, give a dollar to your favorite frederic. Change your eating habits to help you cut down. For example, drink milk, which many people consider incompatible with smoking. End meals or snacks with something that won't lead to a cigarette. Reach for a glass of juice instead of a cigarette for a \"pick-me-up. \"   Remember: Cutting down can help you quit, but it's not a substitute for quitting.  If yourself or someone else. Estimate the cost in terms of packs of cigarettes, and put the money aside to buy these presents. Keep very busy on the big day. Go to the movies, exercise, take long walks, or go bike riding. Remind your family and friends that this is your quit date, and ask them to help you over the rough spots of the first couple of days and weeks. Buy yourself a treat or do something special to celebrate. Telephone and Internet Support   Telephone, web-, and computer-based programs can offer you the support that you need to quit and to stay smoke-free. You can find many programs online, like the [employer]'s [place] from Smoking . Immediately After Quitting   Develop a clean, fresh, nonsmoking environment around yourselfat work and at home. Buy yourself flowersyou may be surprised how much you can enjoy their scent now. The first few days after you quit, spend as much free time as possible in places where smoking isn't allowed, such as [address] [place] [address], museums, theaters, department stores, and churches. Drink large quantities of water and fruit juice (but avoid sodas that contain caffeine). Try to avoid alcohol, coffee, and other beverages that you associate with cigarette smoking. Strike up conversation instead of a match for a cigarette. If you miss the sensation of having a cigarette in your hand, play with something elsea pencil, a paper clip, a marble. If you miss having something in your mouth, try toothpicks or a fake cigarette. VTAMA Counseling: I discussed with the patient that VTAMA is not for use in the eyes, mouth or mouth. They should call the office if they develop any signs of allergic reactions to VTAMA. The patient verbalized understanding of the proper use and possible adverse effects of VTAMA.  All of the patient's questions and concerns were addressed.

## 2022-11-14 DIAGNOSIS — Z12.31 ENCOUNTER FOR SCREENING MAMMOGRAM FOR BREAST CANCER: ICD-10-CM

## 2022-11-16 ENCOUNTER — OFFICE VISIT (OUTPATIENT)
Dept: FAMILY MEDICINE CLINIC | Age: 57
End: 2022-11-16
Payer: MEDICAID

## 2022-11-16 VITALS
SYSTOLIC BLOOD PRESSURE: 110 MMHG | WEIGHT: 148.4 LBS | BODY MASS INDEX: 25.33 KG/M2 | RESPIRATION RATE: 18 BRPM | TEMPERATURE: 98.2 F | HEART RATE: 83 BPM | OXYGEN SATURATION: 96 % | DIASTOLIC BLOOD PRESSURE: 66 MMHG | HEIGHT: 64 IN

## 2022-11-16 DIAGNOSIS — G47.09 OTHER INSOMNIA: ICD-10-CM

## 2022-11-16 DIAGNOSIS — M62.838 MUSCLE SPASMS OF BOTH LOWER EXTREMITIES: ICD-10-CM

## 2022-11-16 DIAGNOSIS — Z78.0 MENOPAUSE: ICD-10-CM

## 2022-11-16 DIAGNOSIS — I10 ESSENTIAL HYPERTENSION: ICD-10-CM

## 2022-11-16 DIAGNOSIS — Z12.4 CERVICAL CANCER SCREENING: Primary | ICD-10-CM

## 2022-11-16 PROCEDURE — 99214 OFFICE O/P EST MOD 30 MIN: CPT | Performed by: NURSE PRACTITIONER

## 2022-11-16 PROCEDURE — 3078F DIAST BP <80 MM HG: CPT | Performed by: NURSE PRACTITIONER

## 2022-11-16 PROCEDURE — 3074F SYST BP LT 130 MM HG: CPT | Performed by: NURSE PRACTITIONER

## 2022-11-16 RX ORDER — LISINOPRIL 10 MG/1
TABLET ORAL
Qty: 30 TABLET | Refills: 0 | Status: SHIPPED | OUTPATIENT
Start: 2022-11-16 | End: 2022-11-16

## 2022-11-16 RX ORDER — CYCLOBENZAPRINE HCL 5 MG
5 TABLET ORAL 2 TIMES DAILY PRN
Qty: 30 TABLET | Refills: 0 | Status: SHIPPED | OUTPATIENT
Start: 2022-11-16 | End: 2022-12-16

## 2022-11-16 RX ORDER — LISINOPRIL 10 MG/1
TABLET ORAL
Qty: 30 TABLET | Refills: 3 | Status: SHIPPED | OUTPATIENT
Start: 2022-11-16

## 2022-11-16 RX ORDER — PROGESTERONE 100 MG/1
100 CAPSULE ORAL NIGHTLY
Qty: 30 CAPSULE | Refills: 2 | Status: SHIPPED | OUTPATIENT
Start: 2022-11-16 | End: 2023-02-14

## 2022-11-16 NOTE — PROGRESS NOTES
Wilner Nuno 65 y.o. presents today for   Chief Complaint   Patient presents with    Gynecologic Exam     Pap smear    Medication Refill        HPI:  Wilner Nuno states she is here today for her Pap smear. She just recently had her mammogram and has sent her Cologuard in recently as well. She has no real complaints today she says she has not been sexually active in over 4 years. She began going through menopause at an early age she believes in her early 45s and was experiencing severe hot flashes. A gynecologist who she cannot recall the name prescribed her progesterone 100 mg replacement tablets and she is taking those for approximately 10 years. She has tried to come off of them but admits she has major mood swings and is very grouchy and would prefer to continue taking those. 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 She has lost weight and is happy about that but says it is somewhat unintentional due to anxiety and poor appetite. She also has complaints of insomnia she is currently taking melatonin but says it does not seem to be very effective and if it does help her fall asleep she still wakes up frequently. She is interested in taking something different to help her sleep. This is been an issue for many years. Gynecologic Exam  She is not sexually active.  She is postmenopausal.   Medication Refill        Family History   Problem Relation Age of Onset    Cancer Mother         ovarian and liver    Stroke Mother     Cancer Father         liver    Cancer Paternal Grandmother         kidney    Breast Cancer Sister         Social History     Socioeconomic History    Marital status:      Spouse name: Not on file    Number of children: Not on file    Years of education: Not on file    Highest education level: Not on file   Occupational History    Not on file   Tobacco Use    Smoking status: Some Days     Packs/day: 1.00     Years: 20.00     Pack years: 20.00     Types: Cigarettes    Smokeless tobacco: Never   Substance and Sexual Activity    Alcohol use:  Yes     Alcohol/week: 4.0 standard drinks     Types: 2 Glasses of wine, 2 Cans of beer per week    Drug use: No    Sexual activity: Not on file   Other Topics Concern    Not on file   Social History Narrative    Not on file     Social Determinants of Health     Financial Resource Strain: Low Risk     Difficulty of Paying Living Expenses: Not hard at all   Food Insecurity: No Food Insecurity    Worried About Running Out of Food in the Last Year: Never true    Ran Out of Food in the Last Year: Never true   Transportation Needs: Not on file   Physical Activity: Sufficiently Active    Days of Exercise per Week: 7 days    Minutes of Exercise per Session: 30 min   Stress: Not on file   Social Connections: Not on file   Intimate Partner Violence: Not on file   Housing Stability: Not on file        Past Surgical History:   Procedure Laterality Date     SECTION      times 2    PATELLA SURGERY      plastic        Past Medical History:   Diagnosis Date    Allergic rhinitis     Hypertension         Current Outpatient Medications   Medication Sig Dispense Refill    progesterone (PROMETRIUM) 100 MG CAPS capsule Take 1 capsule by mouth nightly 30 capsule 2    cyclobenzaprine (FLEXERIL) 5 MG tablet Take 1 tablet by mouth 2 times daily as needed for Muscle spasms 30 tablet 0    doxyLAMINE succinate (GNP SLEEP AID) 25 MG tablet Take 1 tablet by mouth nightly 30 tablet 1    lisinopril (PRINIVIL;ZESTRIL) 10 MG tablet TAKE ONE TABLET BY MOUTH DAILY AS DIRECTED 30 tablet 3    atorvastatin (LIPITOR) 10 MG tablet Take 1 tablet by mouth daily 90 tablet 1    vitamin D (ERGOCALCIFEROL) 1.25 MG (96162 UT) CAPS capsule Take 1 capsule by mouth once a week 12 capsule 1    montelukast (SINGULAIR) 10 MG tablet Take one tablet by mouth once nightly 30 tablet 5    Multiple Vitamins-Minerals (MULTIVITAMIN ADULTS) TABS Take 1 tab daily with food 90 tablet 1    fluticasone (FLONASE) 50 MCG/ACT nasal spray SPRAY TWO SPRAYS IN EACH NOSTRIL ONCE DAILY 1 each 2    estradiol (CLIMARA) 0.025 MG/24HR APPLY ONE PATCH TO THE SKIN ONCE WEEKLY 4 patch 0    diclofenac sodium (VOLTAREN) 1 % GEL APPLY TWO GRAMS TOPICALLY TWICE A  g 2    nystatin (MYCOSTATIN) 905110 UNIT/GM cream Apply topically 2 times daily. 15 g 2    progesterone (PROMETRIUM) 100 MG capsule TAKE ONE CAPSULE BY MOUTH DAILY 30 capsule 5    albuterol sulfate  (90 Base) MCG/ACT inhaler Inhale 2 puffs into the lungs every 6 hours as needed for Wheezing (Patient not taking: Reported on 11/16/2022) 1 each 5    clonazePAM (KLONOPIN) 1 MG tablet Take 0.5 tablets by mouth nightly as needed for Anxiety (insomnia) for up to 30 days. 15 tablet 0     No current facility-administered medications for this visit. Review of Systems   Constitutional:  Positive for unexpected weight change. Psychiatric/Behavioral:  Positive for agitation and sleep disturbance. The patient is nervous/anxious. All other systems reviewed and are negative. /66   Pulse 83   Temp 98.2 °F (36.8 °C) (Infrared)   Resp 18   Ht 5' 4\" (1.626 m)   Wt 148 lb 6.4 oz (67.3 kg)   SpO2 96% Comment: ra  BMI 25.47 kg/m²      Physical Exam  Vitals and nursing note reviewed. Constitutional:       Appearance: Normal appearance. HENT:      Head: Normocephalic and atraumatic. Right Ear: Tympanic membrane, ear canal and external ear normal.      Left Ear: Tympanic membrane, ear canal and external ear normal.      Nose: Nose normal.      Mouth/Throat:      Mouth: Mucous membranes are moist.      Pharynx: Oropharynx is clear. Eyes:      Extraocular Movements: Extraocular movements intact. Conjunctiva/sclera: Conjunctivae normal.      Pupils: Pupils are equal, round, and reactive to light.    Cardiovascular:      Rate and Rhythm: Normal rate and regular rhythm. Pulses: Normal pulses. Heart sounds: Normal heart sounds. Pulmonary:      Effort: Pulmonary effort is normal.      Breath sounds: Normal breath sounds. Abdominal:      General: Bowel sounds are normal.      Palpations: Abdomen is soft. Genitourinary:     General: Normal vulva. Rectum: Normal.   Musculoskeletal:         General: Normal range of motion. Cervical back: Normal range of motion and neck supple. Skin:     General: Skin is warm and dry. Capillary Refill: Capillary refill takes less than 2 seconds. Neurological:      General: No focal deficit present. Mental Status: She is alert and oriented to person, place, and time. Psychiatric:         Mood and Affect: Mood normal.         Behavior: Behavior normal.        ASSESSMENT/PLAN    1. Essential hypertension  Advised patient to take medication as directed and to follow-up in 3 months. She is agreeable to plan of care. - lisinopril (PRINIVIL;ZESTRIL) 10 MG tablet; TAKE ONE TABLET BY MOUTH DAILY AS DIRECTED  Dispense: 30 tablet; Refill: 3    2. Menopause  Discussed today is some vaginal atrophy although she admits does not cause a problem for her. She would like to continue the progesterone so that was ordered and advised to take as directed. Will get a progesterone level at her next visit with other labs. She is agreeable to plan of care. - Progesterone; Future  - progesterone (PROMETRIUM) 100 MG CAPS capsule; Take 1 capsule by mouth nightly  Dispense: 30 capsule; Refill: 2    3. Muscle spasms of both lower extremities  Patient admits she often experiences muscle spasms and would like to have some Flexeril to use as needed. She does not need them often. - cyclobenzaprine (FLEXERIL) 5 MG tablet; Take 1 tablet by mouth 2 times daily as needed for Muscle spasms  Dispense: 30 tablet; Refill: 0    4.  Other insomnia  Encourage patient to take medication as directed and to avoid driving or operating heavy machinery if she does take this medication due to sedation. She can try breaking the tablet in half and taking 12.5 mg to see if that dose is effective and if it does not she can take the full tablet as needed. She is agreeable to plan of care. - doxyLAMINE succinate (GNP SLEEP AID) 25 MG tablet; Take 1 tablet by mouth nightly  Dispense: 30 tablet;  Refill: 1             SANTINO Tucker - CNP

## 2022-11-16 NOTE — PROGRESS NOTES
Chief Complaint   Patient presents with    Gynecologic Exam     Pap smear       Have you seen any other physician or provider since your last visit no    Have you had any other diagnostic tests since your last visit? yes - mammogram, in imaging tab    Have you changed or stopped any medications since your last visit?  yes - see med list

## 2022-11-28 DIAGNOSIS — H65.91 OME (OTITIS MEDIA WITH EFFUSION), RIGHT: Primary | ICD-10-CM

## 2022-11-28 RX ORDER — CEFDINIR 300 MG/1
300 CAPSULE ORAL 2 TIMES DAILY
Qty: 14 CAPSULE | Refills: 0 | Status: SHIPPED | OUTPATIENT
Start: 2022-11-28 | End: 2022-12-05

## 2022-11-28 RX ORDER — FLUCONAZOLE 150 MG/1
150 TABLET ORAL ONCE
Qty: 1 TABLET | Refills: 0 | Status: SHIPPED | OUTPATIENT
Start: 2022-11-28 | End: 2022-11-28

## 2022-12-21 DIAGNOSIS — J30.89 NON-SEASONAL ALLERGIC RHINITIS, UNSPECIFIED TRIGGER: ICD-10-CM

## 2022-12-22 DIAGNOSIS — I10 ESSENTIAL HYPERTENSION: ICD-10-CM

## 2022-12-22 DIAGNOSIS — J30.89 NON-SEASONAL ALLERGIC RHINITIS, UNSPECIFIED TRIGGER: ICD-10-CM

## 2022-12-22 DIAGNOSIS — E53.8 FOLIC ACID DEFICIENCY: ICD-10-CM

## 2022-12-22 DIAGNOSIS — R53.83 OTHER FATIGUE: ICD-10-CM

## 2022-12-22 RX ORDER — LISINOPRIL 10 MG/1
TABLET ORAL
Qty: 30 TABLET | Refills: 3 | Status: SHIPPED | OUTPATIENT
Start: 2022-12-22

## 2022-12-22 RX ORDER — ALBUTEROL SULFATE 90 UG/1
2 AEROSOL, METERED RESPIRATORY (INHALATION) EVERY 6 HOURS PRN
Qty: 1 EACH | Refills: 5 | Status: SHIPPED | OUTPATIENT
Start: 2022-12-22

## 2023-01-20 DIAGNOSIS — E55.9 VITAMIN D DEFICIENCY: ICD-10-CM

## 2023-01-20 RX ORDER — ERGOCALCIFEROL 1.25 MG/1
CAPSULE ORAL
Qty: 12 CAPSULE | Refills: 1 | Status: SHIPPED | OUTPATIENT
Start: 2023-01-20

## 2023-02-16 DIAGNOSIS — Z78.0 MENOPAUSE: ICD-10-CM

## 2023-02-16 RX ORDER — PROGESTERONE 100 MG/1
CAPSULE ORAL
Qty: 30 CAPSULE | Refills: 2 | Status: SHIPPED | OUTPATIENT
Start: 2023-02-16

## 2023-04-19 DIAGNOSIS — I10 ESSENTIAL HYPERTENSION: ICD-10-CM

## 2023-04-19 RX ORDER — LISINOPRIL 10 MG/1
TABLET ORAL
Qty: 30 TABLET | Refills: 3 | Status: SHIPPED | OUTPATIENT
Start: 2023-04-19

## 2023-04-21 DIAGNOSIS — F41.9 ANXIETY: ICD-10-CM

## 2023-04-21 DIAGNOSIS — E53.8 FOLIC ACID DEFICIENCY: ICD-10-CM

## 2023-04-21 DIAGNOSIS — I10 ESSENTIAL HYPERTENSION: ICD-10-CM

## 2023-04-21 DIAGNOSIS — J30.89 NON-SEASONAL ALLERGIC RHINITIS, UNSPECIFIED TRIGGER: ICD-10-CM

## 2023-04-21 DIAGNOSIS — J30.2 SEASONAL ALLERGIES: ICD-10-CM

## 2023-04-21 DIAGNOSIS — F32.A DEPRESSION, UNSPECIFIED DEPRESSION TYPE: ICD-10-CM

## 2023-04-21 DIAGNOSIS — R53.83 OTHER FATIGUE: ICD-10-CM

## 2023-04-21 DIAGNOSIS — G47.00 INSOMNIA, UNSPECIFIED TYPE: ICD-10-CM

## 2023-04-21 DIAGNOSIS — E78.2 MIXED HYPERLIPIDEMIA: ICD-10-CM

## 2023-04-21 DIAGNOSIS — E55.9 VITAMIN D DEFICIENCY: ICD-10-CM

## 2023-04-24 RX ORDER — ATORVASTATIN CALCIUM 10 MG/1
10 TABLET, FILM COATED ORAL DAILY
Qty: 90 TABLET | Refills: 1 | Status: SHIPPED | OUTPATIENT
Start: 2023-04-24

## 2023-04-24 RX ORDER — CLONAZEPAM 1 MG/1
0.5 TABLET ORAL NIGHTLY PRN
Qty: 15 TABLET | Refills: 0 | OUTPATIENT
Start: 2023-04-24 | End: 2023-05-24

## 2023-04-24 RX ORDER — ERGOCALCIFEROL 1.25 MG/1
50000 CAPSULE ORAL WEEKLY
Qty: 12 CAPSULE | Refills: 1 | Status: SHIPPED | OUTPATIENT
Start: 2023-04-24

## 2023-04-24 RX ORDER — FLUTICASONE PROPIONATE 50 MCG
SPRAY, SUSPENSION (ML) NASAL
Qty: 1 EACH | Refills: 2 | Status: SHIPPED | OUTPATIENT
Start: 2023-04-24

## 2023-04-24 RX ORDER — LISINOPRIL 10 MG/1
TABLET ORAL
Qty: 30 TABLET | Refills: 3 | OUTPATIENT
Start: 2023-04-24

## 2023-04-24 RX ORDER — MONTELUKAST SODIUM 10 MG/1
TABLET ORAL
Qty: 30 TABLET | Refills: 5 | Status: SHIPPED | OUTPATIENT
Start: 2023-04-24

## 2023-04-26 DIAGNOSIS — E78.2 MIXED HYPERLIPIDEMIA: ICD-10-CM

## 2023-04-26 RX ORDER — ATORVASTATIN CALCIUM 10 MG/1
TABLET, FILM COATED ORAL
Qty: 90 TABLET | Refills: 1 | OUTPATIENT
Start: 2023-04-26

## 2023-04-27 DIAGNOSIS — E53.8 FOLIC ACID DEFICIENCY: ICD-10-CM

## 2023-04-27 DIAGNOSIS — F41.9 ANXIETY: ICD-10-CM

## 2023-04-27 DIAGNOSIS — R53.83 OTHER FATIGUE: ICD-10-CM

## 2023-04-27 DIAGNOSIS — F32.A DEPRESSION, UNSPECIFIED DEPRESSION TYPE: ICD-10-CM

## 2023-04-27 DIAGNOSIS — G47.00 INSOMNIA, UNSPECIFIED TYPE: ICD-10-CM

## 2023-04-27 RX ORDER — CLONAZEPAM 1 MG/1
0.5 TABLET ORAL NIGHTLY PRN
Qty: 15 TABLET | Refills: 0 | OUTPATIENT
Start: 2023-04-27 | End: 2023-05-27

## 2023-05-14 DIAGNOSIS — Z78.0 MENOPAUSE: ICD-10-CM

## 2023-05-15 RX ORDER — PROGESTERONE 100 MG/1
CAPSULE ORAL
Qty: 90 CAPSULE | Refills: 1 | Status: SHIPPED | OUTPATIENT
Start: 2023-05-15

## 2023-05-18 DIAGNOSIS — F41.9 ANXIETY: ICD-10-CM

## 2023-05-18 DIAGNOSIS — F32.A DEPRESSION, UNSPECIFIED DEPRESSION TYPE: ICD-10-CM

## 2023-05-18 DIAGNOSIS — G47.00 INSOMNIA, UNSPECIFIED TYPE: ICD-10-CM

## 2023-05-18 RX ORDER — CLONAZEPAM 1 MG/1
0.5 TABLET ORAL NIGHTLY PRN
Qty: 15 TABLET | Refills: 0 | OUTPATIENT
Start: 2023-05-18 | End: 2023-06-17

## 2023-05-18 NOTE — TELEPHONE ENCOUNTER
Refill request received from pharmacy      Next Office Visit Date:  Future Appointments   Date Time Provider Jeaneth Inman   10/25/2023  9:30 AM SANTINO Gama - CNP [address] - Please review via [address]

## 2023-08-15 DIAGNOSIS — I10 ESSENTIAL HYPERTENSION: ICD-10-CM

## 2023-08-15 RX ORDER — LISINOPRIL 10 MG/1
TABLET ORAL
Qty: 30 TABLET | Refills: 2 | Status: SHIPPED | OUTPATIENT
Start: 2023-08-15

## 2023-09-11 DIAGNOSIS — J30.89 NON-SEASONAL ALLERGIC RHINITIS, UNSPECIFIED TRIGGER: ICD-10-CM

## 2023-09-11 RX ORDER — ALBUTEROL SULFATE 90 UG/1
2 AEROSOL, METERED RESPIRATORY (INHALATION) EVERY 6 HOURS PRN
Qty: 1 EACH | Refills: 5 | OUTPATIENT
Start: 2023-09-11

## 2023-09-12 DIAGNOSIS — J30.89 NON-SEASONAL ALLERGIC RHINITIS, UNSPECIFIED TRIGGER: ICD-10-CM

## 2023-09-12 RX ORDER — ALBUTEROL SULFATE 90 UG/1
2 AEROSOL, METERED RESPIRATORY (INHALATION) EVERY 6 HOURS PRN
Qty: 1 EACH | Refills: 5 | Status: SHIPPED | OUTPATIENT
Start: 2023-09-12

## 2023-10-18 DIAGNOSIS — E78.2 MIXED HYPERLIPIDEMIA: ICD-10-CM

## 2023-10-18 RX ORDER — ATORVASTATIN CALCIUM 10 MG/1
10 TABLET, FILM COATED ORAL DAILY
Qty: 90 TABLET | Refills: 1 | Status: SHIPPED | OUTPATIENT
Start: 2023-10-18

## 2023-10-18 NOTE — TELEPHONE ENCOUNTER
Patient called, requested refill.        Next Office Visit Date:  Future Appointments   Date Time Provider [address]   10/25/2023  9:30 AM Ramin Gutierers, APRN - [facility] [place] please review via PDMP

## 2023-10-30 DIAGNOSIS — J30.2 SEASONAL ALLERGIES: ICD-10-CM

## 2023-10-30 DIAGNOSIS — E55.9 VITAMIN D DEFICIENCY: ICD-10-CM

## 2023-10-30 DIAGNOSIS — J30.89 NON-SEASONAL ALLERGIC RHINITIS, UNSPECIFIED TRIGGER: ICD-10-CM

## 2023-10-30 DIAGNOSIS — Z78.0 MENOPAUSE: ICD-10-CM

## 2023-10-31 RX ORDER — MONTELUKAST SODIUM 10 MG/1
TABLET ORAL
Qty: 30 TABLET | Refills: 5 | OUTPATIENT
Start: 2023-10-31

## 2023-10-31 RX ORDER — PROGESTERONE 100 MG/1
CAPSULE ORAL
Qty: 90 CAPSULE | Refills: 1 | OUTPATIENT
Start: 2023-10-31

## 2023-10-31 RX ORDER — ERGOCALCIFEROL 1.25 MG/1
50000 CAPSULE ORAL WEEKLY
Qty: 12 CAPSULE | Refills: 1 | OUTPATIENT
Start: 2023-10-31

## 2023-10-31 NOTE — TELEPHONE ENCOUNTER
Patient called, requested refill. Next Office Visit Date:  No future appointments.     MICHAEL please review via [address]

## 2023-11-10 DIAGNOSIS — Z78.0 MENOPAUSE: ICD-10-CM

## 2023-11-10 RX ORDER — PROGESTERONE 100 MG/1
CAPSULE ORAL
Qty: 90 CAPSULE | Refills: 1 | OUTPATIENT
Start: 2023-11-10

## 2023-12-04 DIAGNOSIS — J30.89 NON-SEASONAL ALLERGIC RHINITIS, UNSPECIFIED TRIGGER: ICD-10-CM

## 2023-12-04 DIAGNOSIS — J30.2 SEASONAL ALLERGIES: ICD-10-CM

## 2023-12-04 RX ORDER — MONTELUKAST SODIUM 10 MG/1
TABLET ORAL
Qty: 30 TABLET | Refills: 5 | OUTPATIENT
Start: 2023-12-04

## 2023-12-10 DIAGNOSIS — I10 ESSENTIAL HYPERTENSION: ICD-10-CM

## 2023-12-11 RX ORDER — LISINOPRIL 10 MG/1
TABLET ORAL
Qty: 30 TABLET | Refills: 2 | Status: SHIPPED | OUTPATIENT
Start: 2023-12-11

## 2023-12-11 NOTE — TELEPHONE ENCOUNTER
Refill request received from pharmacy      Next Office Visit Date:  No future appointments.     MICHAEL - Please review via PDMP        Last Office Visit:    11/16/2022

## 2023-12-28 DIAGNOSIS — E55.9 VITAMIN D DEFICIENCY: ICD-10-CM

## 2023-12-28 RX ORDER — ERGOCALCIFEROL 1.25 MG/1
50000 CAPSULE ORAL WEEKLY
Qty: 12 CAPSULE | Refills: 1 | OUTPATIENT
Start: 2023-12-28

## 2024-04-18 DIAGNOSIS — E78.2 MIXED HYPERLIPIDEMIA: ICD-10-CM

## 2024-04-18 RX ORDER — ATORVASTATIN CALCIUM 10 MG/1
10 TABLET, FILM COATED ORAL DAILY
Qty: 90 TABLET | Refills: 1 | OUTPATIENT
Start: 2024-04-18

## 2024-05-28 ENCOUNTER — APPOINTMENT (OUTPATIENT)
Dept: GENERAL RADIOLOGY | Facility: HOSPITAL | Age: 59
End: 2024-05-28
Payer: MEDICAID

## 2024-05-28 ENCOUNTER — HOSPITAL ENCOUNTER (OUTPATIENT)
Facility: HOSPITAL | Age: 59
Setting detail: OBSERVATION
Discharge: HOME | End: 2024-05-31
Attending: STUDENT IN AN ORGANIZED HEALTH CARE EDUCATION/TRAINING PROGRAM | Admitting: FAMILY MEDICINE
Payer: MEDICAID

## 2024-05-28 DIAGNOSIS — E83.42 HYPOMAGNESEMIA: ICD-10-CM

## 2024-05-28 DIAGNOSIS — E87.20 LACTIC ACIDOSIS: ICD-10-CM

## 2024-05-28 DIAGNOSIS — L03.116 CELLULITIS OF LEFT FOOT: Primary | ICD-10-CM

## 2024-05-28 DIAGNOSIS — E87.6 HYPOKALEMIA: ICD-10-CM

## 2024-05-28 PROBLEM — A41.9 SEPSIS: Status: RESOLVED | Noted: 2024-05-28 | Resolved: 2024-05-28

## 2024-05-28 PROBLEM — A41.9 SEPSIS: Status: ACTIVE | Noted: 2024-05-28

## 2024-05-28 LAB
ALBUMIN SERPL-MCNC: 2.1 G/DL (ref 3.5–5.2)
ALBUMIN/GLOB SERPL: 0.8 G/DL
ALP SERPL-CCNC: 231 U/L (ref 39–117)
ALT SERPL W P-5'-P-CCNC: 9 U/L (ref 1–33)
ANION GAP SERPL CALCULATED.3IONS-SCNC: 13.2 MMOL/L (ref 5–15)
AST SERPL-CCNC: 38 U/L (ref 1–32)
BASOPHILS # BLD AUTO: 0.11 10*3/MM3 (ref 0–0.2)
BASOPHILS NFR BLD AUTO: 0.9 % (ref 0–1.5)
BILIRUB SERPL-MCNC: 0.2 MG/DL (ref 0–1.2)
BUN SERPL-MCNC: 15 MG/DL (ref 6–20)
BUN/CREAT SERPL: 20 (ref 7–25)
CALCIUM SPEC-SCNC: 7.5 MG/DL (ref 8.6–10.5)
CHLORIDE SERPL-SCNC: 98 MMOL/L (ref 98–107)
CO2 SERPL-SCNC: 27.8 MMOL/L (ref 22–29)
CREAT SERPL-MCNC: 0.75 MG/DL (ref 0.57–1)
CRP SERPL-MCNC: <0.3 MG/DL (ref 0–0.5)
D-LACTATE SERPL-SCNC: 4.7 MMOL/L (ref 0.5–2)
D-LACTATE SERPL-SCNC: 6.7 MMOL/L (ref 0.5–2)
DEPRECATED RDW RBC AUTO: 48.8 FL (ref 37–54)
EGFRCR SERPLBLD CKD-EPI 2021: 93 ML/MIN/1.73
EOSINOPHIL # BLD AUTO: 0.17 10*3/MM3 (ref 0–0.4)
EOSINOPHIL NFR BLD AUTO: 1.3 % (ref 0.3–6.2)
ERYTHROCYTE [DISTWIDTH] IN BLOOD BY AUTOMATED COUNT: 13.8 % (ref 12.3–15.4)
ERYTHROCYTE [SEDIMENTATION RATE] IN BLOOD: 11 MM/HR (ref 0–30)
ETHANOL BLD-MCNC: <10 MG/DL (ref 0–10)
ETHANOL UR QL: <0.01 %
GLOBULIN UR ELPH-MCNC: 2.7 GM/DL
GLUCOSE SERPL-MCNC: 94 MG/DL (ref 65–99)
HBA1C MFR BLD: 4.6 % (ref 4.8–5.6)
HCT VFR BLD AUTO: 29 % (ref 34–46.6)
HGB BLD-MCNC: 9.7 G/DL (ref 12–15.9)
IMM GRANULOCYTES # BLD AUTO: 0.07 10*3/MM3 (ref 0–0.05)
IMM GRANULOCYTES NFR BLD AUTO: 0.5 % (ref 0–0.5)
LYMPHOCYTES # BLD AUTO: 1.84 10*3/MM3 (ref 0.7–3.1)
LYMPHOCYTES NFR BLD AUTO: 14.3 % (ref 19.6–45.3)
MAGNESIUM SERPL-MCNC: 0.9 MG/DL (ref 1.6–2.6)
MCH RBC QN AUTO: 32.3 PG (ref 26.6–33)
MCHC RBC AUTO-ENTMCNC: 33.4 G/DL (ref 31.5–35.7)
MCV RBC AUTO: 96.7 FL (ref 79–97)
MONOCYTES # BLD AUTO: 0.78 10*3/MM3 (ref 0.1–0.9)
MONOCYTES NFR BLD AUTO: 6.1 % (ref 5–12)
NEUTROPHILS NFR BLD AUTO: 76.9 % (ref 42.7–76)
NEUTROPHILS NFR BLD AUTO: 9.89 10*3/MM3 (ref 1.7–7)
NRBC BLD AUTO-RTO: 0 /100 WBC (ref 0–0.2)
PHOSPHATE SERPL-MCNC: 3.1 MG/DL (ref 2.5–4.5)
PLATELET # BLD AUTO: 219 10*3/MM3 (ref 140–450)
PMV BLD AUTO: 10.9 FL (ref 6–12)
POTASSIUM SERPL-SCNC: 2.5 MMOL/L (ref 3.5–5.2)
PROCALCITONIN SERPL-MCNC: 0.26 NG/ML (ref 0–0.25)
PROT SERPL-MCNC: 4.8 G/DL (ref 6–8.5)
RBC # BLD AUTO: 3 10*6/MM3 (ref 3.77–5.28)
SODIUM SERPL-SCNC: 139 MMOL/L (ref 136–145)
TSH SERPL DL<=0.05 MIU/L-ACNC: 2.45 UIU/ML (ref 0.27–4.2)
WBC NRBC COR # BLD AUTO: 12.86 10*3/MM3 (ref 3.4–10.8)

## 2024-05-28 PROCEDURE — 84100 ASSAY OF PHOSPHORUS: CPT

## 2024-05-28 PROCEDURE — G0378 HOSPITAL OBSERVATION PER HR: HCPCS

## 2024-05-28 PROCEDURE — 83036 HEMOGLOBIN GLYCOSYLATED A1C: CPT | Performed by: FAMILY MEDICINE

## 2024-05-28 PROCEDURE — 83605 ASSAY OF LACTIC ACID: CPT

## 2024-05-28 PROCEDURE — 25010000002 CEFTRIAXONE PER 250 MG

## 2024-05-28 PROCEDURE — 99285 EMERGENCY DEPT VISIT HI MDM: CPT

## 2024-05-28 PROCEDURE — 99223 1ST HOSP IP/OBS HIGH 75: CPT | Performed by: FAMILY MEDICINE

## 2024-05-28 PROCEDURE — 96368 THER/DIAG CONCURRENT INF: CPT

## 2024-05-28 PROCEDURE — 87205 SMEAR GRAM STAIN: CPT | Performed by: FAMILY MEDICINE

## 2024-05-28 PROCEDURE — 99291 CRITICAL CARE FIRST HOUR: CPT

## 2024-05-28 PROCEDURE — 36415 COLL VENOUS BLD VENIPUNCTURE: CPT

## 2024-05-28 PROCEDURE — 84443 ASSAY THYROID STIM HORMONE: CPT | Performed by: FAMILY MEDICINE

## 2024-05-28 PROCEDURE — 25010000002 POTASSIUM CHLORIDE 10 MEQ/100ML SOLUTION

## 2024-05-28 PROCEDURE — 25810000003 SODIUM CHLORIDE 0.9 % SOLUTION

## 2024-05-28 PROCEDURE — 96365 THER/PROPH/DIAG IV INF INIT: CPT

## 2024-05-28 PROCEDURE — 93005 ELECTROCARDIOGRAM TRACING: CPT

## 2024-05-28 PROCEDURE — 25010000002 MAGNESIUM SULFATE 2 GM/50ML SOLUTION

## 2024-05-28 PROCEDURE — 87040 BLOOD CULTURE FOR BACTERIA: CPT | Performed by: STUDENT IN AN ORGANIZED HEALTH CARE EDUCATION/TRAINING PROGRAM

## 2024-05-28 PROCEDURE — 83735 ASSAY OF MAGNESIUM: CPT

## 2024-05-28 PROCEDURE — 85652 RBC SED RATE AUTOMATED: CPT

## 2024-05-28 PROCEDURE — 96367 TX/PROPH/DG ADDL SEQ IV INF: CPT

## 2024-05-28 PROCEDURE — 82077 ASSAY SPEC XCP UR&BREATH IA: CPT

## 2024-05-28 PROCEDURE — 25010000002 PIPERACILLIN SOD-TAZOBACTAM PER 1 G

## 2024-05-28 PROCEDURE — 80053 COMPREHEN METABOLIC PANEL: CPT

## 2024-05-28 PROCEDURE — 96366 THER/PROPH/DIAG IV INF ADDON: CPT

## 2024-05-28 PROCEDURE — 85025 COMPLETE CBC W/AUTO DIFF WBC: CPT

## 2024-05-28 PROCEDURE — 87070 CULTURE OTHR SPECIMN AEROBIC: CPT | Performed by: FAMILY MEDICINE

## 2024-05-28 PROCEDURE — 25010000002 VANCOMYCIN 5 G RECONSTITUTED SOLUTION: Performed by: FAMILY MEDICINE

## 2024-05-28 PROCEDURE — 73630 X-RAY EXAM OF FOOT: CPT

## 2024-05-28 PROCEDURE — 25810000003 SODIUM CHLORIDE 0.9 % SOLUTION: Performed by: FAMILY MEDICINE

## 2024-05-28 PROCEDURE — 84145 PROCALCITONIN (PCT): CPT

## 2024-05-28 RX ORDER — MAGNESIUM SULFATE HEPTAHYDRATE 40 MG/ML
2 INJECTION, SOLUTION INTRAVENOUS ONCE
Status: COMPLETED | OUTPATIENT
Start: 2024-05-28 | End: 2024-05-29

## 2024-05-28 RX ORDER — ATORVASTATIN CALCIUM 10 MG/1
10 TABLET, FILM COATED ORAL DAILY
Status: DISCONTINUED | OUTPATIENT
Start: 2024-05-29 | End: 2024-05-31 | Stop reason: HOSPADM

## 2024-05-28 RX ORDER — AMOXICILLIN 250 MG
2 CAPSULE ORAL 2 TIMES DAILY PRN
Status: DISCONTINUED | OUTPATIENT
Start: 2024-05-28 | End: 2024-05-31 | Stop reason: HOSPADM

## 2024-05-28 RX ORDER — FLUTICASONE PROPIONATE 50 MCG
2 SPRAY, SUSPENSION (ML) NASAL DAILY
COMMUNITY

## 2024-05-28 RX ORDER — POTASSIUM CHLORIDE 7.45 MG/ML
10 INJECTION INTRAVENOUS ONCE
Status: COMPLETED | OUTPATIENT
Start: 2024-05-28 | End: 2024-05-29

## 2024-05-28 RX ORDER — POTASSIUM CHLORIDE 750 MG/1
40 CAPSULE, EXTENDED RELEASE ORAL ONCE
Status: COMPLETED | OUTPATIENT
Start: 2024-05-28 | End: 2024-05-28

## 2024-05-28 RX ORDER — DIPHENHYDRAMINE HCL 25 MG
25 CAPSULE ORAL NIGHTLY PRN
Status: DISCONTINUED | OUTPATIENT
Start: 2024-05-28 | End: 2024-05-31 | Stop reason: HOSPADM

## 2024-05-28 RX ORDER — SODIUM CHLORIDE 0.9 % (FLUSH) 0.9 %
10 SYRINGE (ML) INJECTION AS NEEDED
Status: DISCONTINUED | OUTPATIENT
Start: 2024-05-28 | End: 2024-05-31 | Stop reason: HOSPADM

## 2024-05-28 RX ORDER — SODIUM CHLORIDE 0.9 % (FLUSH) 0.9 %
10 SYRINGE (ML) INJECTION EVERY 12 HOURS SCHEDULED
Status: DISCONTINUED | OUTPATIENT
Start: 2024-05-28 | End: 2024-05-31 | Stop reason: HOSPADM

## 2024-05-28 RX ORDER — ONDANSETRON 4 MG/1
4 TABLET, ORALLY DISINTEGRATING ORAL EVERY 6 HOURS PRN
Status: DISCONTINUED | OUTPATIENT
Start: 2024-05-28 | End: 2024-05-31 | Stop reason: HOSPADM

## 2024-05-28 RX ORDER — MONTELUKAST SODIUM 10 MG/1
10 TABLET ORAL NIGHTLY
Status: DISCONTINUED | OUTPATIENT
Start: 2024-05-28 | End: 2024-05-31 | Stop reason: HOSPADM

## 2024-05-28 RX ORDER — BISACODYL 5 MG/1
5 TABLET, DELAYED RELEASE ORAL DAILY PRN
Status: DISCONTINUED | OUTPATIENT
Start: 2024-05-28 | End: 2024-05-31 | Stop reason: HOSPADM

## 2024-05-28 RX ORDER — PSEUDOEPHEDRINE HCL 120 MG/1
120 TABLET, FILM COATED, EXTENDED RELEASE ORAL EVERY 12 HOURS
COMMUNITY
End: 2024-05-31 | Stop reason: HOSPADM

## 2024-05-28 RX ORDER — ALUMINA, MAGNESIA, AND SIMETHICONE 2400; 2400; 240 MG/30ML; MG/30ML; MG/30ML
15 SUSPENSION ORAL EVERY 6 HOURS PRN
Status: DISCONTINUED | OUTPATIENT
Start: 2024-05-28 | End: 2024-05-31 | Stop reason: HOSPADM

## 2024-05-28 RX ORDER — ACETAMINOPHEN 160 MG/5ML
650 SOLUTION ORAL EVERY 4 HOURS PRN
Status: DISCONTINUED | OUTPATIENT
Start: 2024-05-28 | End: 2024-05-31 | Stop reason: HOSPADM

## 2024-05-28 RX ORDER — FLUTICASONE PROPIONATE 50 MCG
2 SPRAY, SUSPENSION (ML) NASAL DAILY
Status: DISCONTINUED | OUTPATIENT
Start: 2024-05-29 | End: 2024-05-31 | Stop reason: HOSPADM

## 2024-05-28 RX ORDER — ACETAMINOPHEN 650 MG/1
650 SUPPOSITORY RECTAL EVERY 4 HOURS PRN
Status: DISCONTINUED | OUTPATIENT
Start: 2024-05-28 | End: 2024-05-31 | Stop reason: HOSPADM

## 2024-05-28 RX ORDER — ACETAMINOPHEN 325 MG/1
650 TABLET ORAL EVERY 4 HOURS PRN
Status: DISCONTINUED | OUTPATIENT
Start: 2024-05-28 | End: 2024-05-31 | Stop reason: HOSPADM

## 2024-05-28 RX ORDER — VANCOMYCIN/0.9 % SOD CHLORIDE 1.5G/250ML
1500 PLASTIC BAG, INJECTION (ML) INTRAVENOUS EVERY 24 HOURS
Status: DISCONTINUED | OUTPATIENT
Start: 2024-05-29 | End: 2024-05-29

## 2024-05-28 RX ORDER — BISACODYL 10 MG
10 SUPPOSITORY, RECTAL RECTAL DAILY PRN
Status: DISCONTINUED | OUTPATIENT
Start: 2024-05-28 | End: 2024-05-31 | Stop reason: HOSPADM

## 2024-05-28 RX ORDER — CYCLOBENZAPRINE HCL 5 MG
5 TABLET ORAL 3 TIMES DAILY PRN
COMMUNITY

## 2024-05-28 RX ORDER — ENOXAPARIN SODIUM 100 MG/ML
40 INJECTION SUBCUTANEOUS EVERY 24 HOURS
Status: DISCONTINUED | OUTPATIENT
Start: 2024-05-28 | End: 2024-05-31 | Stop reason: HOSPADM

## 2024-05-28 RX ORDER — ONDANSETRON 2 MG/ML
4 INJECTION INTRAMUSCULAR; INTRAVENOUS EVERY 6 HOURS PRN
Status: DISCONTINUED | OUTPATIENT
Start: 2024-05-28 | End: 2024-05-31 | Stop reason: HOSPADM

## 2024-05-28 RX ORDER — SODIUM CHLORIDE 9 MG/ML
40 INJECTION, SOLUTION INTRAVENOUS AS NEEDED
Status: DISCONTINUED | OUTPATIENT
Start: 2024-05-28 | End: 2024-05-31 | Stop reason: HOSPADM

## 2024-05-28 RX ORDER — NITROGLYCERIN 0.4 MG/1
0.4 TABLET SUBLINGUAL
Status: DISCONTINUED | OUTPATIENT
Start: 2024-05-28 | End: 2024-05-31 | Stop reason: HOSPADM

## 2024-05-28 RX ORDER — CYCLOBENZAPRINE HCL 10 MG
5 TABLET ORAL 3 TIMES DAILY PRN
Status: DISCONTINUED | OUTPATIENT
Start: 2024-05-28 | End: 2024-05-31 | Stop reason: HOSPADM

## 2024-05-28 RX ORDER — POLYETHYLENE GLYCOL 3350 17 G/17G
17 POWDER, FOR SOLUTION ORAL DAILY PRN
Status: DISCONTINUED | OUTPATIENT
Start: 2024-05-28 | End: 2024-05-31 | Stop reason: HOSPADM

## 2024-05-28 RX ADMIN — POTASSIUM CHLORIDE 40 MEQ: 750 CAPSULE, EXTENDED RELEASE ORAL at 18:39

## 2024-05-28 RX ADMIN — SODIUM CHLORIDE 1000 ML: 9 INJECTION, SOLUTION INTRAVENOUS at 16:45

## 2024-05-28 RX ADMIN — MONTELUKAST 10 MG: 10 TABLET, FILM COATED ORAL at 21:36

## 2024-05-28 RX ADMIN — SODIUM CHLORIDE 2000 MG: 9 INJECTION, SOLUTION INTRAVENOUS at 16:48

## 2024-05-28 RX ADMIN — POTASSIUM CHLORIDE 10 MEQ: 7.46 INJECTION, SOLUTION INTRAVENOUS at 18:39

## 2024-05-28 RX ADMIN — VANCOMYCIN HYDROCHLORIDE 1250 MG: 5 INJECTION, POWDER, LYOPHILIZED, FOR SOLUTION INTRAVENOUS at 21:36

## 2024-05-28 RX ADMIN — Medication 10 ML: at 21:37

## 2024-05-28 RX ADMIN — PIPERACILLIN SODIUM AND TAZOBACTAM SODIUM 3.38 G: 3; .375 INJECTION, POWDER, LYOPHILIZED, FOR SOLUTION INTRAVENOUS at 19:14

## 2024-05-28 RX ADMIN — MAGNESIUM SULFATE HEPTAHYDRATE 2 G: 40 INJECTION, SOLUTION INTRAVENOUS at 18:52

## 2024-05-28 NOTE — ED PROVIDER NOTES
EMERGENCY DEPARTMENT ENCOUNTER    Pt Name: Nicole Silva  MRN: [id number]  Pt :   1965  Room Number:  23SF/23  Date of encounter:  2024  PCP: Aleksandra Andres APRN  ED Provider: Ricki Giraldo PA-C    Historian: Patient, nursing notes      HPI:  Chief Complaint: Wound on left ankle        Context: Nicole Silva is a 58 y.o. female who presents to the ED c/o a wound on her left ankle for the past 3 weeks.  Patient states she had a laceration on her left ankle 3 weeks ago that then became infected.  She did take a 1 week course of antibiotics which she completed 2 weeks ago but the wound has remained unhealed.  Patient reports drainage of green pus from the wound.  Patient denies any fever or chills, leg swelling, numbness or tingling in the lower extremity, shaking, tremors, or any other complaint today.      PAST MEDICAL HISTORY  Past Medical History:   Diagnosis Date    COPD (chronic obstructive pulmonary disease)     Hypertension          PAST SURGICAL HISTORY  Past Surgical History:   Procedure Laterality Date     SECTION  ,         FAMILY HISTORY  Family History   Problem Relation Age of Onset    Ovarian cancer Mother     Breast cancer Neg Hx          SOCIAL HISTORY  Social History     Socioeconomic History    Marital status:    Tobacco Use    Smoking status: Former     Types: Cigarettes     Passive exposure: Past    Smokeless tobacco: Never   Vaping Use    Vaping status: Never Used   Substance and Sexual Activity    Alcohol use: No    Drug use: No    Sexual activity: Yes     Partners: Male     Birth control/protection: Post-menopausal         ALLERGIES  Patient has no known allergies.        REVIEW OF SYSTEMS  Review of Systems   Constitutional:  Negative for chills and fever.   HENT:  Negative for congestion and sore throat.    Respiratory:  Negative for cough and shortness of breath.    Cardiovascular:  Negative for chest pain.   Gastrointestinal:  Negative for  abdominal pain, nausea and vomiting.   Genitourinary:  Negative for dysuria.   Musculoskeletal:  Negative for back pain.   Skin:  Positive for wound.   Neurological:  Negative for dizziness and headaches.   Psychiatric/Behavioral:  Negative for confusion.    All other systems reviewed and are negative.         All systems reviewed and negative except for those discussed in HPI.       PHYSICAL EXAM    I have reviewed the triage vital signs and nursing notes.    ED Triage Vitals [05/28/24 1452]   Temp Heart Rate Resp BP SpO2   98.9 °F (37.2 °C) 114 18 102/68 98 %      Temp src Heart Rate Source Patient Position BP Location FiO2 (%)   Oral Monitor Sitting Left arm --       Physical Exam  Vitals and nursing note reviewed.   Constitutional:       General: She is not in acute distress.     Appearance: She is not ill-appearing, toxic-appearing or diaphoretic.   HENT:      Head: Normocephalic and atraumatic.      Mouth/Throat:      Mouth: Mucous membranes are moist.      Pharynx: Oropharynx is clear.   Eyes:      Extraocular Movements: Extraocular movements intact.   Cardiovascular:      Rate and Rhythm: Normal rate.      Heart sounds: Normal heart sounds.   Pulmonary:      Effort: Pulmonary effort is normal. No respiratory distress.      Breath sounds: Normal breath sounds.   Abdominal:      Tenderness: There is no abdominal tenderness.   Skin:     General: Skin is warm and dry.      Findings: Wound present. No rash.          Neurological:      Mental Status: She is alert.             LAB RESULTS  Recent Results (from the past 24 hour(s))   Lactic Acid, Plasma    Collection Time: 05/28/24  4:45 PM    Specimen: Blood   Result Value Ref Range    Lactate 6.7 (C) 0.5 - 2.0 mmol/L   Procalcitonin    Collection Time: 05/28/24  4:45 PM    Specimen: Blood   Result Value Ref Range    Procalcitonin 0.26 (H) 0.00 - 0.25 ng/mL   C-reactive Protein    Collection Time: 05/28/24  4:45 PM    Specimen: Blood   Result Value Ref Range     C-Reactive Protein <0.30 0.00 - 0.50 mg/dL   CBC Auto Differential    Collection Time: 05/28/24  4:45 PM    Specimen: Blood   Result Value Ref Range    WBC 12.86 (H) 3.40 - 10.80 10*3/mm3    RBC 3.00 (L) 3.77 - 5.28 10*6/mm3    Hemoglobin 9.7 (L) 12.0 - 15.9 g/dL    Hematocrit 29.0 (L) 34.0 - 46.6 %    MCV 96.7 79.0 - 97.0 fL    MCH 32.3 26.6 - 33.0 pg    MCHC 33.4 31.5 - 35.7 g/dL    RDW 13.8 12.3 - 15.4 %    RDW-SD 48.8 37.0 - 54.0 fl    MPV 10.9 6.0 - 12.0 fL    Platelets 219 140 - 450 10*3/mm3    Neutrophil % 76.9 (H) 42.7 - 76.0 %    Lymphocyte % 14.3 (L) 19.6 - 45.3 %    Monocyte % 6.1 5.0 - 12.0 %    Eosinophil % 1.3 0.3 - 6.2 %    Basophil % 0.9 0.0 - 1.5 %    Immature Grans % 0.5 0.0 - 0.5 %    Neutrophils, Absolute 9.89 (H) 1.70 - 7.00 10*3/mm3    Lymphocytes, Absolute 1.84 0.70 - 3.10 10*3/mm3    Monocytes, Absolute 0.78 0.10 - 0.90 10*3/mm3    Eosinophils, Absolute 0.17 0.00 - 0.40 10*3/mm3    Basophils, Absolute 0.11 0.00 - 0.20 10*3/mm3    Immature Grans, Absolute 0.07 (H) 0.00 - 0.05 10*3/mm3    nRBC 0.0 0.0 - 0.2 /100 WBC   Sedimentation Rate    Collection Time: 05/28/24  4:45 PM    Specimen: Blood   Result Value Ref Range    Sed Rate 11 0 - 30 mm/hr   Comprehensive Metabolic Panel    Collection Time: 05/28/24  5:43 PM    Specimen: Blood   Result Value Ref Range    Glucose 94 65 - 99 mg/dL    BUN 15 6 - 20 mg/dL    Creatinine 0.75 0.57 - 1.00 mg/dL    Sodium 139 136 - 145 mmol/L    Potassium 2.5 (C) 3.5 - 5.2 mmol/L    Chloride 98 98 - 107 mmol/L    CO2 27.8 22.0 - 29.0 mmol/L    Calcium 7.5 (L) 8.6 - 10.5 mg/dL    Total Protein 4.8 (L) 6.0 - 8.5 g/dL    Albumin 2.1 (L) 3.5 - 5.2 g/dL    ALT (SGPT) 9 1 - 33 U/L    AST (SGOT) 38 (H) 1 - 32 U/L    Alkaline Phosphatase 231 (H) 39 - 117 U/L    Total Bilirubin 0.2 0.0 - 1.2 mg/dL    Globulin 2.7 gm/dL    A/G Ratio 0.8 g/dL    BUN/Creatinine Ratio 20.0 7.0 - 25.0    Anion Gap 13.2 5.0 - 15.0 mmol/L    eGFR 93.0 >60.0 mL/min/1.73   Magnesium    Collection  Time: 05/28/24  5:43 PM    Specimen: Blood   Result Value Ref Range    Magnesium 0.9 (C) 1.6 - 2.6 mg/dL       If labs were ordered, I independently reviewed the results and considered them in treating the patient.        RADIOLOGY  XR Foot 3+ View Left    Result Date: 5/28/2024  PROCEDURE: XR FOOT 3+ VW LEFT-  HISTORY: large wound on lateral left ankle, concern for osteomyelitis  COMPARISON: None.  FINDINGS:  A three view exam demonstrates no acute fracture or dislocation. The joint spaces appear unremarkable. No radiopaque foreign body identified in the soft tissues. There appears to be mild soft tissue swelling adjacent to the lateral malleolus.      No acute bony abnormality.      This report was signed and finalized on 5/28/2024 4:47 PM by Amna Segovia MD.       I ordered and independently reviewed the above noted radiographic studies.      I viewed images of left ankle x-ray which showed no evidence of osteomyelitis per my independent interpretation.    See radiologist's dictation for official interpretation.        PROCEDURES    Critical Care    Performed by: Ricki Giraldo PA-C  Authorized by: Og Richards DO    Critical care provider statement:     Critical care time (minutes):  45    Critical care was necessary to treat or prevent imminent or life-threatening deterioration of the following conditions:  Sepsis (Hypokalemia and hypomagnesemia)    Critical care was time spent personally by me on the following activities:  Development of treatment plan with patient or surrogate, discussions with consultants, evaluation of patient's response to treatment, examination of patient, interpretation of cardiac output measurements, obtaining history from patient or surrogate, review of old charts, re-evaluation of patient's condition, pulse oximetry, ordering and review of radiographic studies, ordering and review of laboratory studies and ordering and performing treatments and interventions    Care discussed  with: admitting provider        ECG 12 Lead Electrolyte Imbalance    (Results Pending)       MEDICATIONS GIVEN IN ER    Medications   Potassium Replacement - Follow Nurse / BPA Driven Protocol (has no administration in time range)   potassium chloride (MICRO-K/KLOR-CON) CR capsule (has no administration in time range)   potassium chloride 10 mEq in 100 mL IVPB (has no administration in time range)   Potassium Replacement - Follow Nurse / BPA Driven Protocol (has no administration in time range)   magnesium sulfate 2g/50 mL (PREMIX) infusion (has no administration in time range)   cefTRIAXone (ROCEPHIN) 2,000 mg in sodium chloride 0.9 % 100 mL IVPB-VTB (0 mg Intravenous Stopped 5/28/24 1718)   sodium chloride 0.9 % bolus 1,000 mL (0 mL Intravenous Stopped 5/28/24 1715)         MEDICAL DECISION MAKING, PROGRESS, and CONSULTS    All labs, if obtained, have been independently reviewed by me.  All radiology studies, if obtained, have been reviewed by me and the radiologist dictating the report.  All EKG's, if obtained, have been independently viewed and interpreted by me/my attending physician.      Discussion below represents my analysis of pertinent findings related to patient's condition, differential diagnosis, treatment plan and final disposition.    58-year-old female with a wound to the left foot for the past month.  She has failed outpatient antibiotics which she completed several weeks ago.  4 to 5 cm wound noted with some greenish granulation tissue on the left lateral foot noted.  Patient was tachycardic on arrival with a rate of 114 normotensive, temperature 98.9 °F, SpO2 98%.  She is upright alert and oriented in no acute distress.    X-ray of the left foot ordered to rule out osteomyelitis per radiology report there was no no evidence of bony abnormalities.  Due to obvious source of infection and tachycardia sepsis workup was initiated.  CBC showed a white blood cell count over 12,000.  Patient given 1 L  normal saline as she was having some bilateral peripheral edema and there was concern for volume overload.  IV Rocephin started after blood cultures drawn.     Initial lactic acid was 6.7 confirming some concern for sepsis.  Hypokalemia noted with a potassium of 2.5.  Hypomagnesemia noted with a magnesium of 0.9.  Electrolyte repletion protocols were ordered.  EKG obtained.  Discussed patient's care with ED attending Dr. Richards.  Discussed patient's care with hospital medicine Dr. Alatorre.  Discussed patient's care and dosing instructions with hospital pharmacist.  Patient will be [facility]                     Differential diagnosis:    Differential diagnosis included but was not limited to osteomyelitis, cellulitis, wound infection, among others      Additional sources:    - Discussed/ obtained information from independent historians: None    - External (non-ED) record review: Recent emergency department visit records    - Chronic or social conditions impacting care: None    Orders placed during this visit:  Orders Placed This Encounter   Procedures    Critical Care    Blood Culture With NEMO - Blood,    Blood Culture With NEMO - Blood,    XR Foot 3+ View Left    Comprehensive Metabolic Panel    Lactic Acid, Plasma    Procalcitonin    C-reactive Protein    Magnesium    CBC Auto Differential    Sedimentation Rate    STAT Lactic Acid, Reflex    Phosphorus    ECG 12 Lead Electrolyte Imbalance    CBC & Differential         Additional orders considered but not ordered: None      ED Course:    Consultants: ED attending Dr. Richards, hospital pharmacist, hospital medicine Dr. Alatorre    ED Course as of 05/28/24 1833 Tue May 28, 2024   1701 I have interviewed and examined the patient FACE-TO-FACE.  I reviewed the mid-level provider(s) note and agree with the clinical impression, plan, and disposition unless otherwise stated in the MDM below.    ATTENDING ATTESTATION  HPI: 58-year-old  female presents to the ER with chief complaint of wound check.  Patient reports that she cut her left anterior foot 1 month ago on a table at her brother's house.  Since then the wound has not healed correctly.  She has seen someone in the outpatient setting who has been treating with oral antibiotics with no relief.  No other associated symptoms including fever, chills, redness, numbness or weakness in the extremity.    EXAM:   General: Alert.  Nontoxic appearance.  No acute distress.  Head: Normocephalic.  Atraumatic.  Eyes: No scleral icterus.  Respiratory: Nonlabored breathing.  GI: Abdomen is nondistended.  Neurologic: Oriented x 3.  No focal deficits.  MSK: No bony tenderness to the left lower extremity.  Skin: Opened, superficial wound to the left anterior proximal foot.  No surrounding erythema.  No edema.  No pallor.  No cyanosis.     [JS]   1714 WBC(!): 12.86  White blood cell count 12.86 noted [TG]   1717 XR Foot 3+ View Left  Radiology overread confirms no evidence of osteomyelitis no acute bony abnormalities on x-ray of the left foot [TG]   1831 Potassium(!!): 2.5  Potassium 2.5 noted [TG]   1831 Procalcitonin(!)  Procalcitonin elevated 0.26 [TG]   1831 Lactic Acid, Plasma(!!)  Lactic acid 6.7 noted [TG]   1831 Magnesium(!!)  Magnesium 0.9 noted [TG]   1832 Alkaline Phosphatase(!): 231  Alk phos elevated at 231 [TG]      ED Course User Index  [JS] Og Richards DO  [TG] Ricki Giraldo PA-C              Shared Decision Making:  After my consideration of clinical presentation and any laboratory/radiology studies obtained, I discussed the findings with the patient/patient representative who is in agreement with the treatment plan and the final disposition.   Risks and benefits of discharge and/or observation/admission were discussed.       AS OF 18:33 EDT VITALS:    BP - 102/68  HR - 114  TEMP - 98.9 °F (37.2 °C) (Oral)  O2 SATS - 98%                  DIAGNOSIS  Final diagnoses:   Cellulitis of left  foot   Hypokalemia   Lactic acidosis   Hypomagnesemia         DISPOSITION  Admit      Please note that portions of this document were completed with voice recognition software.      Ricki Giraldo PA-C  05/28/24 4513

## 2024-05-28 NOTE — H&P
[facility]   HISTORY AND PHYSICAL      Name:  Nicole Silva   Age:  58 y.o.  Sex:  female  :  1965  MRN:  [id number]   Visit Number:  [id number]  Admission Date:  2024  Date Of Service:  24  Primary Care Physician:  Aleksandra Andres APRN    Chief Complaint:     Left leg wound    History Of Presenting Illness:      58-year-old with a an apparent history of hypertension, COPD, who presented to the emergency room with complaint of a wound on her left foot and ankle.  Patient states that about a month ago she was at her brother's house when she tripped over a dog, ended up with a laceration to the left foot/ankle.  She did not seek emergency care at the time.  She noted that she ended up going to the emergency room about a week later after it was not healing up and was placed on antibiotic that she completed about 2 weeks ago.  She was also given mupirocin ointment, and instructed on wound care with Betadine.  She notes it does not appear to be improving.  Otherwise she admits to some swelling of her lower extremities over the last 3 to 4 months.  She has not seen a primary care provider in a while as her  most recent 1 has left the practice.  She has lost some weight since last year.  Denies any alcohol or drug use.    In the ER she was overall hemodynamically stable.  She was noted to have hypokalemia, hypocalcemia, hypomagnesemia, hypoalbuminemia, elevated lactic acid, procalcitonin 0.26, and a white count of 12,000 hemoglobin 9.7.  X-ray of the left foot did not show any bony abnormality.  Due to concern for infection of the left foot we were asked to admit.    Review Of Systems:    All systems were reviewed and negative except as mentioned in history of presenting illness, assessment and plan.    Past Medical History: Patient  has a past medical history of COPD (chronic obstructive pulmonary disease) and Hypertension.    Past Surgical History: Patient  has a past  surgical history that includes  section (,).    Social History: Patient  reports that she has quit smoking. Her smoking use included cigarettes. She has been exposed to tobacco smoke. She has never used smokeless tobacco. She reports that she does not drink alcohol and does not use drugs.    Family History:  Patient's family history has been reviewed and found to be noncontributory.     Allergies:      Patient has no known allergies.    Home Medications:    Prior to Admission Medications       Prescriptions Last Dose Informant Patient Reported? Taking?    albuterol 108 (90 Base) MCG/ACT inhaler   Yes No    Inhale.    atorvastatin (LIPITOR) 10 MG tablet   Yes No    Take 1 tablet by mouth Daily.    fluticasone (FLOVENT HFA) 110 MCG/ACT inhaler   No No    Inhale 1 puff twice daily.  Rinse mouth well after using.    levocetirizine (XYZAL) 5 MG tablet   No No    Take 1 tablet by mouth Every Evening for 30 days.    lisinopril (PRINIVIL,ZESTRIL) 10 MG tablet   Yes No    Take 1 tablet by mouth Daily.    methylPREDNISolone (MEDROL) 4 MG dose pack   No No    Take as directed on package instructions.    montelukast (SINGULAIR) 10 MG tablet   No No    Take 1 tablet by mouth Every Night.    Multivitamin tablet tablet   Yes No    progesterone (PROMETRIUM) 100 MG capsule   No No    TAKE ONE CAPSULE BY MOUTH DAILY    Progesterone (PROMETRIUM) 100 MG capsule   Yes No    Take 1 capsule by mouth Every Night.    venlafaxine XR (EFFEXOR-XR) 75 MG 24 hr capsule   Yes No    Take 150 mg by mouth.    vitamin D (ERGOCALCIFEROL) 1.25 MG (38278 UT) capsule capsule   Yes No          ED Medications:    Medications   Potassium Replacement - Follow Nurse / BPA Driven Protocol (has no administration in time range)   potassium chloride 10 mEq in 100 mL IVPB (10 mEq Intravenous New Bag 24 2600)   Potassium Replacement - Follow Nurse / BPA Driven Protocol (has no administration in time range)   magnesium sulfate 2g/50 mL (PREMIX)  "infusion (2 g Intravenous New Bag 5/28/24 1852)   vancomycin 1250 mg/250 mL 0.9% NS IVPB ([facility]) (has no administration in time range)   piperacillin-tazobactam (ZOSYN) IVPB 3.375 g IVPB in 100 mL NS (VTB) (3.375 g Intravenous New Bag 5/28/24 1914)   cefTRIAXone (ROCEPHIN) 2,000 mg in sodium chloride 0.9 % 100 mL IVPB-VTB (0 mg Intravenous Stopped 5/28/24 1718)   sodium chloride 0.9 % bolus 1,000 mL (0 mL Intravenous Stopped 5/28/24 1715)   potassium chloride (MICRO-K/KLOR-CON) CR capsule (40 mEq Oral Given 5/28/24 1839)     Vital Signs:  Temp:  [98.9 °F (37.2 °C)] 98.9 °F (37.2 °C)  Heart Rate:  [105-114] 105  Resp:  [18] 18  BP: ()/(56-68) 92/56        05/28/24  1452   Weight: 58.1 kg (128 lb)     Body mass index is 21.97 kg/m².    Physical Exam:     Most recent vital Signs: BP 92/56 (BP Location: Left arm, Patient Position: Sitting)   Pulse 105   Temp 98.9 °F (37.2 °C) (Oral)   Resp 18   Ht 162.6 cm (64\")   Wt 58.1 kg (128 lb)   SpO2 93%   BMI 21.97 kg/m²     Physical Exam  Constitutional:       General: She is not in acute distress.     Appearance: She is not toxic-appearing.   HENT:      Mouth/Throat:      Mouth: Mucous membranes are moist.      Pharynx: Oropharynx is clear.   Eyes:      Extraocular Movements: Extraocular movements intact.      Pupils: Pupils are equal, round, and reactive to light.   Cardiovascular:      Rate and Rhythm: Normal rate and regular rhythm.      Pulses: Normal pulses.      Heart sounds: No murmur heard.     No gallop.   Pulmonary:      Effort: Pulmonary effort is normal. No respiratory distress.      Breath sounds: No wheezing or rales.   Abdominal:      General: Abdomen is flat. Bowel sounds are normal. There is no distension.      Tenderness: There is no abdominal tenderness.   Musculoskeletal:      Right lower leg: Edema present.      Left lower leg: Edema present.   Skin:     General: Skin is dry.      Findings: Lesion (Left anterior foot with laceration, granulation " "tissue noted) present.   Neurological:      General: No focal deficit present.      Mental Status: She is alert. Mental status is at baseline.   Psychiatric:         Mood and Affect: Mood normal.         Thought Content: Thought content normal.         Laboratory data:    I have reviewed the labs done in the emergency room.    Results from last 7 days   Lab Units 05/28/24  1743   SODIUM mmol/L 139   POTASSIUM mmol/L 2.5*   CHLORIDE mmol/L 98   CO2 mmol/L 27.8   BUN mg/dL 15   CREATININE mg/dL 0.75   CALCIUM mg/dL 7.5*   BILIRUBIN mg/dL 0.2   ALK PHOS U/L 231*   ALT (SGPT) U/L 9   AST (SGOT) U/L 38*   GLUCOSE mg/dL 94     Results from last 7 days   Lab Units 05/28/24  1645   WBC 10*3/mm3 12.86*   HEMOGLOBIN g/dL 9.7*   HEMATOCRIT % 29.0*   PLATELETS 10*3/mm3 219                                   Invalid input(s): \"USDES\", \"NITRITITE\", \"BACT\", \"EP\"    Pain Management Panel           No data to display                EKG:          Radiology:    XR Foot 3+ View Left    Result Date: 5/28/2024  PROCEDURE: XR FOOT 3+ VW LEFT-  HISTORY: large wound on lateral left ankle, concern for osteomyelitis  COMPARISON: None.  FINDINGS:  A three view exam demonstrates no acute fracture or dislocation. The joint spaces appear unremarkable. No radiopaque foreign body identified in the soft tissues. There appears to be mild soft tissue swelling adjacent to the lateral malleolus.      No acute bony abnormality.      This report was signed and finalized on 5/28/2024 4:47 PM by Amna Segovia MD.       Assessment:    Nonhealing left foot laceration/cellulitis, POA  Hypokalemia  Hypomagnesemia  Lactic acidosis  Hypoalbuminemia  Lower extremity edema    Plan:    Left foot wound:  Will attempt to get a culture if any drainage.  Otherwise will need local wound care.  Was given empiric antibiotics with vancomycin and Rocephin for now.  Unsure what antibiotic she was on outpatient previously.  Discussed with Dr. Boland who would see in " consult.    Hypokalemia/hypomagnesemia:  Will be replaced.  No recent labs to compare.  Patient did admit to decreased oral intake.    Addendum: Patient did admit to diarrhea for the last month or so, will send GI panel.    Hypoalbuminemia/edema:  Unsure chronicity, nutrition consult, will add lower extremity Dopplers.  She may need further workup if has ongoing weight loss.  Urinalysis to check for protein as well.    Anticipate less than 2 midnight stay further recommendations are predicated upon improvement in clinical condition.    Risk Assessment: Moderate  DVT Prophylaxis: Lovenox  Code Status: Full  Diet: As tolerated    Advance Care Planning   ACP discussion was held with the patient during this visit. Patient does not have an advance directive, declines further assistance.           Pat Alatorre DO  05/28/24  19:24 EDT    Dictated utilizing Dragon dictation.

## 2024-05-29 ENCOUNTER — APPOINTMENT (OUTPATIENT)
Dept: ULTRASOUND IMAGING | Facility: HOSPITAL | Age: 59
End: 2024-05-29
Payer: MEDICAID

## 2024-05-29 LAB
ADV 40+41 DNA STL QL NAA+NON-PROBE: NOT DETECTED
ALBUMIN SERPL-MCNC: 2.5 G/DL (ref 3.5–5.2)
ALBUMIN/GLOB SERPL: 1.1 G/DL
ALP SERPL-CCNC: 238 U/L (ref 39–117)
ALT SERPL W P-5'-P-CCNC: 9 U/L (ref 1–33)
AMPHET+METHAMPHET UR QL: NEGATIVE
AMPHETAMINES UR QL: NEGATIVE
ANION GAP SERPL CALCULATED.3IONS-SCNC: 13.3 MMOL/L (ref 5–15)
AST SERPL-CCNC: 50 U/L (ref 1–32)
ASTRO TYP 1-8 RNA STL QL NAA+NON-PROBE: NOT DETECTED
BACTERIA UR QL AUTO: ABNORMAL /HPF
BARBITURATES UR QL SCN: NEGATIVE
BENZODIAZ UR QL SCN: NEGATIVE
BILIRUB SERPL-MCNC: 0.4 MG/DL (ref 0–1.2)
BILIRUB UR QL STRIP: NEGATIVE
BUN SERPL-MCNC: 13 MG/DL (ref 6–20)
BUN/CREAT SERPL: 17.6 (ref 7–25)
BUPRENORPHINE SERPL-MCNC: NEGATIVE NG/ML
C CAYETANENSIS DNA STL QL NAA+NON-PROBE: NOT DETECTED
C COLI+JEJ+UPSA DNA STL QL NAA+NON-PROBE: NOT DETECTED
CALCIUM SPEC-SCNC: 7 MG/DL (ref 8.6–10.5)
CANNABINOIDS SERPL QL: NEGATIVE
CHLORIDE SERPL-SCNC: 100 MMOL/L (ref 98–107)
CLARITY UR: ABNORMAL
CO2 SERPL-SCNC: 24.7 MMOL/L (ref 22–29)
COCAINE UR QL: NEGATIVE
COLOR UR: YELLOW
CREAT SERPL-MCNC: 0.74 MG/DL (ref 0.57–1)
CRYPTOSP DNA STL QL NAA+NON-PROBE: NOT DETECTED
D-LACTATE SERPL-SCNC: 4 MMOL/L (ref 0.5–2)
D-LACTATE SERPL-SCNC: 4 MMOL/L (ref 0.5–2)
D-LACTATE SERPL-SCNC: 7.9 MMOL/L (ref 0.5–2)
E HISTOLYT DNA STL QL NAA+NON-PROBE: NOT DETECTED
EAEC PAA PLAS AGGR+AATA ST NAA+NON-PRB: NOT DETECTED
EC STX1+STX2 GENES STL QL NAA+NON-PROBE: NOT DETECTED
EGFRCR SERPLBLD CKD-EPI 2021: 94.5 ML/MIN/1.73
EPEC EAE GENE STL QL NAA+NON-PROBE: NOT DETECTED
ETEC LTA+ST1A+ST1B TOX ST NAA+NON-PROBE: NOT DETECTED
FENTANYL UR-MCNC: NEGATIVE NG/ML
G LAMBLIA DNA STL QL NAA+NON-PROBE: NOT DETECTED
GLOBULIN UR ELPH-MCNC: 2.2 GM/DL
GLUCOSE SERPL-MCNC: 93 MG/DL (ref 65–99)
GLUCOSE UR STRIP-MCNC: NEGATIVE MG/DL
HAV IGM SERPL QL IA: NORMAL
HBV CORE IGM SERPL QL IA: NORMAL
HBV SURFACE AG SERPL QL IA: NORMAL
HCT VFR BLD AUTO: 24.7 % (ref 34–46.6)
HCV AB SER QL: NORMAL
HGB BLD-MCNC: 8.1 G/DL (ref 12–15.9)
HGB UR QL STRIP.AUTO: ABNORMAL
HYALINE CASTS UR QL AUTO: ABNORMAL /LPF
KETONES UR QL STRIP: NEGATIVE
LEUKOCYTE ESTERASE UR QL STRIP.AUTO: ABNORMAL
MAGNESIUM SERPL-MCNC: 1.2 MG/DL (ref 1.6–2.6)
METHADONE UR QL SCN: NEGATIVE
MRSA DNA SPEC QL NAA+PROBE: NORMAL
NITRITE UR QL STRIP: NEGATIVE
NOROVIRUS GI+II RNA STL QL NAA+NON-PROBE: DETECTED
OPIATES UR QL: NEGATIVE
OXYCODONE UR QL SCN: NEGATIVE
P SHIGELLOIDES DNA STL QL NAA+NON-PROBE: NOT DETECTED
PCP UR QL SCN: NEGATIVE
PH UR STRIP.AUTO: 5.5 [PH] (ref 5–8)
POTASSIUM SERPL-SCNC: 2.5 MMOL/L (ref 3.5–5.2)
POTASSIUM SERPL-SCNC: 3.4 MMOL/L (ref 3.5–5.2)
PROT SERPL-MCNC: 4.7 G/DL (ref 6–8.5)
PROT UR QL STRIP: ABNORMAL
RBC # UR STRIP: ABNORMAL /HPF
REF LAB TEST METHOD: ABNORMAL
RVA RNA STL QL NAA+NON-PROBE: NOT DETECTED
S ENT+BONG DNA STL QL NAA+NON-PROBE: NOT DETECTED
SAPO I+II+IV+V RNA STL QL NAA+NON-PROBE: NOT DETECTED
SHIGELLA SP+EIEC IPAH ST NAA+NON-PROBE: NOT DETECTED
SODIUM SERPL-SCNC: 138 MMOL/L (ref 136–145)
SP GR UR STRIP: 1.02 (ref 1–1.03)
SQUAMOUS #/AREA URNS HPF: ABNORMAL /HPF
TRICYCLICS UR QL SCN: NEGATIVE
UROBILINOGEN UR QL STRIP: ABNORMAL
V CHOL+PARA+VUL DNA STL QL NAA+NON-PROBE: NOT DETECTED
V CHOLERAE DNA STL QL NAA+NON-PROBE: NOT DETECTED
WBC # UR STRIP: ABNORMAL /HPF
Y ENTEROCOL DNA STL QL NAA+NON-PROBE: NOT DETECTED

## 2024-05-29 PROCEDURE — 81001 URINALYSIS AUTO W/SCOPE: CPT | Performed by: FAMILY MEDICINE

## 2024-05-29 PROCEDURE — 83605 ASSAY OF LACTIC ACID: CPT

## 2024-05-29 PROCEDURE — P9047 ALBUMIN (HUMAN), 25%, 50ML: HCPCS | Performed by: FAMILY MEDICINE

## 2024-05-29 PROCEDURE — 83735 ASSAY OF MAGNESIUM: CPT | Performed by: FAMILY MEDICINE

## 2024-05-29 PROCEDURE — 25010000002 MAGNESIUM SULFATE 2 GM/50ML SOLUTION: Performed by: FAMILY MEDICINE

## 2024-05-29 PROCEDURE — 85014 HEMATOCRIT: CPT | Performed by: FAMILY MEDICINE

## 2024-05-29 PROCEDURE — 87086 URINE CULTURE/COLONY COUNT: CPT | Performed by: FAMILY MEDICINE

## 2024-05-29 PROCEDURE — 25010000002 ALBUMIN HUMAN 25% PER 50 ML: Performed by: FAMILY MEDICINE

## 2024-05-29 PROCEDURE — 87641 MR-STAPH DNA AMP PROBE: CPT | Performed by: FAMILY MEDICINE

## 2024-05-29 PROCEDURE — 99232 SBSQ HOSP IP/OBS MODERATE 35: CPT | Performed by: NURSE PRACTITIONER

## 2024-05-29 PROCEDURE — G0378 HOSPITAL OBSERVATION PER HR: HCPCS

## 2024-05-29 PROCEDURE — 96366 THER/PROPH/DIAG IV INF ADDON: CPT

## 2024-05-29 PROCEDURE — 84132 ASSAY OF SERUM POTASSIUM: CPT | Performed by: FAMILY MEDICINE

## 2024-05-29 PROCEDURE — 80307 DRUG TEST PRSMV CHEM ANLYZR: CPT | Performed by: FAMILY MEDICINE

## 2024-05-29 PROCEDURE — 93970 EXTREMITY STUDY: CPT

## 2024-05-29 PROCEDURE — 25810000003 SODIUM CHLORIDE 0.9 % SOLUTION: Performed by: FAMILY MEDICINE

## 2024-05-29 PROCEDURE — 80074 ACUTE HEPATITIS PANEL: CPT | Performed by: FAMILY MEDICINE

## 2024-05-29 PROCEDURE — 80053 COMPREHEN METABOLIC PANEL: CPT | Performed by: FAMILY MEDICINE

## 2024-05-29 PROCEDURE — 85018 HEMOGLOBIN: CPT | Performed by: FAMILY MEDICINE

## 2024-05-29 PROCEDURE — 25010000002 VANCOMYCIN 5 G RECONSTITUTED SOLUTION: Performed by: FAMILY MEDICINE

## 2024-05-29 PROCEDURE — 25010000002 CEFTRIAXONE PER 250 MG: Performed by: FAMILY MEDICINE

## 2024-05-29 PROCEDURE — 96361 HYDRATE IV INFUSION ADD-ON: CPT

## 2024-05-29 PROCEDURE — 96367 TX/PROPH/DG ADDL SEQ IV INF: CPT

## 2024-05-29 PROCEDURE — 87507 IADNA-DNA/RNA PROBE TQ 12-25: CPT | Performed by: FAMILY MEDICINE

## 2024-05-29 RX ORDER — MIDODRINE HYDROCHLORIDE 5 MG/1
5 TABLET ORAL ONCE
Status: COMPLETED | OUTPATIENT
Start: 2024-05-29 | End: 2024-05-29

## 2024-05-29 RX ORDER — SODIUM CHLORIDE 9 MG/ML
100 INJECTION, SOLUTION INTRAVENOUS CONTINUOUS
Status: DISCONTINUED | OUTPATIENT
Start: 2024-05-29 | End: 2024-05-31 | Stop reason: HOSPADM

## 2024-05-29 RX ORDER — ALBUTEROL SULFATE 90 UG/1
2 AEROSOL, METERED RESPIRATORY (INHALATION) EVERY 4 HOURS PRN
Status: DISCONTINUED | OUTPATIENT
Start: 2024-05-29 | End: 2024-05-31 | Stop reason: HOSPADM

## 2024-05-29 RX ORDER — POTASSIUM CHLORIDE 20 MEQ/1
40 TABLET, EXTENDED RELEASE ORAL EVERY 4 HOURS
Status: COMPLETED | OUTPATIENT
Start: 2024-05-29 | End: 2024-05-29

## 2024-05-29 RX ORDER — VIT E ACET/GLY/DIMETH/WATER
LOTION (ML) TOPICAL DAILY
Status: DISCONTINUED | OUTPATIENT
Start: 2024-05-29 | End: 2024-05-31 | Stop reason: HOSPADM

## 2024-05-29 RX ORDER — MAGNESIUM SULFATE HEPTAHYDRATE 40 MG/ML
2 INJECTION, SOLUTION INTRAVENOUS
Status: COMPLETED | OUTPATIENT
Start: 2024-05-29 | End: 2024-05-29

## 2024-05-29 RX ORDER — ALBUMIN (HUMAN) 12.5 G/50ML
25 SOLUTION INTRAVENOUS ONCE
Status: COMPLETED | OUTPATIENT
Start: 2024-05-29 | End: 2024-05-29

## 2024-05-29 RX ADMIN — FLUTICASONE PROPIONATE 2 SPRAY: 50 SPRAY, METERED NASAL at 09:44

## 2024-05-29 RX ADMIN — POTASSIUM CHLORIDE 40 MEQ: 1500 TABLET, EXTENDED RELEASE ORAL at 23:18

## 2024-05-29 RX ADMIN — POTASSIUM CHLORIDE 40 MEQ: 1500 TABLET, EXTENDED RELEASE ORAL at 09:44

## 2024-05-29 RX ADMIN — MIDODRINE HYDROCHLORIDE 5 MG: 5 TABLET ORAL at 03:33

## 2024-05-29 RX ADMIN — VANCOMYCIN HYDROCHLORIDE 1500 MG: 5 INJECTION, POWDER, LYOPHILIZED, FOR SOLUTION INTRAVENOUS at 11:32

## 2024-05-29 RX ADMIN — ATORVASTATIN CALCIUM 10 MG: 10 TABLET, FILM COATED ORAL at 09:44

## 2024-05-29 RX ADMIN — SODIUM CHLORIDE 150 ML/HR: 9 INJECTION, SOLUTION INTRAVENOUS at 10:57

## 2024-05-29 RX ADMIN — SODIUM CHLORIDE 1000 ML: 9 INJECTION, SOLUTION INTRAVENOUS at 00:48

## 2024-05-29 RX ADMIN — MAGNESIUM SULFATE HEPTAHYDRATE 2 G: 40 INJECTION, SOLUTION INTRAVENOUS at 06:51

## 2024-05-29 RX ADMIN — ACETAMINOPHEN 650 MG: 325 TABLET, FILM COATED ORAL at 20:23

## 2024-05-29 RX ADMIN — POTASSIUM CHLORIDE 40 MEQ: 1500 TABLET, EXTENDED RELEASE ORAL at 06:07

## 2024-05-29 RX ADMIN — ACETAMINOPHEN 650 MG: 325 TABLET, FILM COATED ORAL at 06:46

## 2024-05-29 RX ADMIN — CYCLOBENZAPRINE 5 MG: 10 TABLET, FILM COATED ORAL at 23:18

## 2024-05-29 RX ADMIN — Medication: at 10:58

## 2024-05-29 RX ADMIN — MAGNESIUM SULFATE HEPTAHYDRATE 2 G: 40 INJECTION, SOLUTION INTRAVENOUS at 04:42

## 2024-05-29 RX ADMIN — ACETAMINOPHEN 650 MG: 325 TABLET, FILM COATED ORAL at 15:13

## 2024-05-29 RX ADMIN — SODIUM CHLORIDE 150 ML/HR: 9 INJECTION, SOLUTION INTRAVENOUS at 21:02

## 2024-05-29 RX ADMIN — ALBUMIN (HUMAN) 25 G: 0.25 INJECTION, SOLUTION INTRAVENOUS at 02:25

## 2024-05-29 RX ADMIN — CEFTRIAXONE SODIUM 1000 MG: 1 INJECTION, POWDER, FOR SOLUTION INTRAMUSCULAR; INTRAVENOUS at 09:44

## 2024-05-29 RX ADMIN — MAGNESIUM SULFATE HEPTAHYDRATE 2 G: 40 INJECTION, SOLUTION INTRAVENOUS at 09:44

## 2024-05-29 RX ADMIN — MONTELUKAST 10 MG: 10 TABLET, FILM COATED ORAL at 20:21

## 2024-05-29 RX ADMIN — CYCLOBENZAPRINE 5 MG: 10 TABLET, FILM COATED ORAL at 15:13

## 2024-05-29 RX ADMIN — CYCLOBENZAPRINE 5 MG: 10 TABLET, FILM COATED ORAL at 02:33

## 2024-05-29 RX ADMIN — POTASSIUM CHLORIDE 40 MEQ: 1500 TABLET, EXTENDED RELEASE ORAL at 19:48

## 2024-05-29 RX ADMIN — POTASSIUM CHLORIDE 40 MEQ: 1500 TABLET, EXTENDED RELEASE ORAL at 00:59

## 2024-05-29 RX ADMIN — SODIUM CHLORIDE 125 ML/HR: 9 INJECTION, SOLUTION INTRAVENOUS at 01:47

## 2024-05-29 NOTE — CASE MANAGEMENT/SOCIAL WORK
Discharge Planning Assessment  [facility]     Patient Name: Nicole Silva  MRN: [id number]  Today's Date: 5/29/2024    Admit Date: 5/28/2024    Plan: DCP Home with family to transport. Denies any needs.   Discharge Needs Assessment       Row Name 05/29/24 1342       Living Environment    People in Home alone    Current Living Arrangements home    Duration at Residence 30 years.    Potentially Unsafe Housing Conditions unable to assess    In the past 12 months has the electric, gas, oil, or water company threatened to shut off services in your home? No    Primary Care Provided by self    Provides Primary Care For no one    Family Caregiver if Needed none    Quality of Family Relationships unable to assess    Able to Return to Prior Arrangements yes       Resource/Environmental Concerns    Transportation Concerns none       Transportation Needs    In the past 12 months, has lack of transportation kept you from medical appointments or from getting medications? no    In the past 12 months, has lack of transportation kept you from meetings, work, or from getting things needed for daily living? No       Food Insecurity    Within the past 12 months, you worried that your food would run out before you got the money to buy more. Never true    Within the past 12 months, the food you bought just didn't last and you didn't have money to get more. Never true       Transition Planning    Patient/Family Anticipates Transition to home with family    Patient/Family Anticipated Services at Transition     Transportation Anticipated family or friend will provide       Discharge Needs Assessment    Readmission Within the Last 30 Days no previous admission in last 30 days    Equipment Currently Used at Home bp cuff    Concerns to be Addressed denies needs/concerns at this time    Anticipated Changes Related to Illness none    Equipment Needed After Discharge other (see comments)  To be determined.    Provided Post Acute  Provider List? N/A    Provided Post Acute Provider Quality & Resource List? N/A    Current Discharge Risk     Discharge Coordination/Progress                    Discharge Plan       Row Name 24 [id number]       Plan    Plan DCP Home with family to transport. Denies any needs.    Plan Comments CM spoke with pt at bedside. Permission given to discuss DCP.  Pt confirmed name,,address, and insurance. States Yes to LW, and yes POA, and No to  services. Requested pt to bring LW and POA papers to upload. Verbalized understanding. PCP confirmed as [facility] Andres, last seen 2023. Pharmacy used [facility]. Agreed to meds to bed. DME listed above.  States (I) with ADL's and (I) with mobility.  Lives alone but has a daughter that checks on her frequently. Has a car and 's license but brother takes to all appointments. DCP wants to return Home with family to transport. Denies any needs or services.    Final Discharge Disposition Code 01 - home or self-care                  Continued Care and Services - Admitted Since 2024    No active coordination exists for this encounter.       Expected Discharge Date and Time       Expected Discharge Date Expected Discharge Time    May 30, 2024            Demographic Summary       Row Name 24 1339       General Information    Admission Type observation    Arrived From emergency department    Referral Source admission list    Reason for Consult discharge planning    Preferred Language English       Contact Information    Permission Granted to Share Info With     Contact Information Obtained for                    Functional Status       Row Name 24 1340       Functional Status    Usual Activity Tolerance good    Current Activity Tolerance good       Physical Activity    On average, how many days per week do you engage in moderate to strenuous exercise (like a brisk walk)? 5 days    On average, how many minutes do you engage in  exercise at this level? 60 min    Number of minutes of exercise per week 300       Assessment of Health Literacy    How often do you have someone help you read hospital materials? Never    How often do you have problems learning about your medical condition because of difficulty understanding written information? Never    How often do you have a problem understanding what is told to you about your medical condition? Never    How confident are you filling out medical forms by yourself? 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                   Psychosocial       Row Name 05/29/24 1342       Developmental Stage (Eriksson's)    Developmental Stage Stage 7 (35-65 years/Middle Adulthood) Generativity vs. Stagnation                   Abuse/Neglect    No documentation.                  Legal    No documentation.                  Substance Abuse    No documentation.                  Patient Forms    No documentation.                     Jihan Washington RN

## 2024-05-29 NOTE — PLAN OF CARE
Goal Outcome Evaluation:  Plan of Care Reviewed With: patient        Progress: improving  Outcome Evaluation: patient improving today. No acute issues noted today

## 2024-05-29 NOTE — PAYOR COMM NOTE
"To:  [employer]  From: Nidia Cuevas RN  Phone: [phone]  Fax: [phone]  NPI: [id number]  TIN: [id number]  Member ID: [id number]   MRN: [id number]    OBSERVATION NOTIFICATION    Robbie Silva (58 y.o. Female)       Date of Birth   1965    Social Security Number       Address   [address] [place], [place] [address]    Home Phone   [phone]    MRN   [id number]       Hindu   Jewish    Marital Status                               Admission Date   24    Admission Type   Emergency    Admitting Provider   Pat Alatorre DO    Attending Provider   Kerley, Brian Joseph, DO    Department, Room/Bed   [facility] TELEMETRY 4, 424/1       Discharge Date       Discharge Disposition       Discharge Destination                                 Attending Provider: Kerley, Brian Joseph, DO    Allergies: No Known Allergies    Isolation: Spore   Infection: Norovirus (24)   Code Status: CPR    Ht: 162.6 cm (64\")   Wt: 66.2 kg (145 lb 15.1 oz)    Admission Cmt: None   Principal Problem: Cellulitis of left foot [L03.116]                   Active Insurance as of 2024       Primary Coverage       Payor Plan Insurance Group Employer/Plan Group    [place] [id number]       Payor Plan Address Payor Plan Phone Number Payor Plan Fax Number Effective Dates    [employer] [address] [address] [phone]  2021 - None Entered    [facility] [id number]         Subscriber Name Subscriber Birth Date Member ID       ROBBIE SILVA 1965 [id number]                     Emergency Contacts        (Rel.) Home Phone Work Phone Mobile Phone    aye nair (Daughter) [phone] -- --                 History & Physical        Pat Alatorre DO at 24 [facility]            [facility]   HISTORY AND PHYSICAL      Name:  Robbie Silva   Age:  58 y.o.  Sex:  female  :  1965  MRN:  [id number]   Visit Number:  " [id number]  Admission Date:  2024  Date Of Service:  24  Primary Care Physician:  Aleksandra Andres APRN    Chief Complaint:     Left leg wound    History Of Presenting Illness:      58-year-old with a an apparent history of hypertension, COPD, who presented to the emergency room with complaint of a wound on her left foot and ankle.  Patient states that about a month ago she was at her brother's house when she tripped over a dog, ended up with a laceration to the left foot/ankle.  She did not seek emergency care at the time.  She noted that she ended up going to the emergency room about a week later after it was not healing up and was placed on antibiotic that she completed about 2 weeks ago.  She was also given mupirocin ointment, and instructed on wound care with Betadine.  She notes it does not appear to be improving.  Otherwise she admits to some swelling of her lower extremities over the last 3 to 4 months.  She has not seen a primary care provider in a while as her  most recent 1 has left the practice.  She has lost some weight since last year.  Denies any alcohol or drug use.    In the ER she was overall hemodynamically stable.  She was noted to have hypokalemia, hypocalcemia, hypomagnesemia, hypoalbuminemia, elevated lactic acid, procalcitonin 0.26, and a white count of 12,000 hemoglobin 9.7.  X-ray of the left foot did not show any bony abnormality.  Due to concern for infection of the left foot we were asked to admit.    Review Of Systems:    All systems were reviewed and negative except as mentioned in history of presenting illness, assessment and plan.    Past Medical History: Patient  has a past medical history of COPD (chronic obstructive pulmonary disease) and Hypertension.    Past Surgical History: Patient  has a past surgical history that includes  section (,).    Social History: Patient  reports that she has quit smoking. Her smoking use included cigarettes. She has  been exposed to tobacco smoke. She has never used smokeless tobacco. She reports that she does not drink alcohol and does not use drugs.    Family History:  Patient's family history has been reviewed and found to be noncontributory.     Allergies:      Patient has no known allergies.    Home Medications:    Prior to Admission Medications       Prescriptions Last Dose Informant Patient Reported? Taking?    albuterol 108 (90 Base) MCG/ACT inhaler   Yes No    Inhale.    atorvastatin (LIPITOR) 10 MG tablet   Yes No    Take 1 tablet by mouth Daily.    fluticasone (FLOVENT HFA) 110 MCG/ACT inhaler   No No    Inhale 1 puff twice daily.  Rinse mouth well after using.    levocetirizine (XYZAL) 5 MG tablet   No No    Take 1 tablet by mouth Every Evening for 30 days.    lisinopril (PRINIVIL,ZESTRIL) 10 MG tablet   Yes No    Take 1 tablet by mouth Daily.    methylPREDNISolone (MEDROL) 4 MG dose pack   No No    Take as directed on package instructions.    montelukast (SINGULAIR) 10 MG tablet   No No    Take 1 tablet by mouth Every Night.    Multivitamin tablet tablet   Yes No    progesterone (PROMETRIUM) 100 MG capsule   No No    TAKE ONE CAPSULE BY MOUTH DAILY    Progesterone (PROMETRIUM) 100 MG capsule   Yes No    Take 1 capsule by mouth Every Night.    venlafaxine XR (EFFEXOR-XR) 75 MG 24 hr capsule   Yes No    Take 150 mg by mouth.    vitamin D (ERGOCALCIFEROL) 1.25 MG (94471 UT) capsule capsule   Yes No          ED Medications:    Medications   Potassium Replacement - Follow Nurse / BPA Driven Protocol (has no administration in time range)   potassium chloride 10 mEq in 100 mL IVPB (10 mEq Intravenous New Bag 5/28/24 1839)   Potassium Replacement - Follow Nurse / BPA Driven Protocol (has no administration in time range)   magnesium sulfate 2g/50 mL (PREMIX) infusion (2 g Intravenous New Bag 5/28/24 3702)   vancomycin 1250 mg/250 mL 0.9% NS IVPB (BHS) (has no administration in time range)   piperacillin-tazobactam (ZOSYN)  "IVPB 3.375 g IVPB in 100 mL NS (VTB) (3.375 g Intravenous New Bag 5/28/24 1914)   cefTRIAXone (ROCEPHIN) 2,000 mg in sodium chloride 0.9 % 100 mL IVPB-VTB (0 mg Intravenous Stopped 5/28/24 1718)   sodium chloride 0.9 % bolus 1,000 mL (0 mL Intravenous Stopped 5/28/24 1715)   potassium chloride (MICRO-K/KLOR-CON) CR capsule (40 mEq Oral Given 5/28/24 1839)     Vital Signs:  Temp:  [98.9 °F (37.2 °C)] 98.9 °F (37.2 °C)  Heart Rate:  [105-114] 105  Resp:  [18] 18  BP: ()/(56-68) 92/56        05/28/24  1452   Weight: 58.1 kg (128 lb)     Body mass index is 21.97 kg/m².    Physical Exam:     Most recent vital Signs: BP 92/56 (BP Location: Left arm, Patient Position: Sitting)   Pulse 105   Temp 98.9 °F (37.2 °C) (Oral)   Resp 18   Ht 162.6 cm (64\")   Wt 58.1 kg (128 lb)   SpO2 93%   BMI 21.97 kg/m²     Physical Exam  Constitutional:       General: She is not in acute distress.     Appearance: She is not toxic-appearing.   HENT:      Mouth/Throat:      Mouth: Mucous membranes are moist.      Pharynx: Oropharynx is clear.   Eyes:      Extraocular Movements: Extraocular movements intact.      Pupils: Pupils are equal, round, and reactive to light.   Cardiovascular:      Rate and Rhythm: Normal rate and regular rhythm.      Pulses: Normal pulses.      Heart sounds: No murmur heard.     No gallop.   Pulmonary:      Effort: Pulmonary effort is normal. No respiratory distress.      Breath sounds: No wheezing or rales.   Abdominal:      General: Abdomen is flat. Bowel sounds are normal. There is no distension.      Tenderness: There is no abdominal tenderness.   Musculoskeletal:      Right lower leg: Edema present.      Left lower leg: Edema present.   Skin:     General: Skin is dry.      Findings: Lesion (Left anterior foot with laceration, granulation tissue noted) present.   Neurological:      General: No focal deficit present.      Mental Status: She is alert. Mental status is at baseline.   Psychiatric:         " "Mood and Affect: Mood normal.         Thought Content: Thought content normal.         Laboratory data:    I have reviewed the labs done in the emergency room.    Results from last 7 days   Lab Units 05/28/24  1743   SODIUM mmol/L 139   POTASSIUM mmol/L 2.5*   CHLORIDE mmol/L 98   CO2 mmol/L 27.8   BUN mg/dL 15   CREATININE mg/dL 0.75   CALCIUM mg/dL 7.5*   BILIRUBIN mg/dL 0.2   ALK PHOS U/L 231*   ALT (SGPT) U/L 9   AST (SGOT) U/L 38*   GLUCOSE mg/dL 94     Results from last 7 days   Lab Units 05/28/24  1645   WBC 10*3/mm3 12.86*   HEMOGLOBIN g/dL 9.7*   HEMATOCRIT % 29.0*   PLATELETS 10*3/mm3 219                                   Invalid input(s): \"USDES\", \"NITRITITE\", \"BACT\", \"EP\"    Pain Management Panel           No data to display                EKG:          Radiology:    XR Foot 3+ View Left    Result Date: 5/28/2024  PROCEDURE: XR FOOT 3+ VW LEFT-  HISTORY: large wound on lateral left ankle, concern for osteomyelitis  COMPARISON: None.  FINDINGS:  A three view exam demonstrates no acute fracture or dislocation. The joint spaces appear unremarkable. No radiopaque foreign body identified in the soft tissues. There appears to be mild soft tissue swelling adjacent to the lateral malleolus.      No acute bony abnormality.      This report was signed and finalized on 5/28/2024 4:47 PM by Amna Segovia MD.       Assessment:    Nonhealing left foot laceration/cellulitis, POA  Hypokalemia  Hypomagnesemia  Lactic acidosis  Hypoalbuminemia  Lower extremity edema    Plan:    Left foot wound:  Will attempt to get a culture if any drainage.  Otherwise will need local wound care.  Was given empiric antibiotics with vancomycin and Rocephin for now.  Unsure what antibiotic she was on outpatient previously.  Discussed with Dr. Boland who would see in consult.    Hypokalemia/hypomagnesemia:  Will be replaced.  No recent labs to compare.  Patient did admit to decreased oral intake.    Addendum: Patient did admit to diarrhea for " the last month or so, will send GI panel.    Hypoalbuminemia/edema:  Unsure chronicity, nutrition consult, will add lower extremity Dopplers.  She may need further workup if has ongoing weight loss.  Urinalysis to check for protein as well.    Anticipate less than 2 midnight stay further recommendations are predicated upon improvement in clinical condition.    Risk Assessment: Moderate  DVT Prophylaxis: Lovenox  Code Status: Full  Diet: As tolerated    Advance Care Planning  ACP discussion was held with the patient during this visit. Patient does not have an advance directive, declines further assistance.           Pat Alatorre DO  24  19:24 EDT    Dictated utilizing Dragon dictation.    Electronically signed by Pat Alatorre DO at 24 2218          Emergency Department Notes        Ricki Giraldo PA-C at 24 1706        Procedure Orders    1. Critical Care [[id number]] ordered by Ricki Giraldo PA-C                  EMERGENCY DEPARTMENT ENCOUNTER    Pt Name: Nicole Silva  MRN: [id number]  Pt :   1965  Room Number:  23SF/23  Date of encounter:  2024  PCP: Aleksandra Andres APRN  ED Provider: Ricki Giraldo PA-C    Historian: Patient, nursing notes      HPI:  Chief Complaint: Wound on left ankle        Context: Nicole Silva is a 58 y.o. female who presents to the ED c/o a wound on her left ankle for the past 3 weeks.  Patient states she had a laceration on her left ankle 3 weeks ago that then became infected.  She did take a 1 week course of antibiotics which she completed 2 weeks ago but the wound has remained unhealed.  Patient reports drainage of green pus from the wound.  Patient denies any fever or chills, leg swelling, numbness or tingling in the lower extremity, shaking, tremors, or any other complaint today.      PAST MEDICAL HISTORY  Past Medical History:   Diagnosis Date    COPD (chronic obstructive pulmonary disease)     Hypertension          PAST  SURGICAL HISTORY  Past Surgical History:   Procedure Laterality Date     SECTION  ,         FAMILY HISTORY  Family History   Problem Relation Age of Onset    Ovarian cancer Mother     Breast cancer Neg Hx          SOCIAL HISTORY  Social History     Socioeconomic History    Marital status:    Tobacco Use    Smoking status: Former     Types: Cigarettes     Passive exposure: Past    Smokeless tobacco: Never   Vaping Use    Vaping status: Never Used   Substance and Sexual Activity    Alcohol use: No    Drug use: No    Sexual activity: Yes     Partners: Male     Birth control/protection: Post-menopausal         ALLERGIES  Patient has no known allergies.        REVIEW OF SYSTEMS  Review of Systems   Constitutional:  Negative for chills and fever.   HENT:  Negative for congestion and sore throat.    Respiratory:  Negative for cough and shortness of breath.    Cardiovascular:  Negative for chest pain.   Gastrointestinal:  Negative for abdominal pain, nausea and vomiting.   Genitourinary:  Negative for dysuria.   Musculoskeletal:  Negative for back pain.   Skin:  Positive for wound.   Neurological:  Negative for dizziness and headaches.   Psychiatric/Behavioral:  Negative for confusion.    All other systems reviewed and are negative.         All systems reviewed and negative except for those discussed in HPI.       PHYSICAL EXAM    I have reviewed the triage vital signs and nursing notes.    ED Triage Vitals [24 1452]   Temp Heart Rate Resp BP SpO2   98.9 °F (37.2 °C) 114 18 102/68 98 %      Temp src Heart Rate Source Patient Position BP Location FiO2 (%)   Oral Monitor Sitting Left arm --       Physical Exam  Vitals and nursing note reviewed.   Constitutional:       General: She is not in acute distress.     Appearance: She is not ill-appearing, toxic-appearing or diaphoretic.   HENT:      Head: Normocephalic and atraumatic.      Mouth/Throat:      Mouth: Mucous membranes are moist.       Pharynx: Oropharynx is clear.   Eyes:      Extraocular Movements: Extraocular movements intact.   Cardiovascular:      Rate and Rhythm: Normal rate.      Heart sounds: Normal heart sounds.   Pulmonary:      Effort: Pulmonary effort is normal. No respiratory distress.      Breath sounds: Normal breath sounds.   Abdominal:      Tenderness: There is no abdominal tenderness.   Skin:     General: Skin is warm and dry.      Findings: Wound present. No rash.          Neurological:      Mental Status: She is alert.             LAB RESULTS  Recent Results (from the past 24 hour(s))   Lactic Acid, Plasma    Collection Time: 05/28/24  4:45 PM    Specimen: Blood   Result Value Ref Range    Lactate 6.7 (C) 0.5 - 2.0 mmol/L   Procalcitonin    Collection Time: 05/28/24  4:45 PM    Specimen: Blood   Result Value Ref Range    Procalcitonin 0.26 (H) 0.00 - 0.25 ng/mL   C-reactive Protein    Collection Time: 05/28/24  4:45 PM    Specimen: Blood   Result Value Ref Range    C-Reactive Protein <0.30 0.00 - 0.50 mg/dL   CBC Auto Differential    Collection Time: 05/28/24  4:45 PM    Specimen: Blood   Result Value Ref Range    WBC 12.86 (H) 3.40 - 10.80 10*3/mm3    RBC 3.00 (L) 3.77 - 5.28 10*6/mm3    Hemoglobin 9.7 (L) 12.0 - 15.9 g/dL    Hematocrit 29.0 (L) 34.0 - 46.6 %    MCV 96.7 79.0 - 97.0 fL    MCH 32.3 26.6 - 33.0 pg    MCHC 33.4 31.5 - 35.7 g/dL    RDW 13.8 12.3 - 15.4 %    RDW-SD 48.8 37.0 - 54.0 fl    MPV 10.9 6.0 - 12.0 fL    Platelets 219 140 - 450 10*3/mm3    Neutrophil % 76.9 (H) 42.7 - 76.0 %    Lymphocyte % 14.3 (L) 19.6 - 45.3 %    Monocyte % 6.1 5.0 - 12.0 %    Eosinophil % 1.3 0.3 - 6.2 %    Basophil % 0.9 0.0 - 1.5 %    Immature Grans % 0.5 0.0 - 0.5 %    Neutrophils, Absolute 9.89 (H) 1.70 - 7.00 10*3/mm3    Lymphocytes, Absolute 1.84 0.70 - 3.10 10*3/mm3    Monocytes, Absolute 0.78 0.10 - 0.90 10*3/mm3    Eosinophils, Absolute 0.17 0.00 - 0.40 10*3/mm3    Basophils, Absolute 0.11 0.00 - 0.20 10*3/mm3    Immature  Grans, Absolute 0.07 (H) 0.00 - 0.05 10*3/mm3    nRBC 0.0 0.0 - 0.2 /100 WBC   Sedimentation Rate    Collection Time: 05/28/24  4:45 PM    Specimen: Blood   Result Value Ref Range    Sed Rate 11 0 - 30 mm/hr   Comprehensive Metabolic Panel    Collection Time: 05/28/24  5:43 PM    Specimen: Blood   Result Value Ref Range    Glucose 94 65 - 99 mg/dL    BUN 15 6 - 20 mg/dL    Creatinine 0.75 0.57 - 1.00 mg/dL    Sodium 139 136 - 145 mmol/L    Potassium 2.5 (C) 3.5 - 5.2 mmol/L    Chloride 98 98 - 107 mmol/L    CO2 27.8 22.0 - 29.0 mmol/L    Calcium 7.5 (L) 8.6 - 10.5 mg/dL    Total Protein 4.8 (L) 6.0 - 8.5 g/dL    Albumin 2.1 (L) 3.5 - 5.2 g/dL    ALT (SGPT) 9 1 - 33 U/L    AST (SGOT) 38 (H) 1 - 32 U/L    Alkaline Phosphatase 231 (H) 39 - 117 U/L    Total Bilirubin 0.2 0.0 - 1.2 mg/dL    Globulin 2.7 gm/dL    A/G Ratio 0.8 g/dL    BUN/Creatinine Ratio 20.0 7.0 - 25.0    Anion Gap 13.2 5.0 - 15.0 mmol/L    eGFR 93.0 >60.0 mL/min/1.73   Magnesium    Collection Time: 05/28/24  5:43 PM    Specimen: Blood   Result Value Ref Range    Magnesium 0.9 (C) 1.6 - 2.6 mg/dL       If labs were ordered, I independently reviewed the results and considered them in treating the patient.        RADIOLOGY  XR Foot 3+ View Left    Result Date: 5/28/2024  PROCEDURE: XR FOOT 3+ VW LEFT-  HISTORY: large wound on lateral left ankle, concern for osteomyelitis  COMPARISON: None.  FINDINGS:  A three view exam demonstrates no acute fracture or dislocation. The joint spaces appear unremarkable. No radiopaque foreign body identified in the soft tissues. There appears to be mild soft tissue swelling adjacent to the lateral malleolus.      No acute bony abnormality.      This report was signed and finalized on 5/28/2024 4:47 PM by Amna Segovia MD.       I ordered and independently reviewed the above noted radiographic studies.      I viewed images of left ankle x-ray which showed no evidence of osteomyelitis per my independent interpretation.    See  radiologist's dictation for official interpretation.        PROCEDURES    Critical Care    Performed by: Ricki Giraldo PA-C  Authorized by: Og Richards DO    Critical care provider statement:     Critical care time (minutes):  45    Critical care was necessary to treat or prevent imminent or life-threatening deterioration of the following conditions:  Sepsis (Hypokalemia and hypomagnesemia)    Critical care was time spent personally by me on the following activities:  Development of treatment plan with patient or surrogate, discussions with consultants, evaluation of patient's response to treatment, examination of patient, interpretation of cardiac output measurements, obtaining history from patient or surrogate, review of old charts, re-evaluation of patient's condition, pulse oximetry, ordering and review of radiographic studies, ordering and review of laboratory studies and ordering and performing treatments and interventions    Care discussed with: admitting provider        ECG 12 Lead Electrolyte Imbalance    (Results Pending)       MEDICATIONS GIVEN IN ER    Medications   Potassium Replacement - Follow Nurse / BPA Driven Protocol (has no administration in time range)   potassium chloride (MICRO-K/KLOR-CON) CR capsule (has no administration in time range)   potassium chloride 10 mEq in 100 mL IVPB (has no administration in time range)   Potassium Replacement - Follow Nurse / BPA Driven Protocol (has no administration in time range)   magnesium sulfate 2g/50 mL (PREMIX) infusion (has no administration in time range)   cefTRIAXone (ROCEPHIN) 2,000 mg in sodium chloride 0.9 % 100 mL IVPB-VTB (0 mg Intravenous Stopped 5/28/24 1718)   sodium chloride 0.9 % bolus 1,000 mL (0 mL Intravenous Stopped 5/28/24 1715)         MEDICAL DECISION MAKING, PROGRESS, and CONSULTS    All labs, if obtained, have been independently reviewed by me.  All radiology studies, if obtained, have been reviewed by me and the  radiologist dictating the report.  All EKG's, if obtained, have been independently viewed and interpreted by me/my attending physician.      Discussion below represents my analysis of pertinent findings related to patient's condition, differential diagnosis, treatment plan and final disposition.    58-year-old female with a wound to the left foot for the past month.  She has failed outpatient antibiotics which she completed several weeks ago.  4 to 5 cm wound noted with some greenish granulation tissue on the left lateral foot noted.  Patient was tachycardic on arrival with a rate of 114 normotensive, temperature 98.9 °F, SpO2 98%.  She is upright alert and oriented in no acute distress.    X-ray of the left foot ordered to rule out osteomyelitis per radiology report there was no no evidence of bony abnormalities.  Due to obvious source of infection and tachycardia sepsis workup was initiated.  CBC showed a white blood cell count over 12,000.  Patient given 1 L normal saline as she was having some bilateral peripheral edema and there was concern for volume overload.  IV Rocephin started after blood cultures drawn.     Initial lactic acid was 6.7 confirming some concern for sepsis.  Hypokalemia noted with a potassium of 2.5.  Hypomagnesemia noted with a magnesium of 0.9.  Electrolyte repletion protocols were ordered.  EKG obtained.  Discussed patient's care with ED attending Dr. Richards.  Discussed patient's care with hospital medicine Dr. Alatorre.  Discussed patient's care and dosing instructions with hospital pharmacist.  Patient will be admitted to [facility] ER                     Differential diagnosis:    Differential diagnosis included but was not limited to osteomyelitis, cellulitis, wound infection, among others      Additional sources:    - Discussed/ obtained information from independent historians: None    - External (non-ED) record review: Recent emergency department visit records    -  Chronic or social conditions impacting care: None    Orders placed during this visit:  Orders Placed This Encounter   Procedures    Critical Care    Blood Culture With NEMO - Blood,    Blood Culture With NEMO - Blood,    XR Foot 3+ View Left    Comprehensive Metabolic Panel    Lactic Acid, Plasma    Procalcitonin    C-reactive Protein    Magnesium    CBC Auto Differential    Sedimentation Rate    STAT Lactic Acid, Reflex    Phosphorus    ECG 12 Lead Electrolyte Imbalance    CBC & Differential         Additional orders considered but not ordered: None      ED Course:    Consultants: ED attending Dr. Richards, hospital pharmacist, hospital medicine Dr. Alatorre    ED Course as of 05/28/24 1833   Tue May 28, 2024   1701 I have interviewed and examined the patient FACE-TO-FACE.  I reviewed the mid-level provider(s) note and agree with the clinical impression, plan, and disposition unless otherwise stated in the MDM below.    ATTENDING ATTESTATION  HPI: 58-year-old female presents to the ER with chief complaint of wound check.  Patient reports that she cut her left anterior foot 1 month ago on a table at her brother's house.  Since then the wound has not healed correctly.  She has seen someone in the outpatient setting who has been treating with oral antibiotics with no relief.  No other associated symptoms including fever, chills, redness, numbness or weakness in the extremity.    EXAM:   General: Alert.  Nontoxic appearance.  No acute distress.  Head: Normocephalic.  Atraumatic.  Eyes: No scleral icterus.  Respiratory: Nonlabored breathing.  GI: Abdomen is nondistended.  Neurologic: Oriented x 3.  No focal deficits.  MSK: No bony tenderness to the left lower extremity.  Skin: Opened, superficial wound to the left anterior proximal foot.  No surrounding erythema.  No edema.  No pallor.  No cyanosis.     [JS]   1714 WBC(!): 12.86  White blood cell count 12.86 noted [TG]   1717 XR Foot 3+ View Left  Radiology overread  confirms no evidence of osteomyelitis no acute bony abnormalities on x-ray of the left foot [TG]   1831 Potassium(!!): 2.5  Potassium 2.5 noted [TG]   1831 Procalcitonin(!)  Procalcitonin elevated 0.26 [TG]   1831 Lactic Acid, Plasma(!!)  Lactic acid 6.7 noted [TG]   1831 Magnesium(!!)  Magnesium 0.9 noted [TG]   1832 Alkaline Phosphatase(!): 231  Alk phos elevated at 231 [TG]      ED Course User Index  [JS] Og Richards DO  [TG] Ricki Giraldo PA-C              Shared Decision Making:  After my consideration of clinical presentation and any laboratory/radiology studies obtained, I discussed the findings with the patient/patient representative who is in agreement with the treatment plan and the final disposition.   Risks and benefits of discharge and/or observation/admission were discussed.       AS OF 18:33 EDT VITALS:    BP - 102/68  HR - 114  TEMP - 98.9 °F (37.2 °C) (Oral)  O2 SATS - 98%                  DIAGNOSIS  Final diagnoses:   Cellulitis of left foot   Hypokalemia   Lactic acidosis   Hypomagnesemia         DISPOSITION  Admit      Please note that portions of this document were completed with voice recognition software.      Ricki Giraldo PA-C  05/28/24 1857      Electronically signed by Ricki Giraldo PA-C at 05/28/24 1857       Vital Signs (last day)       Date/Time Temp Temp src Pulse Resp BP Patient Position SpO2    05/29/24 0741 -- -- 103 18 97/55 Sitting --    05/29/24 0443 -- -- 96 -- 101/59 -- --    05/29/24 0335 -- -- 100 -- 99/59 -- --    05/29/24 0316 -- -- 107 -- 80/42 -- --    05/29/24 0313 98.3 (36.8) Oral 106 18 75/38 Lying 96    05/29/24 0226 -- -- 104 -- 104/56 -- --    05/29/24 0054 -- -- 96 -- 102/61 Lying 93    05/28/24 2349 98.8 (37.1) Oral 99 18 87/50 Lying 94    05/28/24 1953 98.5 (36.9) Oral 113 18 97/55 Lying 97    05/28/24 1906 -- -- 105 18 92/56 Sitting 93    05/28/24 1452 98.9 (37.2) Oral 114 18 102/68 Sitting 98          Current Facility-Administered Medications    Medication Dose Route Frequency Provider Last Rate Last Admin    acetaminophen (TYLENOL) tablet 650 mg  650 mg Oral Q4H PRN Pat Alatorre DO   650 mg at 05/29/24 0646    Or    acetaminophen (TYLENOL) 160 MG/5ML oral solution 650 mg  650 mg Oral Q4H PRN Pat Alatorre DO        Or    acetaminophen (TYLENOL) suppository 650 mg  650 mg Rectal Q4H PRN Pat Alatorre DO        aluminum-magnesium hydroxide-simethicone (MAALOX MAX) 400-400-40 MG/5ML suspension 15 mL  15 mL Oral Q6H PRN Pat Alatorre DO        atorvastatin (LIPITOR) tablet 10 mg  10 mg Oral Daily Pat Alatorre DO        sennosides-docusate (PERICOLACE) 8.6-50 MG per tablet 2 tablet  2 tablet Oral BID PRN Pat Alatorre DO        And    polyethylene glycol (MIRALAX) packet 17 g  17 g Oral Daily PRN Pat Alatorre DO        And    bisacodyl (DULCOLAX) EC tablet 5 mg  5 mg Oral Daily PRN Pat Alatorre DO        And    bisacodyl (DULCOLAX) suppository 10 mg  10 mg Rectal Daily PRN Pat Alatorre DO        cefTRIAXone (ROCEPHIN) 1,000 mg in sodium chloride 0.9 % 100 mL IVPB-VTB  1,000 mg Intravenous Q24H Pat Alatorre DO        cyclobenzaprine (FLEXERIL) tablet 5 mg  5 mg Oral TID PRN Pat Alatorre DO   5 mg at 05/29/24 0233    diphenhydrAMINE (BENADRYL) capsule 25 mg  25 mg Oral Nightly PRN Pat Alatorre DO        Enoxaparin Sodium (LOVENOX) syringe 40 mg  40 mg Subcutaneous Q24H Pat Alatorre DO        fluticasone (FLONASE) 50 MCG/ACT nasal spray 2 spray  2 spray Nasal Daily Pat Alatorre DO        Magnesium Standard Dose Replacement - Follow Nurse / BPA Driven Protocol   Does not apply PRN Pat Alatorre DO        magnesium sulfate 2g/50 mL (PREMIX) infusion  2 g Intravenous Q2H Pat Alatorre, DO   2 g at 05/29/24 0651    montelukast (SINGULAIR) tablet 10 mg  10 mg Oral Nightly Pat Alatorre, DO    10 mg at 05/28/24 2136    nitroglycerin (NITROSTAT) SL tablet 0.4 mg  0.4 mg Sublingual Q5 Min PRN Pat Alatorre, DO        ondansetron ODT (ZOFRAN-ODT) disintegrating tablet 4 mg  4 mg Oral Q6H PRN Pat Alatorre,         Or    ondansetron (ZOFRAN) injection 4 mg  4 mg Intravenous Q6H PRN Pat Alatorre,         Pharmacy to Dose enoxaparin (LOVENOX)   Does not apply Continuous PRN Pat Alatorre,         Pharmacy to dose vancomycin   Does not apply Continuous PRN Pat Altaorre, DO        potassium chloride (KLOR-CON M20) CR tablet 40 mEq  40 mEq Oral Q4H Pat Alatorre, DO   40 mEq at 05/29/24 0607    Potassium Replacement - Follow Nurse / BPA Driven Protocol   Does not apply PRN Pat Alatorre,         Potassium Replacement - Follow Nurse / BPA Driven Protocol   Does not apply PRN Pat Alatorre, DO        sodium chloride 0.9 % flush 10 mL  10 mL Intravenous Q12H Pat Alatorre, DO   10 mL at 05/28/24 2137    sodium chloride 0.9 % flush 10 mL  10 mL Intravenous PRN Pat Alatorre,         sodium chloride 0.9 % infusion 40 mL  40 mL Intravenous PRN Pat Alatorre,         sodium chloride 0.9 % infusion  150 mL/hr Intravenous Continuous Pat Alatorre,  mL/hr at 05/29/24 0206 150 mL/hr at 05/29/24 0206    vancomycin IVPB 1500 mg in 0.9% NaCl (Premix) 500 mL  1,500 mg Intravenous Q24H Pat Alatorre, DO         Lab Results (last 24 hours)       Procedure Component Value Units Date/Time    STAT Lactic Acid, Reflex [[id number]] Collected: 05/29/24 0850    Specimen: Blood Updated: 05/29/24 0856    Blood Culture With NEMO - Blood, Arm, Left [[id number]]  (Normal) Collected: 05/28/24 1640    Specimen: Blood from Arm, Left Updated: 05/29/24 0500     Blood Culture No growth at less than 24 hours    Blood Culture With NEMO - Blood, Hand, Left [[id number]]  (Normal) Collected: 05/28/24 1506    Specimen: Blood  from Hand, Left Updated: 05/29/24 0500     Blood Culture No growth at less than 24 hours    Magnesium [[id number]]  (Abnormal) Collected: 05/29/24 0300    Specimen: Blood Updated: 05/29/24 0402     Magnesium 1.2 mg/dL     Comprehensive Metabolic Panel [[id number]]  (Abnormal) Collected: 05/29/24 0300    Specimen: Blood Updated: 05/29/24 0333     Glucose 93 mg/dL      BUN 13 mg/dL      Creatinine 0.74 mg/dL      Sodium 138 mmol/L      Potassium 2.5 mmol/L      Chloride 100 mmol/L      CO2 24.7 mmol/L      Calcium 7.0 mg/dL      Total Protein 4.7 g/dL      Albumin 2.5 g/dL      ALT (SGPT) 9 U/L      AST (SGOT) 50 U/L      Alkaline Phosphatase 238 U/L      Total Bilirubin 0.4 mg/dL      Globulin 2.2 gm/dL      A/G Ratio 1.1 g/dL      BUN/Creatinine Ratio 17.6     Anion Gap 13.3 mmol/L      eGFR 94.5 mL/min/1.73     Narrative:      GFR Normal >60  Chronic Kidney Disease <60  Kidney Failure <15      STAT Lactic Acid, Reflex [[id number]]  (Abnormal) Collected: 05/29/24 0300    Specimen: Blood Updated: 05/29/24 0331     Lactate 4.0 mmol/L     Hemoglobin & Hematocrit, Blood [[id number]]  (Abnormal) Collected: 05/29/24 0300    Specimen: Blood Updated: 05/29/24 0309     Hemoglobin 8.1 g/dL      Hematocrit 24.7 %     Hepatitis Panel, Acute [[id number]] Collected: 05/29/24 0300    Specimen: Blood Updated: 05/29/24 0304    Gastrointestinal Panel, PCR - Stool, Per Rectum [[id number]]  (Abnormal) Collected: 05/29/24 0047    Specimen: Stool from Per Rectum Updated: 05/29/24 0232     Campylobacter Not Detected     Plesiomonas shigelloides Not Detected     Salmonella Not Detected     Vibrio Not Detected     Vibrio cholerae Not Detected     Yersinia enterocolitica Not Detected     Enteroaggregative E. coli (EAEC) Not Detected     Enteropathogenic E. coli (EPEC) Not Detected     Enterotoxigenic E. coli (ETEC) lt/st Not Detected     Shiga-like toxin-producing E. coli (STEC) stx1/stx2 Not Detected     Shigella/Enteroinvasive E. coli (EIEC) Not  Detected     Cryptosporidium Not Detected     Cyclospora cayetanensis Not Detected     Entamoeba histolytica Not Detected     Giardia lamblia Not Detected     Adenovirus F40/41 Not Detected     Astrovirus Not Detected     Norovirus GI/GII Detected     Comment: If a positive Norovirus result is inconsistent with clinical presentation, the positive Norovirus result should be confirmed using another method.        Rotavirus A Not Detected     Sapovirus (I, II, IV or V) Not Detected    Wound Culture - Wound, Foot, Left [[id number]] Collected: 05/28/24 2109    Specimen: Wound from Foot, Left Updated: 05/29/24 0119     Gram Stain No No organisms seen    Urine Drug Screen - Urine, Clean Catch [[id number]]  (Normal) Collected: 05/29/24 0047    Specimen: Urine, Clean Catch Updated: 05/29/24 0109     THC, Screen, Urine Negative     Phencyclidine (PCP), Urine Negative     Cocaine Screen, Urine Negative     Methamphetamine, Ur Negative     Opiate Screen Negative     Amphetamine Screen, Urine Negative     Benzodiazepine Screen, Urine Negative     Tricyclic Antidepressants Screen Negative     Methadone Screen, Urine Negative     Barbiturates Screen, Urine Negative     Oxycodone Screen, Urine Negative     Buprenorphine, Screen, Urine Negative    Narrative:      Cutoff For Drugs Screened:    Amphetamines               500 ng/ml  Barbiturates               200 ng/ml  Benzodiazepines            150 ng/ml  Cocaine                    150 ng/ml  Methadone                  200 ng/ml  Opiates                    100 ng/ml  Phencyclidine               25 ng/ml  THC                         50 ng/ml  Methamphetamine            500 ng/ml  Tricyclic Antidepressants  300 ng/ml  Oxycodone                  100 ng/ml  Buprenorphine               10 ng/ml    The normal value for all drugs tested is negative. This report includes unconfirmed screening results, with the cutoff values listed, to be used for medical treatment purposes only.  Unconfirmed  results must not be used for non-medical purposes such as employment or legal testing.  Clinical consideration should be applied to any drug of abuse test, particularly when unconfirmed results are used.      Fentanyl, Urine - Urine, Clean Catch [[id number]]  (Normal) Collected: 05/29/24 0047    Specimen: Urine, Clean Catch Updated: 05/29/24 0108     Fentanyl, Urine Negative    Narrative:      Negative Threshold:      Fentanyl 5 ng/mL     The normal value for the drug tested is negative. This report includes final unconfirmed screening results to be used for medical treatment purposes only. Unconfirmed results must not be used for non-medical purposes such as employment or legal testing. Clinical consideration should be applied to any drug of abuse test, particularly when unconfirmed results are used.           Urinalysis, Microscopic Only - Urine, Clean Catch [[id number]]  (Abnormal) Collected: 05/29/24 0047    Specimen: Urine, Clean Catch Updated: 05/29/24 0102     RBC, UA 3-5 /HPF      WBC, UA 6-10 /HPF      Bacteria, UA 2+ /HPF      Squamous Epithelial Cells, UA 3-6 /HPF      Hyaline Casts, UA None Seen /LPF      Methodology Manual Light Microscopy    Urine Culture - Urine, Urine, Clean Catch [[id number]] Collected: 05/29/24 0047    Specimen: Urine, Clean Catch Updated: 05/29/24 0102    Urinalysis With Culture If Indicated - Urine, Clean Catch [[id number]]  (Abnormal) Collected: 05/29/24 0047    Specimen: Urine, Clean Catch Updated: 05/29/24 0057     Color, UA Yellow     Appearance, UA Cloudy     pH, UA 5.5     Specific Gravity, UA 1.017     Glucose, UA Negative     Ketones, UA Negative     Bilirubin, UA Negative     Blood, UA Trace     Protein, UA Trace     Leuk Esterase, UA Moderate (2+)     Nitrite, UA Negative     Urobilinogen, UA 0.2 E.U./dL    Narrative:      In absence of clinical symptoms, the presence of pyuria, bacteria, and/or nitrites on the urinalysis result does not correlate with infection.    STAT  Lactic Acid, Reflex [[id number]]  (Abnormal) Collected: 05/28/24 2346    Specimen: Blood Updated: 05/29/24 0032     Lactate 7.9 mmol/L     STAT Lactic Acid, Reflex [[id number]]  (Abnormal) Collected: 05/28/24 2005    Specimen: Blood Updated: 05/28/24 2046     Lactate 4.7 mmol/L     Hemoglobin A1c [[id number]]  (Abnormal) Collected: 05/28/24 1645    Specimen: Blood Updated: 05/28/24 1921     Hemoglobin A1C 4.60 %     Narrative:      Hemoglobin A1C Ranges:    Increased Risk for Diabetes  5.7% to 6.4%  Diabetes                     >= 6.5%  Diabetic Goal                < 7.0%    TSH [[id number]]  (Normal) Collected: 05/28/24 1743    Specimen: Blood Updated: 05/28/24 1918     TSH 2.450 uIU/mL     Phosphorus [[id number]]  (Normal) Collected: 05/28/24 1743    Specimen: Blood Updated: 05/28/24 1852     Phosphorus 3.1 mg/dL     Ethanol [[id number]] Collected: 05/28/24 1743    Specimen: Blood Updated: 05/28/24 1852     Ethanol <10 mg/dL      Ethanol % <0.010 %     Narrative:      This result is for medical use only and should not be used for forensic purposes.    Magnesium [[id number]]  (Abnormal) Collected: 05/28/24 1743    Specimen: Blood Updated: 05/28/24 1829     Magnesium 0.9 mg/dL     Comprehensive Metabolic Panel [[id number]]  (Abnormal) Collected: 05/28/24 1743    Specimen: Blood Updated: 05/28/24 1812     Glucose 94 mg/dL      BUN 15 mg/dL      Creatinine 0.75 mg/dL      Sodium 139 mmol/L      Potassium 2.5 mmol/L      Comment: Slight hemolysis detected by analyzer. Result may be falsely elevated.        Chloride 98 mmol/L      CO2 27.8 mmol/L      Calcium 7.5 mg/dL      Total Protein 4.8 g/dL      Albumin 2.1 g/dL      ALT (SGPT) 9 U/L      AST (SGOT) 38 U/L      Alkaline Phosphatase 231 U/L      Total Bilirubin 0.2 mg/dL      Globulin 2.7 gm/dL      A/G Ratio 0.8 g/dL      BUN/Creatinine Ratio 20.0     Anion Gap 13.2 mmol/L      eGFR 93.0 mL/min/1.73     Narrative:      GFR Normal >60  Chronic Kidney Disease <60  Kidney  "Failure <15      Lactic Acid, Plasma [[id number]]  (Abnormal) Collected: 05/28/24 1645    Specimen: Blood Updated: 05/28/24 1726     Lactate 6.7 mmol/L     Procalcitonin [[id number]]  (Abnormal) Collected: 05/28/24 1645    Specimen: Blood Updated: 05/28/24 1724     Procalcitonin 0.26 ng/mL     Narrative:      As a Marker for Sepsis (Non-Neonates):    1. <0.5 ng/mL represents a low risk of severe sepsis and/or septic shock.  2. >2 ng/mL represents a high risk of severe sepsis and/or septic shock.    As a Marker for Lower Respiratory Tract Infections that require antibiotic therapy:    PCT on Admission    Antibiotic Therapy       6-12 Hrs later    >0.5                Strongly Recommended  >0.25 - <0.5        Recommended   0.1 - 0.25          Discouraged              Remeasure/reassess PCT  <0.1                Strongly Discouraged     Remeasure/reassess PCT    As 28 day mortality risk marker: \"Change in Procalcitonin Result\" (>80% or <=80%) if Day 0 (or Day 1) and Day 4 values are available. Refer to http://www.[employer]-pct-calculator.com    Change in PCT <=80%  A decrease of PCT levels below or equal to 80% defines a positive change in PCT test result representing a higher risk for 28-day all-cause mortality of patients diagnosed with severe sepsis for septic shock.    Change in PCT >80%  A decrease of PCT levels of more than 80% defines a negative change in PCT result representing a lower risk for 28-day all-cause mortality of patients diagnosed with severe sepsis or septic shock.       C-reactive Protein [[id number]]  (Normal) Collected: 05/28/24 1645    Specimen: Blood Updated: 05/28/24 1717     C-Reactive Protein <0.30 mg/dL     Sedimentation Rate [[id number]]  (Normal) Collected: 05/28/24 1645    Specimen: Blood Updated: 05/28/24 1658     Sed Rate 11 mm/hr     CBC & Differential [[id number]]  (Abnormal) Collected: 05/28/24 1645    Specimen: Blood Updated: 05/28/24 1657    Narrative:      The following orders were " created for panel order CBC & Differential.  Procedure                               Abnormality         Status                     ---------                               -----------         ------                     CBC Auto Differential[[id number]]        Abnormal            Final result                 Please view results for these tests on the individual orders.    CBC Auto Differential [[id number]]  (Abnormal) Collected: 05/28/24 1645    Specimen: Blood Updated: 05/28/24 1657     WBC 12.86 10*3/mm3      RBC 3.00 10*6/mm3      Hemoglobin 9.7 g/dL      Hematocrit 29.0 %      MCV 96.7 fL      MCH 32.3 pg      MCHC 33.4 g/dL      RDW 13.8 %      RDW-SD 48.8 fl      MPV 10.9 fL      Platelets 219 10*3/mm3      Neutrophil % 76.9 %      Lymphocyte % 14.3 %      Monocyte % 6.1 %      Eosinophil % 1.3 %      Basophil % 0.9 %      Immature Grans % 0.5 %      Neutrophils, Absolute 9.89 10*3/mm3      Lymphocytes, Absolute 1.84 10*3/mm3      Monocytes, Absolute 0.78 10*3/mm3      Eosinophils, Absolute 0.17 10*3/mm3      Basophils, Absolute 0.11 10*3/mm3      Immature Grans, Absolute 0.07 10*3/mm3      nRBC 0.0 /100 WBC           Imaging Results (Last 24 Hours)       Procedure Component Value Units Date/Time    XR Foot 3+ View Left [[id number]] Collected: 05/28/24 1645     Updated: 05/28/24 1649    Narrative:      PROCEDURE: XR FOOT 3+ VW LEFT-     HISTORY: large wound on lateral left ankle, concern for osteomyelitis     COMPARISON: None.     FINDINGS:  A three view exam demonstrates no acute fracture or  dislocation. The joint spaces appear unremarkable. No radiopaque foreign  body identified in the soft tissues. There appears to be mild soft  tissue swelling adjacent to the lateral malleolus.       Impression:      No acute bony abnormality.                 This report was signed and finalized on 5/28/2024 4:47 PM by Amna Segovia MD.             Orders (last 24 hrs)        Start     Ordered    05/30/24 2220  Auto  Discontinue GI Panel in 48 Hours if not Collected  ONCE GI PANEL         05/28/24 2219 05/30/24 0600  Vancomycin, Random  Morning Draw         05/28/24 2111 05/30/24 0000  Magnesium  Morning Draw         05/29/24 0418    05/29/24 1349  Potassium  Timed         05/29/24 0048    05/29/24 1000  vancomycin IVPB 1500 mg in 0.9% NaCl (Premix) 500 mL  Every 24 Hours         05/28/24 2111 05/29/24 0904  MRSA Screen, PCR (Inpatient) - Swab, Nares  Once         05/29/24 0903    05/29/24 0900  fluticasone (FLONASE) 50 MCG/ACT nasal spray 2 spray  Daily         05/28/24 2050 05/29/24 0900  atorvastatin (LIPITOR) tablet 10 mg  Daily         05/28/24 2050 05/29/24 0900  cefTRIAXone (ROCEPHIN) 1,000 mg in sodium chloride 0.9 % 100 mL IVPB-VTB  Every 24 Hours         05/28/24 2051 05/29/24 0900  STAT Lactic Acid, Reflex  PROCEDURE ONCE         05/29/24 0331    05/29/24 0800  Oral Care  2 Times Daily       05/28/24 2050 05/29/24 0600  Comprehensive Metabolic Panel  Morning Draw         05/28/24 2051 05/29/24 0600  Hemoglobin & Hematocrit, Blood  Morning Draw         05/28/24 2051 05/29/24 0600  Hepatitis Panel, Acute  Morning Draw         05/28/24 2219 05/29/24 0515  magnesium sulfate 2g/50 mL (PREMIX) infusion  Every 2 Hours         05/29/24 0418    05/29/24 0455  Calcium Replacement - Follow Nurse / BPA Driven Protocol  As Needed,   Status:  Discontinued         05/29/24 0455    05/29/24 0418  Magnesium Standard Dose Replacement - Follow Nurse / BPA Driven Protocol  As Needed         05/29/24 0418    05/29/24 0415  midodrine (PROAMATINE) tablet 5 mg  Once         05/29/24 0319    05/29/24 0350  Magnesium  Once         05/29/24 0350    05/29/24 0246  STAT Lactic Acid, Reflex  PROCEDURE ONCE         05/29/24 0032    05/29/24 0245  albumin human 25 % IV SOLN 25 g  Once         05/29/24 0150    05/29/24 0145  potassium chloride (KLOR-CON M20) CR tablet 40 mEq  Every 4 Hours         05/29/24 0048     05/29/24 0130  sodium chloride 0.9 % infusion  Continuous         05/29/24 0035    05/29/24 0130  sodium chloride 0.9 % bolus 1,000 mL  Once         05/29/24 0035    05/29/24 0103  Urine Culture - Urine, Urine, Clean Catch  Once         05/29/24 0102    05/29/24 0054  Urinalysis, Microscopic Only - Urine, Clean Catch  Once         05/29/24 0053    05/29/24 0051  Fentanyl, Urine - Urine, Clean Catch  Once         05/29/24 0050    05/29/24 0000  Vital Signs  Every 4 Hours       05/28/24 2050 05/28/24 2305  STAT Lactic Acid, Reflex  PROCEDURE ONCE         05/28/24 2046 05/28/24 2220  Gastrointestinal Panel, PCR - Stool, Per Rectum  Once         05/28/24 2219 05/28/24 2220  US Venous Doppler Lower Extremity Bilateral (duplex)  1 Time Imaging         05/28/24 2219 05/28/24 2200  Enoxaparin Sodium (LOVENOX) syringe 40 mg  Every 24 Hours         05/28/24 2105 05/28/24 2145  montelukast (SINGULAIR) tablet 10 mg  Nightly         05/28/24 2050 05/28/24 2145  sodium chloride 0.9 % flush 10 mL  Every 12 Hours Scheduled         05/28/24 2050 05/28/24 2112  Inpatient Case Management  Consult  Once        Provider:  (Not yet assigned)    05/28/24 2112 05/28/24 2112  Wound Ostomy Eval & Treat  Once         05/28/24 2112 05/28/24 2052  Inpatient Podiatry Consult  Once        Specialty:  Podiatry  Provider:  Sridevi Boland DPM    05/28/24 2051 05/28/24 2051  Pharmacy to dose vancomycin  Continuous PRN         05/28/24 2051 05/28/24 2051  Intake & Output  Every Shift       05/28/24 2050 05/28/24 2051  Weigh Patient  Once         05/28/24 2050 05/28/24 2051  Insert Peripheral IV  Once         05/28/24 2050 05/28/24 2051  Saline Lock & Maintain IV Access  Continuous,   Status:  Canceled         05/28/24 2050 05/28/24 2051  Code Status and Medical Interventions:  Continuous         05/28/24 2050 05/28/24 2051  Continuous Cardiac Monitoring  Continuous        Comments:  "Follow Standing Orders As Outlined in Process Instructions (Open Order Report to View Full Instructions)    05/28/24 2050 05/28/24 2051  Maintain IV Access  Continuous         05/28/24 2050 05/28/24 2051  Telemetry - Place Orders & Notify Provider of Results When Patient Experiences Acute Chest Pain, Dysrhythmia or Respiratory Distress  Continuous        Comments: Open Order Report to View Parameters Requiring Provider Notification    05/28/24 2051 05/28/24 2051  May Be Off Telemetry for Tests  Continuous         05/28/24 2051 05/28/24 2051  Diet: Regular/House; Fluid Consistency: Thin (IDDSI 0)  Diet Effective Now         05/28/24 2051 05/28/24 2050  aluminum-magnesium hydroxide-simethicone (MAALOX MAX) 400-400-40 MG/5ML suspension 15 mL  Every 6 Hours PRN         05/28/24 2051 05/28/24 2050  ondansetron ODT (ZOFRAN-ODT) disintegrating tablet 4 mg  Every 6 Hours PRN        Placed in \"Or\" Linked Group    05/28/24 2051 05/28/24 2050  ondansetron (ZOFRAN) injection 4 mg  Every 6 Hours PRN        Placed in \"Or\" Linked Group    05/28/24 2051 05/28/24 2050  diphenhydrAMINE (BENADRYL) capsule 25 mg  Nightly PRN         05/28/24 2051 05/28/24 2050  acetaminophen (TYLENOL) tablet 650 mg  Every 4 Hours PRN        Placed in \"Or\" Linked Group    05/28/24 2051 05/28/24 2050  acetaminophen (TYLENOL) 160 MG/5ML oral solution 650 mg  Every 4 Hours PRN        Placed in \"Or\" Linked Group    05/28/24 2051 05/28/24 2050  acetaminophen (TYLENOL) suppository 650 mg  Every 4 Hours PRN        Placed in \"Or\" Linked Group    05/28/24 2051 05/28/24 2050  sennosides-docusate (PERICOLACE) 8.6-50 MG per tablet 2 tablet  2 Times Daily PRN        Placed in \"And\" Linked Group    05/28/24 2051 05/28/24 2050  polyethylene glycol (MIRALAX) packet 17 g  Daily PRN        Placed in \"And\" Linked Group    05/28/24 2051 05/28/24 2050  bisacodyl (DULCOLAX) EC tablet 5 mg  Daily PRN        Placed in \"And\" Linked " "Group    05/28/24 2051 05/28/24 2050  bisacodyl (DULCOLAX) suppository 10 mg  Daily PRN        Placed in \"And\" Linked Group    05/28/24 2051 05/28/24 2050  nitroglycerin (NITROSTAT) SL tablet 0.4 mg  Every 5 Minutes PRN         05/28/24 2050 05/28/24 2050  Pharmacy to Dose enoxaparin (LOVENOX)  Continuous PRN         05/28/24 2050 05/28/24 2050  sodium chloride 0.9 % flush 10 mL  As Needed         05/28/24 2050 05/28/24 2050  sodium chloride 0.9 % infusion 40 mL  As Needed         05/28/24 2050 05/28/24 2049  cyclobenzaprine (FLEXERIL) tablet 5 mg  3 Times Daily PRN         05/28/24 2050 05/28/24 1945  STAT Lactic Acid, Reflex  PROCEDURE ONCE         05/28/24 1726    05/28/24 1915  vancomycin 1250 mg/250 mL 0.9% NS IVPB ([facility])  Once         05/28/24 1859 05/28/24 1915  piperacillin-tazobactam (ZOSYN) IVPB 3.375 g IVPB in 100 mL NS (VTB)  Once         05/28/24 1859 05/28/24 1855  Cardiac Monitoring  Continuous,   Status:  Canceled        Comments: Follow Standing Orders As Outlined in Process Instructions (Open Order Report to View Full Instructions)    05/28/24 1854 05/28/24 1854  Initiate Observation Status  Once         05/28/24 1854    05/28/24 1852  Urinalysis With Culture If Indicated - Urine, Clean Catch  Once         05/28/24 1851    05/28/24 1852  TSH  Once         05/28/24 1851    05/28/24 1852  Hemoglobin A1c  Once         05/28/24 1851    05/28/24 1846  magnesium sulfate 2g/50 mL (PREMIX) infusion  Once         05/28/24 1830    05/28/24 1838  Urine Drug Screen - Urine, Clean Catch  Once         05/28/24 1837    05/28/24 1838  Ethanol  Once         05/28/24 1837    05/28/24 1838  Wound Culture - Wound, Foot, Left  Once         05/28/24 1838    05/28/24 1834  potassium chloride (MICRO-K/KLOR-CON) CR capsule  Once         05/28/24 1818 05/28/24 1834  potassium chloride 10 mEq in 100 mL IVPB  Once         05/28/24 1818 05/28/24 1833  Critical Care  Once        Comments: This " order was created via procedure documentation    05/28/24 1832    05/28/24 1823  ECG 12 Lead Electrolyte Imbalance  Once         05/28/24 1822    05/28/24 1822  Potassium Replacement - Follow Nurse / BPA Driven Protocol  As Needed         05/28/24 1822    05/28/24 1819  Phosphorus  STAT         05/28/24 1818    05/28/24 1817  Potassium Replacement - Follow Nurse / BPA Driven Protocol  As Needed         05/28/24 1817    05/28/24 1758  Magnesium  Once         05/28/24 1617    05/28/24 1726  Comprehensive Metabolic Panel  Once         05/28/24 1617    05/28/24 1652  Blood Culture With NEMO - Blood, Arm, Left  Once         05/28/24 1651    05/28/24 1652  Blood Culture With NEMO - Blood, Hand, Left  Once         05/28/24 1651    05/28/24 1633  cefTRIAXone (ROCEPHIN) 2,000 mg in sodium chloride 0.9 % 100 mL IVPB-VTB  Once         05/28/24 1617    05/28/24 1633  sodium chloride 0.9 % bolus 1,000 mL  Once         05/28/24 1617    05/28/24 1619  XR Foot 3+ View Left  1 Time Imaging         05/28/24 1618    05/28/24 1619  Sedimentation Rate  STAT         05/28/24 1618    05/28/24 1615  CBC & Differential  Once         05/28/24 1617    05/28/24 1615  Lactic Acid, Plasma  Once         05/28/24 1617    05/28/24 1615  Procalcitonin  Once         05/28/24 1617    05/28/24 1615  C-reactive Protein  Once         05/28/24 1617    05/28/24 1615  Blood Culture - Blood,  Once,   Status:  Canceled         05/28/24 1617    05/28/24 1615  Blood Culture - Blood,  Once,   Status:  Canceled         05/28/24 1617    05/28/24 1615  CBC Auto Differential  PROCEDURE ONCE         05/28/24 1618    --  pseudoephedrine (SUDAFED) 120 MG 12 hr tablet  Every 12 Hours         05/28/24 2012    --  cyclobenzaprine (FLEXERIL) 5 MG tablet  3 Times Daily PRN         05/28/24 2012    --  fluticasone (FLONASE) 50 MCG/ACT nasal spray  Daily         05/28/24 2012                  Physician Progress Notes (most recent note)    No notes of this type exist for this  encounter.       Consult Notes (most recent note)    No notes of this type exist for this encounter.

## 2024-05-29 NOTE — PLAN OF CARE
Problem: Adult Inpatient Plan of Care  Goal: Plan of Care Review  Outcome: Ongoing, Progressing  Flowsheets (Taken 5/29/2024 0424)  Progress: no change  Plan of Care Reviewed With: patient  Outcome Evaluation: Patient hypotensive overnight, see orders. Potassium and magnesium replaced per protocol. Patient having several loose bowel movements overnight, no complaints of pain noted overnight. IVF infusing. Plan of care continued.  Goal: Patient-Specific Goal (Individualized)  Outcome: Ongoing, Progressing  Goal: Absence of Hospital-Acquired Illness or Injury  Outcome: Ongoing, Progressing  Intervention: Identify and Manage Fall Risk  Recent Flowsheet Documentation  Taken 5/29/2024 0225 by Perla Fournier RN  Safety Promotion/Fall Prevention:   safety round/check completed   activity supervised   assistive device/personal items within reach   clutter free environment maintained   fall prevention program maintained   nonskid shoes/slippers when out of bed  Taken 5/29/2024 0024 by Perla Fournier RN  Safety Promotion/Fall Prevention: safety round/check completed  Taken 5/28/2024 2200 by Perla Fournier RN  Safety Promotion/Fall Prevention:   safety round/check completed   assistive device/personal items within reach   clutter free environment maintained   fall prevention program maintained   nonskid shoes/slippers when out of bed  Taken 5/28/2024 1953 by Perla Fournier RN  Safety Promotion/Fall Prevention:   activity supervised   assistive device/personal items within reach   clutter free environment maintained   nonskid shoes/slippers when out of bed   safety round/check completed  Intervention: Prevent Skin Injury  Recent Flowsheet Documentation  Taken 5/29/2024 0225 by Perla Fournier RN  Body Position:   position changed independently   side-lying  Taken 5/29/2024 0024 by Perla Fournier RN  Body Position:   position changed independently   side-lying  Taken 5/28/2024 2200 by Perla Fournier  RN  Body Position: supine, legs elevated  Taken 5/28/2024 1953 by Perla Fournier RN  Body Position: sitting up in bed  Intervention: Prevent and Manage VTE (Venous Thromboembolism) Risk  Recent Flowsheet Documentation  Taken 5/29/2024 0225 by Perla Fournier RN  Activity Management: bedrest  Taken 5/29/2024 0024 by Perla Fournier RN  Activity Management: activity encouraged  Taken 5/28/2024 2200 by Perla Fournier RN  Activity Management: activity encouraged  Taken 5/28/2024 1953 by Perla Fournier RN  Activity Management: activity encouraged  VTE Prevention/Management:   bilateral   sequential compression devices off   patient refused intervention  Range of Motion: active ROM (range of motion) encouraged  Intervention: Prevent Infection  Recent Flowsheet Documentation  Taken 5/29/2024 0225 by Perla Fournier RN  Infection Prevention:   environmental surveillance performed   hand hygiene promoted   rest/sleep promoted  Taken 5/29/2024 0024 by Perla Fournier RN  Infection Prevention:   environmental surveillance performed   single patient room provided  Taken 5/28/2024 1953 by Perla Fournier RN  Infection Prevention:   environmental surveillance performed   rest/sleep promoted   single patient room provided  Goal: Optimal Comfort and Wellbeing  Outcome: Ongoing, Progressing  Intervention: Provide Person-Centered Care  Recent Flowsheet Documentation  Taken 5/28/2024 1953 by Perla Fournier RN  Trust Relationship/Rapport:   care explained   choices provided   reassurance provided   thoughts/feelings acknowledged  Goal: Readiness for Transition of Care  Outcome: Ongoing, Progressing  Intervention: Mutually Develop Transition Plan  Recent Flowsheet Documentation  Taken 5/28/2024 2003 by Perla Fournier RN  Transportation Anticipated: family or friend will provide  Patient/Family Anticipated Services at Transition:   Patient/Family Anticipates Transition to:   home with  help/services   home  Taken 5/28/2024 2002 by Perla Fournier, RN  Equipment Currently Used at Home: none   Goal Outcome Evaluation:  Plan of Care Reviewed With: patient        Progress: no change  Outcome Evaluation: Patient hypotensive overnight, see orders. Potassium and magnesium replaced per protocol. Patient having several loose bowel movements overnight, no complaints of pain noted overnight. IVF infusing. Plan of care continued.

## 2024-05-29 NOTE — NURSING NOTE
Seen for wound consult. Pleasant and cooperative with assessment. Care discussed with [employer].   Left ankle wound from tripping over a dog leash. Reports could not tell any difference in wound when was taking antibiotics. Also states it itches a lot but she has resisted the urge to scratch. Orders to Clean with wound cleanser, barrier wipe to periwound skin, multidex gel to wound, cover with border dressing daily.   Complaints of dry skin. Orders to apply nourishing skin cream to dry skin daily, may leave at bedside.   Staff to contact provider and re-consult wound nurse for new skin issues or lack of improvement with current orders. Thank you for the consult. If you have questions or concerns do not hesitate to contact me.

## 2024-05-29 NOTE — PROGRESS NOTES
[facility] HOSPITALIST    PROGRESS NOTE    Name:  Nicole Silva   Age:  58 y.o.  Sex:  female  :  1965  MRN:  [id number]   Visit Number:  [id number]  Admission Date:  2024  Date Of Service:  24  Primary Care Physician:  Aleksandra Andres APRN     LOS: 0 days :    Chief Complaint:      Left foot wound    Subjective:    Patient seen and examined.  Has had diarrhea for the past 1 month.  Denied associated fever or abdominal pain.  Is not eating well due to this.  Also had recent passing of ex- which has been difficult.  Denied alcohol use.    Hospital Course:    58-year-old with pertinent past medical history of hypertension, COPD, who presented to the emergency room with complaint of a wound on her left foot and ankle.  Onset 1 month ago. She was at her brother's house when she tripped over a dog, ended up with a laceration to the left foot/ankle.  She did not seek emergency care at the time.  She noted that she ended up going to the emergency room about a week later after as it was not healing up and was placed on antibiotic that she completed about 2 weeks ago.  She was also given mupirocin ointment, and instructed on wound care with Betadine. Otherwise she admits to some swelling of her lower extremities over the last 3 to 4 months.  She has not seen a primary care provider in quite some time.  She has lost some weight since last year.  Denies any alcohol or drug use.  Did also endorse severe diarrhea over the past 1 month which has worsened p.o. intake.  Recent death of ex- has also been difficult.     In the ER she was overall hemodynamically stable.  She was noted to have hypokalemia, hypocalcemia, hypomagnesemia, hypoalbuminemia, elevated lactic acid, procalcitonin 0.26, and a white count of 12,000 hemoglobin 9.7.  X-ray of the left foot did not show any bony abnormality.  Due to concern for infection of the left foot hospitalist consulted for further  medical management.  Wound consult placed.  Patient noted to have norovirus on GI panel.  Electrolyte replacement initiated per protocol.    Review of Systems:     All systems were reviewed and negative except as mentioned in subjective, assessment and plan.    Vital Signs:    Temp:  [98.3 °F (36.8 °C)-98.9 °F (37.2 °C)] 98.3 °F (36.8 °C)  Heart Rate:  [] 103  Resp:  [18] 18  BP: ()/(38-68) 97/55    Intake and output:    I/O last 3 completed shifts:  In: 1220 [P.O.:120; IV Piggyback:1100]  Out: 200 [Urine:200]  I/O this shift:  In: 578 [P.O.:578]  Out: -     Physical Examination:    General Appearance:  Alert and cooperative.  Chronically ill middle-aged female.  No acute distress noted.   Head:  Atraumatic and normocephalic.   Eyes: Conjunctivae and sclerae normal, no icterus. No pallor.   Throat: No oral lesions, no thrush, oral mucosa moist.   Neck: Supple, trachea midline, no thyromegaly.   Lungs:   Breath sounds heard bilaterally equally.  No wheezing or crackles. No Pleural rub or bronchial breathing.  On room air unlabored.   Heart:  Normal S1 and S2, no murmur, no gallop, no rub. No JVD.   Abdomen:   Normal bowel sounds, no masses, no organomegaly. Soft, nontender, nondistended, no rebound tenderness.   Extremities: Supple, bilateral lower extremity edema, no cyanosis, no clubbing.   Skin: No bleeding or rash.  Dry skin noted.  Left foot wound noted with granulation tissue.  No obvious odor, drainage, or erythema noted.   Neurologic: Alert and oriented x 3. No facial asymmetry. Moves all four limbs. No tremors.  Generalized weakness.     Laboratory results:    Results from last 7 days   Lab Units 05/29/24  0300 05/28/24  1743   SODIUM mmol/L 138 139   POTASSIUM mmol/L 2.5* 2.5*   CHLORIDE mmol/L 100 98   CO2 mmol/L 24.7 27.8   BUN mg/dL 13 15   CREATININE mg/dL 0.74 0.75   CALCIUM mg/dL 7.0* 7.5*   BILIRUBIN mg/dL 0.4 0.2   ALK PHOS U/L 238* 231*   ALT (SGPT) U/L 9 9   AST (SGOT) U/L 50* 38*  "  GLUCOSE mg/dL 93 94     Results from last 7 days   Lab Units 05/29/24  0300 05/28/24  1645   WBC 10*3/mm3  --  12.86*   HEMOGLOBIN g/dL 8.1* 9.7*   HEMATOCRIT % 24.7* 29.0*   PLATELETS 10*3/mm3  --  219             Results from last 7 days   Lab Units 05/28/24  1650 05/28/24  1640   BLOODCX  No growth at less than 24 hours No growth at less than 24 hours     No results for input(s): \"PHART\", \"MYS7CIH\", \"PO2ART\", \"NNF8BKK\", \"BASEEXCESS\" in the last 8760 hours.   I have reviewed the patient's laboratory results.    Radiology results:    XR Foot 3+ View Left    Result Date: 5/28/2024  PROCEDURE: XR FOOT 3+ VW LEFT-  HISTORY: large wound on lateral left ankle, concern for osteomyelitis  COMPARISON: None.  FINDINGS:  A three view exam demonstrates no acute fracture or dislocation. The joint spaces appear unremarkable. No radiopaque foreign body identified in the soft tissues. There appears to be mild soft tissue swelling adjacent to the lateral malleolus.      Impression: No acute bony abnormality.      This report was signed and finalized on 5/28/2024 4:47 PM by Amna Segovia MD.     I have reviewed the patient's radiology reports.    Medication Review:     I have reviewed the patient's active and prn medications.     Problem List:      Cellulitis of left foot    Lactic acidosis    Hypokalemia    Hypomagnesemia      Assessment:    Nonhealing left foot laceration/cellulitis, POA  Acute gastroenteritis due to norovirus, POA  Hypokalemia  Hypomagnesemia  Lactic acidosis  Hypoalbuminemia  Lower extremity edema    Plan:    Left foot laceration/cellulitis  -Wound RN consulted.  No obvious infection noted.  -Will continue Rocephin/vanc for now. MRSA PCR pending.  -Likely delayed healing due to no initial treatment.  - Podiatry consulted.    Multiple electrolyte derangements  -Likely secondary to norovirus with poor p.o. intake.  -Replace per protocol.    Norovirus  -Continue supportive care with IV fluids as appears she has " volume depletion.  - De-escalate antibiotics as able.    Lactic acidosis  -Improved.  No obvious signs of sepsis currently.    Hypoalbuminemia/lower extremity edema/poor p.o. intake  -Nutrition consult.  -Lower extremity Dopplers completed with read pending.  -Question underlying alcohol use.  Monitor closely for withdrawal symptoms.    I have reviewed the copied text and it is accurate as of 5/29/2024       DVT Prophylaxis: Enoxaparin  Code Status: Full code  Diet: As tolerated  Discharge Plan: Home 1 to 2 days.    Carolyn Rojas, APRN  05/29/24  10:52 EDT    Dictated utilizing Dragon dictation.

## 2024-05-30 ENCOUNTER — APPOINTMENT (OUTPATIENT)
Dept: ULTRASOUND IMAGING | Facility: HOSPITAL | Age: 59
End: 2024-05-30
Payer: MEDICAID

## 2024-05-30 LAB
ANION GAP SERPL CALCULATED.3IONS-SCNC: 9.5 MMOL/L (ref 5–15)
BACTERIA SPEC AEROBE CULT: ABNORMAL
BASOPHILS # BLD AUTO: 0.06 10*3/MM3 (ref 0–0.2)
BASOPHILS NFR BLD AUTO: 0.8 % (ref 0–1.5)
BUN SERPL-MCNC: 9 MG/DL (ref 6–20)
BUN/CREAT SERPL: 13.4 (ref 7–25)
CALCIUM SPEC-SCNC: 7.2 MG/DL (ref 8.6–10.5)
CHLORIDE SERPL-SCNC: 108 MMOL/L (ref 98–107)
CO2 SERPL-SCNC: 23.5 MMOL/L (ref 22–29)
CREAT SERPL-MCNC: 0.67 MG/DL (ref 0.57–1)
DEPRECATED RDW RBC AUTO: 51.6 FL (ref 37–54)
EGFRCR SERPLBLD CKD-EPI 2021: 102.1 ML/MIN/1.73
EOSINOPHIL # BLD AUTO: 0.17 10*3/MM3 (ref 0–0.4)
EOSINOPHIL NFR BLD AUTO: 2.3 % (ref 0.3–6.2)
ERYTHROCYTE [DISTWIDTH] IN BLOOD BY AUTOMATED COUNT: 13.9 % (ref 12.3–15.4)
GLUCOSE SERPL-MCNC: 107 MG/DL (ref 65–99)
HCT VFR BLD AUTO: 23.7 % (ref 34–46.6)
HGB BLD-MCNC: 7.4 G/DL (ref 12–15.9)
IMM GRANULOCYTES # BLD AUTO: 0.03 10*3/MM3 (ref 0–0.05)
IMM GRANULOCYTES NFR BLD AUTO: 0.4 % (ref 0–0.5)
LYMPHOCYTES # BLD AUTO: 0.91 10*3/MM3 (ref 0.7–3.1)
LYMPHOCYTES NFR BLD AUTO: 12.2 % (ref 19.6–45.3)
MAGNESIUM SERPL-MCNC: 1.8 MG/DL (ref 1.6–2.6)
MCH RBC QN AUTO: 31.6 PG (ref 26.6–33)
MCHC RBC AUTO-ENTMCNC: 31.2 G/DL (ref 31.5–35.7)
MCV RBC AUTO: 101.3 FL (ref 79–97)
MONOCYTES # BLD AUTO: 0.5 10*3/MM3 (ref 0.1–0.9)
MONOCYTES NFR BLD AUTO: 6.7 % (ref 5–12)
NEUTROPHILS NFR BLD AUTO: 5.76 10*3/MM3 (ref 1.7–7)
NEUTROPHILS NFR BLD AUTO: 77.6 % (ref 42.7–76)
NRBC BLD AUTO-RTO: 0 /100 WBC (ref 0–0.2)
PLATELET # BLD AUTO: 126 10*3/MM3 (ref 140–450)
PMV BLD AUTO: 11.2 FL (ref 6–12)
POTASSIUM SERPL-SCNC: 4 MMOL/L (ref 3.5–5.2)
POTASSIUM SERPL-SCNC: 4 MMOL/L (ref 3.5–5.2)
RBC # BLD AUTO: 2.34 10*6/MM3 (ref 3.77–5.28)
SODIUM SERPL-SCNC: 141 MMOL/L (ref 136–145)
WBC NRBC COR # BLD AUTO: 7.43 10*3/MM3 (ref 3.4–10.8)

## 2024-05-30 PROCEDURE — 25810000003 SODIUM CHLORIDE 0.9 % SOLUTION: Performed by: NURSE PRACTITIONER

## 2024-05-30 PROCEDURE — 99232 SBSQ HOSP IP/OBS MODERATE 35: CPT | Performed by: NURSE PRACTITIONER

## 2024-05-30 PROCEDURE — 25810000003 SODIUM CHLORIDE 0.9 % SOLUTION: Performed by: FAMILY MEDICINE

## 2024-05-30 PROCEDURE — 25810000003 SODIUM CHLORIDE 0.9 % SOLUTION: Performed by: STUDENT IN AN ORGANIZED HEALTH CARE EDUCATION/TRAINING PROGRAM

## 2024-05-30 PROCEDURE — 25010000002 VANCOMYCIN 5 G RECONSTITUTED SOLUTION: Performed by: STUDENT IN AN ORGANIZED HEALTH CARE EDUCATION/TRAINING PROGRAM

## 2024-05-30 PROCEDURE — 93926 LOWER EXTREMITY STUDY: CPT

## 2024-05-30 PROCEDURE — 25010000002 CEFTRIAXONE PER 250 MG: Performed by: FAMILY MEDICINE

## 2024-05-30 PROCEDURE — 80048 BASIC METABOLIC PNL TOTAL CA: CPT | Performed by: NURSE PRACTITIONER

## 2024-05-30 PROCEDURE — 85025 COMPLETE CBC W/AUTO DIFF WBC: CPT | Performed by: NURSE PRACTITIONER

## 2024-05-30 PROCEDURE — G0378 HOSPITAL OBSERVATION PER HR: HCPCS

## 2024-05-30 PROCEDURE — 84132 ASSAY OF SERUM POTASSIUM: CPT | Performed by: STUDENT IN AN ORGANIZED HEALTH CARE EDUCATION/TRAINING PROGRAM

## 2024-05-30 PROCEDURE — 96361 HYDRATE IV INFUSION ADD-ON: CPT

## 2024-05-30 RX ORDER — MULTIPLE VITAMINS W/ MINERALS TAB 9MG-400MCG
1 TAB ORAL DAILY
Status: DISCONTINUED | OUTPATIENT
Start: 2024-05-30 | End: 2024-05-31 | Stop reason: HOSPADM

## 2024-05-30 RX ORDER — FOLIC ACID 1 MG/1
1 TABLET ORAL DAILY
Status: DISCONTINUED | OUTPATIENT
Start: 2024-05-30 | End: 2024-05-31 | Stop reason: HOSPADM

## 2024-05-30 RX ORDER — LOPERAMIDE HYDROCHLORIDE 2 MG/1
2 CAPSULE ORAL 4 TIMES DAILY PRN
Status: DISCONTINUED | OUTPATIENT
Start: 2024-05-30 | End: 2024-05-31 | Stop reason: HOSPADM

## 2024-05-30 RX ADMIN — CEFTRIAXONE SODIUM 1000 MG: 1 INJECTION, POWDER, FOR SOLUTION INTRAMUSCULAR; INTRAVENOUS at 09:39

## 2024-05-30 RX ADMIN — MONTELUKAST 10 MG: 10 TABLET, FILM COATED ORAL at 20:22

## 2024-05-30 RX ADMIN — COLLAGENASE SANTYL 1 APPLICATION: 250 OINTMENT TOPICAL at 09:39

## 2024-05-30 RX ADMIN — FLUTICASONE PROPIONATE 2 SPRAY: 50 SPRAY, METERED NASAL at 09:39

## 2024-05-30 RX ADMIN — Medication 1 TABLET: at 12:07

## 2024-05-30 RX ADMIN — LOPERAMIDE HYDROCHLORIDE 2 MG: 2 CAPSULE ORAL at 09:39

## 2024-05-30 RX ADMIN — SODIUM CHLORIDE 150 ML/HR: 9 INJECTION, SOLUTION INTRAVENOUS at 04:02

## 2024-05-30 RX ADMIN — THIAMINE HCL TAB 100 MG 100 MG: 100 TAB at 12:07

## 2024-05-30 RX ADMIN — Medication 10 ML: at 09:39

## 2024-05-30 RX ADMIN — Medication: at 09:39

## 2024-05-30 RX ADMIN — SODIUM CHLORIDE 100 ML/HR: 9 INJECTION, SOLUTION INTRAVENOUS at 12:06

## 2024-05-30 RX ADMIN — VANCOMYCIN HYDROCHLORIDE 1250 MG: 5 INJECTION, POWDER, LYOPHILIZED, FOR SOLUTION INTRAVENOUS at 04:01

## 2024-05-30 RX ADMIN — LOPERAMIDE HYDROCHLORIDE 2 MG: 2 CAPSULE ORAL at 20:26

## 2024-05-30 RX ADMIN — ATORVASTATIN CALCIUM 10 MG: 10 TABLET, FILM COATED ORAL at 09:39

## 2024-05-30 RX ADMIN — Medication 10 ML: at 20:22

## 2024-05-30 RX ADMIN — ALBUTEROL SULFATE 2 PUFF: 90 AEROSOL, METERED RESPIRATORY (INHALATION) at 05:32

## 2024-05-30 RX ADMIN — FOLIC ACID 1 MG: 1 TABLET ORAL at 12:07

## 2024-05-30 NOTE — CONSULTS
Inpatient Podiatry Consult  Consult performed by: Sridevi Boland DPM  Consult ordered by: Pat Alatorre DO          Patient Identification:  Name:  Nicole Silva  Age:  58 y.o.  Sex:  female  :  1965  MRN:  [id number]  Visit Number:  [id number]  Primary care provider:  Aleksandra Andres APRN    Chief complaint: left ankle wound    History of presenting illness:  58-year-old with a an apparent history of hypertension, COPD, who presented to the emergency room yesterday with complaint of a wound on her left foot and ankle. About a month ago she tripped and got a laceration to the front of the left ankle. She attempted to treat this at home for about two weeks when at that point she went to the ED and was given oral antibiotics and instructed on at home wound care. She presented to the ED once again due to concerns of the wound not healing as well as redness and swelling to the leg.  ---------------------------------------------------------------------------------------------------------------------  Review of Systems:   Constitution: No chills, no rigors, no unexplained weight loss or weight gain  Respiratory: No cough, no hemoptysis  Cardiovascular: regular rate and rhythm  Gastrointestinal: No nausea, no vomiting, no hematemesis, no diarrhea or constipation, no melena  Integument: No pruritis and  no skin rash  Musculoskeletal: No joint pain, joint stiffness, joint swelling, muscle pain, muscle weakness and neck pain  Neurological: No dizziness, headaches, light headedness, seizures and vertigo  Endocrine: No frequent urination and nocturia, temperature intolerance, weight gain, unintended and weight loss, unintended  ---------------------------------------------------------------------------------------------------------------------   Past History:  Family History   Problem Relation Age of Onset    Ovarian cancer Mother     Breast cancer Neg Hx      Past Medical History:   Diagnosis  Date    COPD (chronic obstructive pulmonary disease)     Hypertension      Past Surgical History:   Procedure Laterality Date     SECTION  ,     Social History     Socioeconomic History    Marital status:    Tobacco Use    Smoking status: Former     Types: Cigarettes     Passive exposure: Past    Smokeless tobacco: Never   Vaping Use    Vaping status: Never Used   Substance and Sexual Activity    Alcohol use: No    Drug use: No    Sexual activity: Yes     Partners: Male     Birth control/protection: Post-menopausal     ---------------------------------------------------------------------------------------------------------------------   Allergies:  Patient has no known allergies.  ---------------------------------------------------------------------------------------------------------------------   Prior to Admission Medications       Prescriptions Last Dose Informant Patient Reported? Taking?    albuterol 108 (90 Base) MCG/ACT inhaler 2024  Yes Yes    Inhale.    atorvastatin (LIPITOR) 10 MG tablet 2024  Yes Yes    Take 1 tablet by mouth Daily.    cyclobenzaprine (FLEXERIL) 5 MG tablet 2024  Yes Yes    Take 1 tablet by mouth 3 (Three) Times a Day As Needed for Muscle Spasms.    fluticasone (FLONASE) 50 MCG/ACT nasal spray 2024  Yes Yes    2 sprays into the nostril(s) as directed by provider Daily.    lisinopril (PRINIVIL,ZESTRIL) 10 MG tablet 2024  Yes Yes    Take 1 tablet by mouth Daily.    montelukast (SINGULAIR) 10 MG tablet 2024  No Yes    Take 1 tablet by mouth Every Night.    Multivitamin tablet tablet 2024  Yes Yes    pseudoephedrine (SUDAFED) 120 MG 12 hr tablet 2024  Yes Yes    Take 1 tablet by mouth Every 12 (Twelve) Hours.    vitamin D (ERGOCALCIFEROL) 1.25 MG (21338 UT) capsule capsule 2024  Yes Yes    fluticasone (FLOVENT HFA) 110 MCG/ACT inhaler   No No    Inhale 1 puff twice daily.  Rinse mouth well after using.    levocetirizine  (XYZAL) 5 MG tablet   No No    Take 1 tablet by mouth Every Evening for 30 days.    methylPREDNISolone (MEDROL) 4 MG dose pack   No No    Take as directed on package instructions.    progesterone (PROMETRIUM) 100 MG capsule   No No    TAKE ONE CAPSULE BY MOUTH DAILY    Progesterone (PROMETRIUM) 100 MG capsule   Yes No    Take 1 capsule by mouth Every Night.    venlafaxine XR (EFFEXOR-XR) 75 MG 24 hr capsule   Yes No    Take 2 capsules by mouth.          [facility] Meds:  atorvastatin, 10 mg, Oral, Daily  cefTRIAXone, 1,000 mg, Intravenous, Q24H  cetaphil, , Topical, Daily  enoxaparin, 40 mg, Subcutaneous, Q24H  fluticasone, 2 spray, Nasal, Daily  montelukast, 10 mg, Oral, Nightly  sodium chloride, 10 mL, Intravenous, Q12H  vancomycin, 1,250 mg, Intravenous, Q18H      Pharmacy to Dose enoxaparin (LOVENOX),   Pharmacy to dose vancomycin,   sodium chloride, 150 mL/hr, Last Rate: 150 mL/hr (05/30/24 0402)      ---------------------------------------------------------------------------------------------------------------------   Vital Signs:  Temp:  [96.9 °F (36.1 °C)-98.5 °F (36.9 °C)] 98.3 °F (36.8 °C)  Heart Rate:  [] 107  Resp:  [18-19] 18  BP: ()/(49-70) 86/49      05/28/24 1953 05/28/24 2051 05/30/24 0337   Weight: 66.2 kg (145 lb 15.1 oz) 66.2 kg (145 lb 15.1 oz) 73.8 kg (162 lb 11.2 oz)     Body mass index is 27.93 kg/m².  ---------------------------------------------------------------------------------------------------------------------   Physical exam:  Vascular: 1/4 DP and 0/4 PT right and left. Brisk capillary refill all digits. Diminished hair growth. Skin temperature warm to cool gradient proximal leg to foot. No skin discoloration. Varicosities absent.    Neurological: Vibratory, light touch, and proprioception diminished bilateral feet.    Dermatological:   Ulcer:   Location: anterior left ankle  Size: 8cm x 2.5cm x 0.2cm, negative probe to bone  Base: 80/20 granular/fibrotic  Drainage: mild  "serosanguinous  Periwound: local erythema and edema, no warmth    Musculoskeletal: MMT 5/5 GS/FHL/TA. Pain on palpation left ankle laceration. All compartments soft and compressible. No pain or crepitus with ROM bilateral foot and ankle. Mild Gastrosoleus contracture. Hammertoes 2-5 bilateral foot. No gross deformity of the bilateral lower extremities    ---------------------------------------------------------------------------------------------------------------------   Results from last 7 days   Lab Units 05/30/24 0402 05/29/24  0850 05/29/24 0300 05/28/24  2346 05/28/24 2005 05/28/24  1645   CRP mg/dL  --   --   --   --   --  <0.30   LACTATE mmol/L  --  4.0* 4.0* 7.9*   < > 6.7*   WBC 10*3/mm3 7.43  --   --   --   --  12.86*   HEMOGLOBIN g/dL 7.4*  --  8.1*  --   --  9.7*   HEMATOCRIT % 23.7*  --  24.7*  --   --  29.0*   MCV fL 101.3*  --   --   --   --  96.7   MCHC g/dL 31.2*  --   --   --   --  33.4   PLATELETS 10*3/mm3 126*  --   --   --   --  219    < > = values in this interval not displayed.         Results from last 7 days   Lab Units 05/30/24 0402 05/29/24  2359 05/29/24  1350 05/29/24 0300 05/28/24  1743   SODIUM mmol/L 141  --   --  138 139   POTASSIUM mmol/L 4.0  4.0  --  3.4* 2.5* 2.5*   MAGNESIUM mg/dL  --  1.8  --  1.2* 0.9*   CHLORIDE mmol/L 108*  --   --  100 98   CO2 mmol/L 23.5  --   --  24.7 27.8   BUN mg/dL 9  --   --  13 15   CREATININE mg/dL 0.67  --   --  0.74 0.75   CALCIUM mg/dL 7.2*  --   --  7.0* 7.5*   PHOSPHORUS mg/dL  --   --   --   --  3.1   GLUCOSE mg/dL 107*  --   --  93 94   ALBUMIN g/dL  --   --   --  2.5* 2.1*   BILIRUBIN mg/dL  --   --   --  0.4 0.2   ALK PHOS U/L  --   --   --  238* 231*   AST (SGOT) U/L  --   --   --  50* 38*   ALT (SGPT) U/L  --   --   --  9 9   Estimated Creatinine Clearance: 91.1 mL/min (by C-G formula based on SCr of 0.67 mg/dL).  No results found for: \"AMMONIA\"          Lab Results   Component Value Date    HGBA1C 4.60 (L) 05/28/2024     Lab " "Results   Component Value Date    TSH 2.450 05/28/2024     No results found for: \"PREGTESTUR\", \"PREGSERUM\", \"HCG\", \"HCGQUANT\"  Pain Management Panel          Latest Ref Rng & Units 5/29/2024   Pain Management Panel   Amphetamine, Urine Qual Negative Negative    Barbiturates Screen, Urine Negative Negative    Benzodiazepine Screen, Urine Negative Negative    Buprenorphine, Screen, Urine Negative Negative    Cocaine Screen, Urine Negative Negative    Fentanyl, Urine Negative Negative    Methadone Screen , Urine Negative Negative    Methamphetamine, Ur Negative Negative      Blood Culture   Date Value Ref Range Status   05/28/2024 No growth at 24 hours  Preliminary   05/28/2024 No growth at 24 hours  Preliminary     No results found for: \"URINECX\"  No results found for: \"WOUNDCX\"  No results found for: \"STOOLCX\"      ---------------------------------------------------------------------------------------------------------------------   Imaging Results (Last 7 Days)       Procedure Component Value Units Date/Time    US Venous Doppler Lower Extremity Bilateral (duplex) [[id number]] Resulted: 05/29/24 0930     Updated: 05/29/24 1835    XR Foot 3+ View Left [[id number]] Collected: 05/28/24 1645     Updated: 05/28/24 1649    Narrative:      PROCEDURE: XR FOOT 3+ VW LEFT-     HISTORY: large wound on lateral left ankle, concern for osteomyelitis     COMPARISON: None.     FINDINGS:  A three view exam demonstrates no acute fracture or  dislocation. The joint spaces appear unremarkable. No radiopaque foreign  body identified in the soft tissues. There appears to be mild soft  tissue swelling adjacent to the lateral malleolus.       Impression:      No acute bony abnormality.                 This report was signed and finalized on 5/28/2024 4:47 PM by Amna Segovia MD.             ----------------------------------------------------------------------------------------------------------------------  Assessment and Plan:  58 year old " female with ulceration to the left anterior ankle following laceration approximately one month ago.    -No underlying osteomyelitis  -Arterial doppler ordered due to non-healing wound, non-diabetic    -Santyl daily to wound under sterile bandage. Continue upon discharge.    Patient can follow up as an outpatient in one week. [facility] [phone].      Thank you for the consult.    Sridevi Boland DPM  05/30/24  06:32 EDT

## 2024-05-30 NOTE — PROGRESS NOTES
[facility]    Name:  Nicole Silva   Age:  58 y.o.  Sex:  female  :  1965  MRN:  [id number]   Visit Number:  [id number]  Admission Date:  2024  Date Of Service:  24  Primary Care Physician:  Aleksandra Andres APRN     LOS: 0 days :    Chief Complaint:      Left foot wound    Subjective:    Patient seen and examined.  Has had diarrhea for the past 1 month.  Denied associated fever or abdominal pain.  Is not eating well due to this.  Also had recent passing of ex- which has been difficult.  Admitted to drinking daily for the past 4 years this morning.  Was noted to have tremors during exam.  Thinks that most of diarrhea is likely due to withdrawal from alcohol.  However, has been slowly decreasing alcohol use for the past several weeks.    Hospital Course:    58-year-old with pertinent past medical history of hypertension, COPD, who presented to the emergency room with complaint of a wound on her left foot and ankle.  Onset 1 month ago. She was at her brother's house when she tripped over a dog, ended up with a laceration to the left foot/ankle.  She did not seek emergency care at the time.  She noted that she ended up going to the emergency room about a week later after as it was not healing up and was placed on antibiotic that she completed about 2 weeks ago.  She was also given mupirocin ointment, and instructed on wound care with Betadine. Otherwise she admits to some swelling of her lower extremities over the last 3 to 4 months.  She has not seen a primary care provider in quite some time.  She has lost some weight since last year.  Denies any alcohol or drug use.  Did also endorse severe diarrhea over the past 1 month which has worsened p.o. intake.  Recent death of ex- has also been difficult.     In the ER she was overall hemodynamically stable.  She was noted to have hypokalemia, hypocalcemia, hypomagnesemia, hypoalbuminemia,  elevated lactic acid, procalcitonin 0.26, and a white count of 12,000 hemoglobin 9.7.  X-ray of the left foot did not show any bony abnormality.  Due to concern for infection of the left foot hospitalist consulted for further medical management.  Wound consult placed.  Patient noted to have norovirus on GI panel.  Electrolyte replacement initiated per protocol.  Patient did admit to alcohol use daily for the past 4 years.  Normally drinking vodka about 1/5 over 3 to 4 days.  However, had decreased drinking since the passing of her ex-.  CIWA protocol initiated.  Podiatry did see patient as well.  Podiatry recommended arterial Doppler which is pending and daily Santyl with follow-up in 1 week.  Imodium scheduled for diarrhea.  Patient likely still shedding norovirus, however, current diarrhea likely secondary to withdrawal.    Review of Systems:     All systems were reviewed and negative except as mentioned in subjective, assessment and plan.    Vital Signs:    Temp:  [96.9 °F (36.1 °C)-99.2 °F (37.3 °C)] 99.2 °F (37.3 °C)  Heart Rate:  [] 94  Resp:  [18-19] 18  BP: ()/(49-76) 115/76    Intake and output:    I/O last 3 completed shifts:  In: 1538 [P.O.:1538]  Out: 200 [Urine:200]  I/O this shift:  In: 360 [P.O.:360]  Out: -     Physical Examination:    General Appearance:  Alert and cooperative.  Chronically ill middle-aged female.  No acute distress noted.   Head:  Atraumatic and normocephalic.   Eyes: Conjunctivae and sclerae normal, no icterus. No pallor.   Throat: No oral lesions, no thrush, oral mucosa moist.   Neck: Supple, trachea midline, no thyromegaly.   Lungs:   Breath sounds heard bilaterally equally.  No wheezing or crackles. No Pleural rub or bronchial breathing.  On room air unlabored.   Heart:  Normal S1 and S2, no murmur, no gallop, no rub. No JVD.   Abdomen:   Normal bowel sounds, no masses, no organomegaly. Soft, nontender, nondistended, no rebound tenderness.   Extremities:  "Supple, bilateral lower extremity edema, no cyanosis, no clubbing.   Skin: No bleeding or rash.  Dry skin noted.  Left foot wound noted with granulation tissue.  No obvious odor, drainage, or erythema noted.  Dressing clean dry and intact.   Neurologic: Alert and oriented x 3. No facial asymmetry. Moves all four limbs.  Tremor noted.  Generalized weakness.     Laboratory results:    Results from last 7 days   Lab Units 05/30/24  0402 05/29/24  1350 05/29/24  0300 05/28/24  1743   SODIUM mmol/L 141  --  138 139   POTASSIUM mmol/L 4.0  4.0 3.4* 2.5* 2.5*   CHLORIDE mmol/L 108*  --  100 98   CO2 mmol/L 23.5  --  24.7 27.8   BUN mg/dL 9  --  13 15   CREATININE mg/dL 0.67  --  0.74 0.75   CALCIUM mg/dL 7.2*  --  7.0* 7.5*   BILIRUBIN mg/dL  --   --  0.4 0.2   ALK PHOS U/L  --   --  238* 231*   ALT (SGPT) U/L  --   --  9 9   AST (SGOT) U/L  --   --  50* 38*   GLUCOSE mg/dL 107*  --  93 94     Results from last 7 days   Lab Units 05/30/24  0402 05/29/24  0300 05/28/24  1645   WBC 10*3/mm3 7.43  --  12.86*   HEMOGLOBIN g/dL 7.4* 8.1* 9.7*   HEMATOCRIT % 23.7* 24.7* 29.0*   PLATELETS 10*3/mm3 126*  --  219             Results from last 7 days   Lab Units 05/29/24  0047 05/28/24  2109 05/28/24  1650 05/28/24  1640   BLOODCX   --   --  No growth at 24 hours No growth at 24 hours   URINECX  <25,000 CFU/mL Gram Negative Bacilli*  --   --   --    WOUNDCX   --  No growth  --   --      No results for input(s): \"PHART\", \"CYF7GTP\", \"PO2ART\", \"LNP8MXW\", \"BASEEXCESS\" in the last 8760 hours.   I have reviewed the patient's laboratory results.    Radiology results:    US Arterial Doppler Lower Extremity Left    Result Date: 5/30/2024  Left PROCEDURE: US ARTERIAL DOPPLER LOWER EXTREMITY  LEFT-  ARTERIAL DUPLEX DOPPLER EVALUATION OF THE LEFT LOWER EXTREMITY WITH SPECTRAL ANALYSIS .  HISTORY: non-healing ulcer; L03.116-Cellulitis of left lower limb; E87.6-Hypokalemia; E87.20-Acidosis, unspecified; E83.42-Hypomagnesemia  PROCEDURE:  Doppler " velocity waveform evaluation of the left lower extremity was performed .  FINDINGS:  LEFT LOWER EXTREMITY.      Velocities cm/sec :   CFA: 117 cm/s SFA Mid: 90.9 cm/s SFA Dist: 55.7 cm/s POP: 59.2 cm/s PT: 86.6 cm/s ABBIE: 81.5 cm/s   Waveforms are triphasic and biphasic.       Impression: No significant peripheral vascular disease.     This report was signed and finalized on 5/30/2024 8:30 AM by Amna Segovia MD.      US Venous Doppler Lower Extremity Bilateral (duplex)    Result Date: 5/30/2024  BILATERAL LOWER EXTREMITY VENOUS DUPLEX DOPPLER EXAMINATION  HISTORY: edema; L03.116-Cellulitis of left lower limb; E87.6-Hypokalemia; E87.20-Acidosis, unspecified; E83.42-Hypomagnesemia  COMPARISON:   None  PROCEDURE: Multiple transverse and longitudinal scans were performed of both femoropopliteal deep venous system, with augmentation and compression maneuvers.  FINDINGS: Proper phasic flow was noted in the visualized deep venous system of both lower extremities. No intraluminal increased echogenicity is noted to suggest thrombus. There is proper compression and augmentation of the venous structures. No abnormal venous collaterals are seen.      Impression: No evidence of left or right lower extremity deep venous thrombosis.      This report was signed and finalized on 5/30/2024 7:02 AM by Amna Segovia MD.      XR Foot 3+ View Left    Result Date: 5/28/2024  PROCEDURE: XR FOOT 3+ VW LEFT-  HISTORY: large wound on lateral left ankle, concern for osteomyelitis  COMPARISON: None.  FINDINGS:  A three view exam demonstrates no acute fracture or dislocation. The joint spaces appear unremarkable. No radiopaque foreign body identified in the soft tissues. There appears to be mild soft tissue swelling adjacent to the lateral malleolus.      Impression: No acute bony abnormality.      This report was signed and finalized on 5/28/2024 4:47 PM by Amna Segovia MD.     I have reviewed the patient's radiology reports.    Medication  Review:     I have reviewed the patient's active and prn medications.     Problem List:      Cellulitis of left foot    Lactic acidosis    Hypokalemia    Hypomagnesemia      Assessment:    Nonhealing left foot laceration/cellulitis, POA  Acute gastroenteritis due to norovirus, POA  Hypokalemia  Hypomagnesemia  Lactic acidosis  Alcohol dependence with withdrawal    Plan:    Left foot laceration/cellulitis  -Wound RN consulted.  No obvious infection noted.  -Likely delayed healing due to no initial treatment.  Arterial Doppler pending.  Venous Doppler negative.  - Podiatry consulted.  Recommended outpatient follow-up with daily Santyl.    Multiple electrolyte derangements  -Likely secondary to norovirus with poor p.o. intake with underlying alcohol dependence.  -Replace per protocol.    Norovirus  -Continue supportive care with IV fluids as appears she has volume depletion.  - De-escalate antibiotics as able.    Hypoalbuminemia/lower extremity edema/poor p.o. intake/alcohol dependence  -Nutrition consult.  -Will order multivitamin/folate/thiamine.  CIWA protocol initiated.  Patient does not want Ativan at this time.    I have reviewed the copied text and it is accurate as of 5/30/2024       DVT Prophylaxis: Enoxaparin  Code Status: Full code  Diet: As tolerated  Discharge Plan: Home tomorrow.    Carolyn Rojas, APRN  05/30/24  11:05 EDT    Dictated utilizing Dragon dictation.

## 2024-05-30 NOTE — PROGRESS NOTES
"Pharmacy Consult - Vancomycin Dosing    Pharmacy was consulted to dose vancomycin for  Nicole Silva, a 58 y.o. female  162.6 cm (64\") 73.8 kg (162 lb 11.2 oz)    Indication: Sepsis, SSTI  Consulting Provider: Dr. Kerley    Goal AUC: 400-600 mg/L*hr.    Labs  Results from last 7 days   Lab Units 05/30/24  0402 05/29/24  0300 05/28/24  1743 05/28/24  1645   WBC 10*3/mm3 7.43  --   --  12.86*   CREATININE mg/dL 0.67 0.74 0.75  --       Estimated Creatinine Clearance: 91.1 mL/min (by C-G formula based on SCr of 0.67 mg/dL).  Temp Readings from Last 1 Encounters:   05/30/24 99.2 °F (37.3 °C) (Oral)       Other Antimicrobials    Ceftriaxone 1 g IV every 24 hours     InsightRX AUC Calculation    Current dose: 1250 mg IV every 18 hours   Predicted Steady State AUC on Current Dose: 406 mg/L*hr    New dose: 1000 mg IV every 12 hours   Predicted Steady State AUC on New Dose: 484 mg/L*hr    Assessment/Plan    Patient currently receiving vancomycin 1250 mg IV every 18 hours with an expected steady state .  Current dose predicts an AUC at low end of goal. Will optimize vancomycin dose to 1000 mg IV every 12 hours to attain a target AUC closer to midpoint of goal range.  Assess clearance by vancomycin level on 5/31 @ 0300.  Pharmacy will continue to monitor renal function, cultures and sensitivities, and clinical status to adjust regimen as necessary.      Thank you,  Stephanie Stephens, PharmD  05/30/24 08:06 EDT        "

## 2024-05-30 NOTE — PLAN OF CARE
Problem: Adult Inpatient Plan of Care  Goal: Plan of Care Review  Outcome: Ongoing, Progressing  Flowsheets (Taken 5/30/2024 0411)  Progress: improving  Plan of Care Reviewed With: patient  Outcome Evaluation: No acute events noted overnight, pt up to [facility] as needed independently. IV antibiotics given, see MAR. Plan of care continued.     Problem: Adult Inpatient Plan of Care  Goal: Patient-Specific Goal (Individualized)  Outcome: Ongoing, Progressing     Problem: Adult Inpatient Plan of Care  Goal: Absence of Hospital-Acquired Illness or Injury  Outcome: Ongoing, Progressing  Intervention: Identify and Manage Fall Risk  Recent Flowsheet Documentation  Taken 5/30/2024 0400 by Perla Fournier RN  Safety Promotion/Fall Prevention: safety round/check completed  Taken 5/30/2024 0200 by Perla Fournier RN  Safety Promotion/Fall Prevention:   safety round/check completed   assistive device/personal items within reach   clutter free environment maintained   fall prevention program maintained   nonskid shoes/slippers when out of bed  Taken 5/30/2024 0000 by Perla Fournier RN  Safety Promotion/Fall Prevention:   safety round/check completed   assistive device/personal items within reach   clutter free environment maintained   fall prevention program maintained  Taken 5/29/2024 2200 by Perla Fournier RN  Safety Promotion/Fall Prevention:   safety round/check completed   activity supervised   assistive device/personal items within reach   clutter free environment maintained   fall prevention program maintained   nonskid shoes/slippers when out of bed   room organization consistent  Taken 5/29/2024 2021 by Perla Fournier RN  Safety Promotion/Fall Prevention:   safety round/check completed   assistive device/personal items within reach   fall prevention program maintained   lighting adjusted  Intervention: Prevent Skin Injury  Recent Flowsheet Documentation  Taken 5/30/2024 0400 by Perla Fournier RN  Body  Position:   side-lying   left  Taken 5/30/2024 0200 by Perla Fournier RN  Body Position: supine, legs elevated  Taken 5/30/2024 0000 by Perla Fournier RN  Body Position:   side-lying   right  Taken 5/29/2024 2200 by Perla Fournier RN  Body Position: side-lying  Taken 5/29/2024 2021 by Perla Fournier RN  Body Position: side-lying  Intervention: Prevent and Manage VTE (Venous Thromboembolism) Risk  Recent Flowsheet Documentation  Taken 5/30/2024 0400 by Perla Fournier RN  Activity Management: up ad peter  Taken 5/30/2024 0200 by Perla Fournier RN  Activity Management: up ad peter  Taken 5/30/2024 0000 by Perla Fournier RN  Activity Management: activity minimized  Taken 5/29/2024 2200 by Perla Fournier RN  Activity Management: activity minimized  Taken 5/29/2024 2021 by Perla Fournier RN  Activity Management: activity minimized  VTE Prevention/Management:   bilateral   sequential compression devices off  Range of Motion: active ROM (range of motion) encouraged  Intervention: Prevent Infection  Recent Flowsheet Documentation  Taken 5/30/2024 0000 by Perla Fournier RN  Infection Prevention:   environmental surveillance performed   single patient room provided  Taken 5/29/2024 2200 by Perla Fournier RN  Infection Prevention:   equipment surfaces disinfected   environmental surveillance performed   single patient room provided   hand hygiene promoted  Taken 5/29/2024 2021 by Perla Fournier RN  Infection Prevention:   environmental surveillance performed   rest/sleep promoted   single patient room provided   Goal Outcome Evaluation:  Plan of Care Reviewed With: patient        Progress: improving  Outcome Evaluation: No acute events noted overnight, pt up to [facility] as needed independently. IV antibiotics given, see MAR. Plan of care continued.

## 2024-05-30 NOTE — PLAN OF CARE
Goal Outcome Evaluation:  Plan of Care Reviewed With: patient        Progress: improving  Outcome Evaluation: Patient had no acute events today. CIWA initiated. Possible D/C tomorrow

## 2024-05-31 VITALS
HEIGHT: 64 IN | BODY MASS INDEX: 29.24 KG/M2 | OXYGEN SATURATION: 97 % | WEIGHT: 171.3 LBS | SYSTOLIC BLOOD PRESSURE: 103 MMHG | HEART RATE: 108 BPM | RESPIRATION RATE: 18 BRPM | TEMPERATURE: 97.9 F | DIASTOLIC BLOOD PRESSURE: 60 MMHG

## 2024-05-31 LAB
ANION GAP SERPL CALCULATED.3IONS-SCNC: 16.1 MMOL/L (ref 5–15)
BASOPHILS # BLD AUTO: 0.08 10*3/MM3 (ref 0–0.2)
BASOPHILS NFR BLD AUTO: 0.7 % (ref 0–1.5)
BUN SERPL-MCNC: 6 MG/DL (ref 6–20)
BUN/CREAT SERPL: 10.2 (ref 7–25)
CALCIUM SPEC-SCNC: 7.3 MG/DL (ref 8.6–10.5)
CHLORIDE SERPL-SCNC: 110 MMOL/L (ref 98–107)
CO2 SERPL-SCNC: 15.9 MMOL/L (ref 22–29)
CREAT SERPL-MCNC: 0.59 MG/DL (ref 0.57–1)
DEPRECATED RDW RBC AUTO: 50.3 FL (ref 37–54)
EGFRCR SERPLBLD CKD-EPI 2021: 105.3 ML/MIN/1.73
EOSINOPHIL # BLD AUTO: 0.19 10*3/MM3 (ref 0–0.4)
EOSINOPHIL NFR BLD AUTO: 1.6 % (ref 0.3–6.2)
ERYTHROCYTE [DISTWIDTH] IN BLOOD BY AUTOMATED COUNT: 13.8 % (ref 12.3–15.4)
GLUCOSE SERPL-MCNC: 84 MG/DL (ref 65–99)
HCT VFR BLD AUTO: 25.8 % (ref 34–46.6)
HCT VFR BLD AUTO: 26.3 % (ref 34–46.6)
HGB BLD-MCNC: 8.2 G/DL (ref 12–15.9)
HGB BLD-MCNC: 8.2 G/DL (ref 12–15.9)
IMM GRANULOCYTES # BLD AUTO: 0.07 10*3/MM3 (ref 0–0.05)
IMM GRANULOCYTES NFR BLD AUTO: 0.6 % (ref 0–0.5)
LYMPHOCYTES # BLD AUTO: 1.92 10*3/MM3 (ref 0.7–3.1)
LYMPHOCYTES NFR BLD AUTO: 15.8 % (ref 19.6–45.3)
MCH RBC QN AUTO: 31.8 PG (ref 26.6–33)
MCHC RBC AUTO-ENTMCNC: 31.8 G/DL (ref 31.5–35.7)
MCV RBC AUTO: 100 FL (ref 79–97)
MONOCYTES # BLD AUTO: 0.64 10*3/MM3 (ref 0.1–0.9)
MONOCYTES NFR BLD AUTO: 5.3 % (ref 5–12)
NEUTROPHILS NFR BLD AUTO: 76 % (ref 42.7–76)
NEUTROPHILS NFR BLD AUTO: 9.28 10*3/MM3 (ref 1.7–7)
NRBC BLD AUTO-RTO: 0 /100 WBC (ref 0–0.2)
PLATELET # BLD AUTO: 173 10*3/MM3 (ref 140–450)
PMV BLD AUTO: 11 FL (ref 6–12)
POTASSIUM SERPL-SCNC: 3.8 MMOL/L (ref 3.5–5.2)
RBC # BLD AUTO: 2.58 10*6/MM3 (ref 3.77–5.28)
SODIUM SERPL-SCNC: 142 MMOL/L (ref 136–145)
WBC NRBC COR # BLD AUTO: 12.18 10*3/MM3 (ref 3.4–10.8)

## 2024-05-31 PROCEDURE — 99239 HOSP IP/OBS DSCHRG MGMT >30: CPT | Performed by: NURSE PRACTITIONER

## 2024-05-31 PROCEDURE — 80048 BASIC METABOLIC PNL TOTAL CA: CPT | Performed by: NURSE PRACTITIONER

## 2024-05-31 PROCEDURE — 85025 COMPLETE CBC W/AUTO DIFF WBC: CPT | Performed by: NURSE PRACTITIONER

## 2024-05-31 PROCEDURE — 85014 HEMATOCRIT: CPT | Performed by: NURSE PRACTITIONER

## 2024-05-31 PROCEDURE — G0378 HOSPITAL OBSERVATION PER HR: HCPCS

## 2024-05-31 PROCEDURE — 85018 HEMOGLOBIN: CPT | Performed by: NURSE PRACTITIONER

## 2024-05-31 RX ORDER — FOLIC ACID 1 MG/1
1 TABLET ORAL DAILY
Qty: 30 TABLET | Refills: 0 | Status: SHIPPED | OUTPATIENT
Start: 2024-05-31

## 2024-05-31 RX ORDER — VIT E ACET/GLY/DIMETH/WATER
LOTION (ML) TOPICAL DAILY
Start: 2024-05-31

## 2024-05-31 RX ORDER — LOPERAMIDE HYDROCHLORIDE 2 MG/1
2 CAPSULE ORAL 4 TIMES DAILY PRN
Qty: 30 CAPSULE | Refills: 0 | Status: SHIPPED | OUTPATIENT
Start: 2024-05-31

## 2024-05-31 RX ORDER — THIAMINE MONONITRATE (VIT B1) 100 MG
100 TABLET ORAL DAILY
Qty: 30 TABLET | Refills: 0 | Status: SHIPPED | OUTPATIENT
Start: 2024-05-31

## 2024-05-31 RX ORDER — MULTIPLE VITAMINS W/ MINERALS TAB 9MG-400MCG
1 TAB ORAL DAILY
Qty: 30 EACH | Refills: 0 | Status: SHIPPED | OUTPATIENT
Start: 2024-05-31

## 2024-05-31 RX ADMIN — FOLIC ACID 1 MG: 1 TABLET ORAL at 09:59

## 2024-05-31 RX ADMIN — THIAMINE HCL TAB 100 MG 100 MG: 100 TAB at 09:59

## 2024-05-31 RX ADMIN — COLLAGENASE SANTYL 1 APPLICATION: 250 OINTMENT TOPICAL at 10:02

## 2024-05-31 RX ADMIN — Medication: at 10:02

## 2024-05-31 RX ADMIN — ATORVASTATIN CALCIUM 10 MG: 10 TABLET, FILM COATED ORAL at 09:59

## 2024-05-31 RX ADMIN — Medication 1 TABLET: at 09:59

## 2024-05-31 RX ADMIN — Medication 10 ML: at 10:00

## 2024-05-31 RX ADMIN — FLUTICASONE PROPIONATE 2 SPRAY: 50 SPRAY, METERED NASAL at 10:02

## 2024-05-31 NOTE — DISCHARGE SUMMARY
[facility]      Name:  Nicole Silva   Age:  58 y.o.  Sex:  female  :  1965  MRN:  [id number]   Visit Number:  [id number]    Admission Date:  2024  Date of Discharge:  2024  Primary Care Physician:  Aleksandra Andres APRN    Important issues to note:    -Patient admitted due to nonhealing left foot wound.  Initially given Rocephin and vancomycin.  No osteomyelitis or surrounding cellulitis noted.  Patient to continue following with podiatry outpatient.  Continue Santyl and wound dressings daily.  -Patient admitted to alcohol dependence during admission with mild withdrawal noted.  Offered behavioral health upon discharge with patient denying.  -Patient noted to have norovirus positive.  Onset of diarrhea 1 month ago.  Likely shedding virus with recurrent and worsening pneumonia due to overall withdrawal.  Imodium given with overall improvement noted and reassuring labs.  -Advised to increase water intake.  -She will follow with podiatry and PCP in 1 week.  -Advised alcohol cessation.  -Strict return precautions given.    Discharge Diagnoses:     Nonhealing left foot laceration/cellulitis, POA  Acute gastroenteritis due to norovirus, POA  Hypokalemia  Hypomagnesemia  Lactic acidosis  Alcohol dependence with withdrawal    Problem List:     Active Hospital Problems    Diagnosis  POA    **Cellulitis of left foot [L03.116]  Yes    Lactic acidosis [E87.20]  Yes    Hypokalemia [E87.6]  Yes    Hypomagnesemia [E83.42]  Yes      Resolved Hospital Problems    Diagnosis Date Resolved POA    Sepsis [A41.9] 2024 Yes     Presenting Problem:    Chief Complaint   Patient presents with    Wound Check     Pt reports left foot pain worsening over last 4 weeks. Wound has purulent drainage coming from it. Pt been treated with antibiotics. Pt reports multiple episodes of diarrhea.       Consults:     Consulting Physician(s)         Provider   Role Specialty      Sridevi Boland DPM      Consulting Physician Podiatry          Procedures Performed:        History of presenting illness/Hospital Course:    58-year-old with pertinent past medical history of hypertension, COPD, who presented to the emergency room with complaint of a wound on her left foot and ankle.  Onset 1 month ago. She was at her brother's house when she tripped over a dog, ended up with a laceration to the left foot/ankle.  She did not seek emergency care at the time.  She noted that she ended up going to the emergency room about a week later after as it was not healing up and was placed on antibiotic that she completed about 2 weeks ago.  She was also given mupirocin ointment, and instructed on wound care with Betadine. Otherwise she admits to some swelling of her lower extremities over the last 3 to 4 months.  She has not seen a primary care provider in quite some time.  She has lost some weight since last year.  Denies any alcohol or drug use.  Did also endorse severe diarrhea over the past 1 month which has worsened p.o. intake.  Recent death of ex- had also been difficult.     In the ER she was overall hemodynamically stable.  She was noted to have hypokalemia, hypocalcemia, hypomagnesemia, hypoalbuminemia, elevated lactic acid, procalcitonin 0.26, and a white count of 12,000 hemoglobin 9.7.  X-ray of the left foot did not show any bony abnormality.  Due to concern for infection of the left foot hospitalist consulted for further medical management.  Wound consult placed.  Patient noted to have norovirus on GI panel.  Electrolyte replacement initiated per protocol.  Patient did admit to alcohol use daily for the past 4 years.  Normally drinking vodka about 1/5 over 3 to 4 days.  However, had decreased drinking since the passing of her ex-.  CIWA protocol initiated.  Podiatry did see patient as well.  Podiatry recommended arterial Doppler which was normal and daily Santyl with follow-up in  1 week.  Imodium scheduled for diarrhea with significant improvement.  Patient likely still shedding norovirus, however, current diarrhea likely secondary to withdrawal.  Overall reassuring labs and vitals noted.  Patient to continue multivitamin/thiamine/folic acid.  Patient denied home health upon discharge.  She will follow-up with podiatry and PCP within 1 week.  Strict return precautions given.    Vital Signs:    Temp:  [97.9 °F (36.6 °C)-99.1 °F (37.3 °C)] 97.9 °F (36.6 °C)  Heart Rate:  [103-116] 108  Resp:  [18-20] 18  BP: ()/(60-73) 103/60    Physical Exam:    General Appearance:  Alert and cooperative.  Well-appearing middle-age female.   Head:  Atraumatic and normocephalic.   Eyes: Conjunctivae and sclerae normal, no icterus. No pallor.   Ears:  Ears with no abnormalities noted.   Throat: No oral lesions, no thrush, oral mucosa moist.   Neck: Supple, trachea midline, no thyromegaly.   Back:   No kyphoscoliosis present. No tenderness to palpation.   Lungs:   Breath sounds heard bilaterally equally.  No crackles or wheezing. No Pleural rub or bronchial breathing.  On room air unlabored.   Heart:  Normal S1 and S2, no murmur, no gallop, no rub. No JVD.   Abdomen:   Normal bowel sounds, no masses, no organomegaly. Soft, nontender, nondistended, no rebound tenderness.   Extremities: Supple, trace bilateral lower extremity edema, no cyanosis, no clubbing.   Pulses: Pulses palpable bilaterally.   Skin: No bleeding or rash.  Left foot laceration with granulating tissue noted without surrounding erythema.   Neurologic: Alert and oriented x 3. No facial asymmetry. Moves all four limbs. No tremors.  Generalized weakness.     Pertinent Lab Results:     Results from last 7 days   Lab Units 05/31/24  0716 05/30/24  0402 05/29/24  1350 05/29/24  0300 05/28/24  1743   SODIUM mmol/L 142 141  --  138 139   POTASSIUM mmol/L 3.8 4.0  4.0 3.4* 2.5* 2.5*   CHLORIDE mmol/L 110* 108*  --  100 98   CO2 mmol/L 15.9* 23.5   --  24.7 27.8   BUN mg/dL 6 9  --  13 15   CREATININE mg/dL 0.59 0.67  --  0.74 0.75   CALCIUM mg/dL 7.3* 7.2*  --  7.0* 7.5*   BILIRUBIN mg/dL  --   --   --  0.4 0.2   ALK PHOS U/L  --   --   --  238* 231*   ALT (SGPT) U/L  --   --   --  9 9   AST (SGOT) U/L  --   --   --  50* 38*   GLUCOSE mg/dL 84 107*  --  93 94     Results from last 7 days   Lab Units 05/31/24  0903 05/31/24  0716 05/30/24  0402 05/29/24  0300 05/28/24  1645   WBC 10*3/mm3  --  12.18* 7.43  --  12.86*   HEMOGLOBIN g/dL 8.2* 8.2* 7.4*   < > 9.7*   HEMATOCRIT % 26.3* 25.8* 23.7*   < > 29.0*   PLATELETS 10*3/mm3  --  173 126*  --  219    < > = values in this interval not displayed.                             Results from last 7 days   Lab Units 05/29/24  0047 05/28/24  2109 05/28/24  1650 05/28/24  1640   BLOODCX   --   --  No growth at 2 days No growth at 2 days   URINECX  <25,000 CFU/mL Gram Negative Bacilli*  --   --   --    WOUNDCX   --  No growth at 2 days  --   --        Pertinent Radiology Results:    Imaging Results (All)       Procedure Component Value Units Date/Time    US Arterial Doppler Lower Extremity Left [[id number]] Collected: 05/30/24 0828     Updated: 05/30/24 0832    Narrative:      Left PROCEDURE: US ARTERIAL DOPPLER LOWER EXTREMITY  LEFT-     ARTERIAL DUPLEX DOPPLER EVALUATION OF THE LEFT LOWER EXTREMITY WITH  SPECTRAL ANALYSIS .     HISTORY: non-healing ulcer; L03.116-Cellulitis of left lower limb;  E87.6-Hypokalemia; E87.20-Acidosis, unspecified; E83.42-Hypomagnesemia     PROCEDURE:  Doppler velocity waveform evaluation of the left lower  extremity was performed .     FINDINGS:     LEFT LOWER EXTREMITY.      Velocities cm/sec :        CFA: 117 cm/s  SFA Mid: 90.9 cm/s   SFA Dist: 55.7 cm/s  POP: 59.2 cm/s  PT: 86.6 cm/s  ABBIE: 81.5 cm/s        Waveforms are triphasic and biphasic.          Impression:      No significant peripheral vascular disease.              This report was signed and finalized on 5/30/2024 8:30 AM by  Amna Segovia MD.       US Venous Doppler Lower Extremity Bilateral (duplex) [[id number]] Collected: 05/30/24 0701     Updated: 05/30/24 0704    Narrative:      BILATERAL LOWER EXTREMITY VENOUS DUPLEX DOPPLER EXAMINATION     HISTORY: edema; L03.116-Cellulitis of left lower limb;  E87.6-Hypokalemia; E87.20-Acidosis, unspecified; E83.42-Hypomagnesemia     COMPARISON:   None     PROCEDURE: Multiple transverse and longitudinal scans were performed of  both femoropopliteal deep venous system, with augmentation and  compression maneuvers.     FINDINGS: Proper phasic flow was noted in the visualized deep venous  system of both lower extremities. No intraluminal increased echogenicity  is noted to suggest thrombus. There is proper compression and  augmentation of the venous structures. No abnormal venous collaterals  are seen.       Impression:      No evidence of left or right lower extremity deep venous  thrombosis.                 This report was signed and finalized on 5/30/2024 7:02 AM by Amna Segovia MD.       XR Foot 3+ View Left [[id number]] Collected: 05/28/24 1645     Updated: 05/28/24 1649    Narrative:      PROCEDURE: XR FOOT 3+ VW LEFT-     HISTORY: large wound on lateral left ankle, concern for osteomyelitis     COMPARISON: None.     FINDINGS:  A three view exam demonstrates no acute fracture or  dislocation. The joint spaces appear unremarkable. No radiopaque foreign  body identified in the soft tissues. There appears to be mild soft  tissue swelling adjacent to the lateral malleolus.       Impression:      No acute bony abnormality.                 This report was signed and finalized on 5/28/2024 4:47 PM by Amna Segovia MD.               Echo:      Condition on Discharge:      Stable.    Code status during the hospital stay:    Code Status and Medical Interventions:   Ordered at: 05/28/24 2051     Code Status (Patient has no pulse and is not breathing):    CPR (Attempt to Resuscitate)     Medical  Interventions (Patient has pulse or is breathing):    Full Support     Discharge Disposition:    Home or Self Care    Discharge Medications:       Discharge Medications        New Medications        Instructions Start Date   cetaphil lotion   Topical, Daily      collagenase 250 UNIT/GM ointment   1 Application, Topical, Every 24 Hours Scheduled      folic acid 1 MG tablet  Commonly known as: FOLVITE   1 mg, Oral, Daily      loperamide 2 MG capsule  Commonly known as: IMODIUM   2 mg, Oral, 4 Times Daily PRN      multivitamin with minerals tablet tablet   1 tablet, Oral, Daily      thiamine 100 MG tablet  Commonly known as: VITAMIN B-1   100 mg, Oral, Daily             Continue These Medications        Instructions Start Date   albuterol sulfate  (90 Base) MCG/ACT inhaler  Commonly known as: PROVENTIL HFA;VENTOLIN HFA;PROAIR HFA   Inhalation      atorvastatin 10 MG tablet  Commonly known as: LIPITOR   1 tablet, Oral, Daily      cyclobenzaprine 5 MG tablet  Commonly known as: FLEXERIL   5 mg, Oral, 3 Times Daily PRN      fluticasone 110 MCG/ACT inhaler  Commonly known as: FLOVENT HFA   Inhale 1 puff twice daily.  Rinse mouth well after using.      fluticasone 50 MCG/ACT nasal spray  Commonly known as: FLONASE   2 sprays, Nasal, Daily      levocetirizine 5 MG tablet  Commonly known as: XYZAL   5 mg, Oral, Every Evening      montelukast 10 MG tablet  Commonly known as: SINGULAIR   10 mg, Oral, Nightly      Multivitamin tablet tablet  Generic drug: multivitamin       progesterone 100 MG capsule  Commonly known as: PROMETRIUM   TAKE ONE CAPSULE BY MOUTH DAILY      Progesterone 100 MG capsule  Commonly known as: PROMETRIUM   1 capsule, Oral, Nightly      venlafaxine XR 75 MG 24 hr capsule  Commonly known as: EFFEXOR-XR   150 mg, Oral      vitamin D 1.25 MG (06943 UT) capsule capsule  Commonly known as: ERGOCALCIFEROL              Stop These Medications      lisinopril 10 MG tablet  Commonly known as:  PRINIVIL,ZESTRIL     methylPREDNISolone 4 MG dose pack  Commonly known as: MEDROL     pseudoephedrine 120 MG 12 hr tablet  Commonly known as: SUDAFED            Discharge Diet:     Diet Instructions       Diet: Regular/House Diet; Thin (IDDSI 0)      Discharge Diet: Regular/House Diet    Fluid Consistency: Thin (IDDSI 0)          Activity at Discharge:     Activity Instructions       Activity as Tolerated            Follow-up Appointments:     Follow-up Information       Sridevi Boland DPM Follow up in 1 week(s).    Specialty: Podiatry  Why: @8:15am  Contact information:  [address] [place] [address] [address] [place] [address] [phone]               Aleksandra Andres APRN Follow up on 6/10/2024.    Specialty: Family Medicine  Why: @10:40am  Contact information:  [address] [place] [address] [address]ONES [address] [place] [id number] [phone]                           No future appointments.  Test Results Pending at Discharge:    Pending Labs       Order Current Status    Blood Culture With NEMO - Blood, Arm, Left Preliminary result    Blood Culture With NEMO - Blood, Hand, Left Preliminary result    Wound Culture - Wound, Foot, Left Preliminary result               Carolyn Rojas, MARVIN  05/31/24  09:58 EDT    Time: I spent 55 minutes on this discharge activity which included: face-to-face encounter with the patient, reviewing the data in the system, coordination of the care with the nursing staff as well as consultants, documentation, and entering orders.     Dictated utilizing Dragon dictation.

## 2024-05-31 NOTE — DISCHARGE INSTRUCTIONS
Patient to be discharged home.  Follow with podiatry and PCP as scheduled.  Alcohol cessation advised.  Continue multivitamin/folate/thiamine.  Return to emergency department for worsening symptoms.  Continue wound care as directed.

## 2024-05-31 NOTE — PLAN OF CARE
Goal Outcome Evaluation:  Plan of Care Reviewed With: patient        Progress: improving  Outcome Evaluation: VSS, no acute events overnight

## 2024-05-31 NOTE — PLAN OF CARE
Goal Outcome Evaluation:         Patient meets criteria for discharge per provider       Problem: Adult Inpatient Plan of Care  Goal: Plan of Care Review  Outcome: Met  Goal: Patient-Specific Goal (Individualized)  Outcome: Met  Goal: Absence of Hospital-Acquired Illness or Injury  Outcome: Met  Goal: Optimal Comfort and Wellbeing  Outcome: Met  Goal: Readiness for Transition of Care  Outcome: Met     Problem: Adjustment to Illness (Sepsis/Septic Shock)  Goal: Optimal Coping  Outcome: Met     Problem: Bleeding (Sepsis/Septic Shock)  Goal: Absence of Bleeding  Outcome: Met     Problem: Glycemic Control Impaired (Sepsis/Septic Shock)  Goal: Blood Glucose Level Within Desired Range  Outcome: Met     Problem: Infection Progression (Sepsis/Septic Shock)  Goal: Absence of Infection Signs and Symptoms  Outcome: Met     Problem: Nutrition Impaired (Sepsis/Septic Shock)  Goal: Optimal Nutrition Intake  Outcome: Met     Problem: Impaired Wound Healing  Goal: Optimal Wound Healing  Outcome: Met     Problem: Fall Injury Risk  Goal: Absence of Fall and Fall-Related Injury  Outcome: Met     Problem: Skin Injury Risk Increased  Goal: Skin Health and Integrity  Outcome: Met

## 2024-05-31 NOTE — CASE MANAGEMENT/SOCIAL WORK
Case Management Discharge Note      Final Note: DC home with family to transport. Denies any needs.    Provided Post Acute Provider List?: N/A  Provided Post Acute Provider Quality & Resource List?: N/A    Selected Continued Care - Discharged on 5/31/2024 Admission date: 5/28/2024 - Discharge disposition: Home or Self Care      Destination    No services have been selected for the patient.                Durable Medical Equipment    No services have been selected for the patient.                Dialysis/Infusion    No services have been selected for the patient.                Home Medical Care    No services have been selected for the patient.                Therapy    No services have been selected for the patient.                Community Resources    No services have been selected for the patient.                Community & DME    No services have been selected for the patient.                    Transportation Services  Private: Car    Final Discharge Disposition Code: 01 - home or self-care

## 2024-06-01 LAB
BACTERIA SPEC AEROBE CULT: NORMAL
GRAM STN SPEC: NORMAL

## 2024-06-02 LAB
BACTERIA SPEC AEROBE CULT: NORMAL
BACTERIA SPEC AEROBE CULT: NORMAL

## 2024-06-10 ENCOUNTER — HOSPITAL ENCOUNTER (INPATIENT)
Facility: HOSPITAL | Age: 59
LOS: 3 days | Discharge: HOME | DRG: 432 | End: 2024-06-14
Attending: EMERGENCY MEDICINE | Admitting: FAMILY MEDICINE
Payer: MEDICAID

## 2024-06-10 ENCOUNTER — APPOINTMENT (OUTPATIENT)
Dept: GENERAL RADIOLOGY | Facility: HOSPITAL | Age: 59
DRG: 432 | End: 2024-06-10
Payer: MEDICAID

## 2024-06-10 DIAGNOSIS — E87.6 HYPOKALEMIA: Primary | ICD-10-CM

## 2024-06-10 DIAGNOSIS — E83.42 HYPOMAGNESEMIA: ICD-10-CM

## 2024-06-10 DIAGNOSIS — R53.81 DEBILITY: ICD-10-CM

## 2024-06-10 DIAGNOSIS — R60.1 ANASARCA: ICD-10-CM

## 2024-06-10 LAB
ALBUMIN SERPL-MCNC: 2.4 G/DL (ref 3.5–5.2)
ALBUMIN/GLOB SERPL: 0.9 G/DL
ALP SERPL-CCNC: 237 U/L (ref 39–117)
ALT SERPL W P-5'-P-CCNC: 9 U/L (ref 1–33)
ANION GAP SERPL CALCULATED.3IONS-SCNC: 13.9 MMOL/L (ref 5–15)
AST SERPL-CCNC: 34 U/L (ref 1–32)
BASOPHILS # BLD AUTO: 0.05 10*3/MM3 (ref 0–0.2)
BASOPHILS NFR BLD AUTO: 0.3 % (ref 0–1.5)
BILIRUB SERPL-MCNC: 0.3 MG/DL (ref 0–1.2)
BUN SERPL-MCNC: 4 MG/DL (ref 6–20)
BUN/CREAT SERPL: 5.4 (ref 7–25)
CALCIUM SPEC-SCNC: 7.1 MG/DL (ref 8.6–10.5)
CHLORIDE SERPL-SCNC: 102 MMOL/L (ref 98–107)
CO2 SERPL-SCNC: 20.1 MMOL/L (ref 22–29)
CREAT SERPL-MCNC: 0.74 MG/DL (ref 0.57–1)
DEPRECATED RDW RBC AUTO: 58 FL (ref 37–54)
EGFRCR SERPLBLD CKD-EPI 2021: 94.5 ML/MIN/1.73
EOSINOPHIL # BLD AUTO: 0.16 10*3/MM3 (ref 0–0.4)
EOSINOPHIL NFR BLD AUTO: 1.1 % (ref 0.3–6.2)
ERYTHROCYTE [DISTWIDTH] IN BLOOD BY AUTOMATED COUNT: 15.6 % (ref 12.3–15.4)
GLOBULIN UR ELPH-MCNC: 2.8 GM/DL
GLUCOSE SERPL-MCNC: 129 MG/DL (ref 65–99)
HCT VFR BLD AUTO: 28.6 % (ref 34–46.6)
HGB BLD-MCNC: 9.2 G/DL (ref 12–15.9)
IMM GRANULOCYTES # BLD AUTO: 0.07 10*3/MM3 (ref 0–0.05)
IMM GRANULOCYTES NFR BLD AUTO: 0.5 % (ref 0–0.5)
LYMPHOCYTES # BLD AUTO: 1.52 10*3/MM3 (ref 0.7–3.1)
LYMPHOCYTES NFR BLD AUTO: 10.6 % (ref 19.6–45.3)
MAGNESIUM SERPL-MCNC: 0.9 MG/DL (ref 1.6–2.6)
MCH RBC QN AUTO: 32.4 PG (ref 26.6–33)
MCHC RBC AUTO-ENTMCNC: 32.2 G/DL (ref 31.5–35.7)
MCV RBC AUTO: 100.7 FL (ref 79–97)
MONOCYTES # BLD AUTO: 1.07 10*3/MM3 (ref 0.1–0.9)
MONOCYTES NFR BLD AUTO: 7.5 % (ref 5–12)
NEUTROPHILS NFR BLD AUTO: 11.43 10*3/MM3 (ref 1.7–7)
NEUTROPHILS NFR BLD AUTO: 80 % (ref 42.7–76)
NRBC BLD AUTO-RTO: 0 /100 WBC (ref 0–0.2)
NT-PROBNP SERPL-MCNC: 2249 PG/ML (ref 0–900)
PLATELET # BLD AUTO: 247 10*3/MM3 (ref 140–450)
PMV BLD AUTO: 10.5 FL (ref 6–12)
POTASSIUM SERPL-SCNC: 2.8 MMOL/L (ref 3.5–5.2)
PROT SERPL-MCNC: 5.2 G/DL (ref 6–8.5)
RBC # BLD AUTO: 2.84 10*6/MM3 (ref 3.77–5.28)
SODIUM SERPL-SCNC: 136 MMOL/L (ref 136–145)
TROPONIN T SERPL HS-MCNC: 19 NG/L
WBC NRBC COR # BLD AUTO: 14.3 10*3/MM3 (ref 3.4–10.8)

## 2024-06-10 PROCEDURE — 83735 ASSAY OF MAGNESIUM: CPT | Performed by: EMERGENCY MEDICINE

## 2024-06-10 PROCEDURE — 93005 ELECTROCARDIOGRAM TRACING: CPT | Performed by: EMERGENCY MEDICINE

## 2024-06-10 PROCEDURE — 71045 X-RAY EXAM CHEST 1 VIEW: CPT

## 2024-06-10 PROCEDURE — 84484 ASSAY OF TROPONIN QUANT: CPT | Performed by: EMERGENCY MEDICINE

## 2024-06-10 PROCEDURE — 80053 COMPREHEN METABOLIC PANEL: CPT | Performed by: EMERGENCY MEDICINE

## 2024-06-10 PROCEDURE — 99223 1ST HOSP IP/OBS HIGH 75: CPT | Performed by: INTERNAL MEDICINE

## 2024-06-10 PROCEDURE — 99285 EMERGENCY DEPT VISIT HI MDM: CPT

## 2024-06-10 PROCEDURE — 25010000002 MAGNESIUM SULFATE 2 GM/50ML SOLUTION: Performed by: EMERGENCY MEDICINE

## 2024-06-10 PROCEDURE — 83880 ASSAY OF NATRIURETIC PEPTIDE: CPT | Performed by: EMERGENCY MEDICINE

## 2024-06-10 PROCEDURE — 36415 COLL VENOUS BLD VENIPUNCTURE: CPT

## 2024-06-10 PROCEDURE — 85025 COMPLETE CBC W/AUTO DIFF WBC: CPT | Performed by: EMERGENCY MEDICINE

## 2024-06-10 PROCEDURE — 25010000002 POTASSIUM CHLORIDE 10 MEQ/100ML SOLUTION: Performed by: EMERGENCY MEDICINE

## 2024-06-10 PROCEDURE — 99291 CRITICAL CARE FIRST HOUR: CPT

## 2024-06-10 PROCEDURE — G0378 HOSPITAL OBSERVATION PER HR: HCPCS

## 2024-06-10 RX ORDER — POTASSIUM CHLORIDE 7.45 MG/ML
10 INJECTION INTRAVENOUS ONCE
Status: DISCONTINUED | OUTPATIENT
Start: 2024-06-11 | End: 2024-06-10

## 2024-06-10 RX ORDER — POTASSIUM CHLORIDE 7.45 MG/ML
10 INJECTION INTRAVENOUS
Qty: 600 ML | Refills: 0 | Status: COMPLETED | OUTPATIENT
Start: 2024-06-10 | End: 2024-06-11

## 2024-06-10 RX ORDER — POTASSIUM CHLORIDE 750 MG/1
40 CAPSULE, EXTENDED RELEASE ORAL ONCE
Status: COMPLETED | OUTPATIENT
Start: 2024-06-10 | End: 2024-06-10

## 2024-06-10 RX ORDER — MAGNESIUM SULFATE HEPTAHYDRATE 40 MG/ML
2 INJECTION, SOLUTION INTRAVENOUS ONCE
Status: COMPLETED | OUTPATIENT
Start: 2024-06-10 | End: 2024-06-11

## 2024-06-10 RX ADMIN — POTASSIUM CHLORIDE 10 MEQ: 7.46 INJECTION, SOLUTION INTRAVENOUS at 23:40

## 2024-06-10 RX ADMIN — POTASSIUM CHLORIDE 40 MEQ: 750 CAPSULE, EXTENDED RELEASE ORAL at 23:14

## 2024-06-10 RX ADMIN — MAGNESIUM SULFATE HEPTAHYDRATE 2 G: 40 INJECTION, SOLUTION INTRAVENOUS at 23:34

## 2024-06-11 ENCOUNTER — APPOINTMENT (OUTPATIENT)
Dept: CT IMAGING | Facility: HOSPITAL | Age: 59
DRG: 432 | End: 2024-06-11
Payer: MEDICAID

## 2024-06-11 ENCOUNTER — APPOINTMENT (OUTPATIENT)
Dept: CARDIOLOGY | Facility: HOSPITAL | Age: 59
DRG: 432 | End: 2024-06-11
Payer: MEDICAID

## 2024-06-11 PROBLEM — E88.09 HYPOALBUMINEMIA: Status: ACTIVE | Noted: 2024-06-11

## 2024-06-11 LAB
ADV 40+41 DNA STL QL NAA+NON-PROBE: NOT DETECTED
ALBUMIN SERPL-MCNC: 2.3 G/DL (ref 3.5–5.2)
ALBUMIN/GLOB SERPL: 0.8 G/DL
ALP SERPL-CCNC: 243 U/L (ref 39–117)
ALT SERPL W P-5'-P-CCNC: 9 U/L (ref 1–33)
ANION GAP SERPL CALCULATED.3IONS-SCNC: 9.9 MMOL/L (ref 5–15)
AST SERPL-CCNC: 46 U/L (ref 1–32)
ASTRO TYP 1-8 RNA STL QL NAA+NON-PROBE: NOT DETECTED
BH CV ECHO MEAS - AO MAX PG: 8.8 MMHG
BH CV ECHO MEAS - AO MEAN PG: 5 MMHG
BH CV ECHO MEAS - AO ROOT DIAM: 3.2 CM
BH CV ECHO MEAS - AO V2 MAX: 148 CM/SEC
BH CV ECHO MEAS - AO V2 VTI: 30.9 CM
BH CV ECHO MEAS - AVA(I,D): 1.97 CM2
BH CV ECHO MEAS - EDV(CUBED): 85.2 ML
BH CV ECHO MEAS - EDV(MOD-SP2): 66.6 ML
BH CV ECHO MEAS - EDV(MOD-SP4): 88 ML
BH CV ECHO MEAS - EF(MOD-BP): 69.1 %
BH CV ECHO MEAS - EF(MOD-SP2): 63.5 %
BH CV ECHO MEAS - EF(MOD-SP4): 72.2 %
BH CV ECHO MEAS - EF_3D-VOL: 69 %
BH CV ECHO MEAS - ESV(CUBED): 16.8 ML
BH CV ECHO MEAS - ESV(MOD-SP2): 24.3 ML
BH CV ECHO MEAS - ESV(MOD-SP4): 24.5 ML
BH CV ECHO MEAS - FS: 41.8 %
BH CV ECHO MEAS - IVS/LVPW: 0.99 CM
BH CV ECHO MEAS - IVSD: 0.84 CM
BH CV ECHO MEAS - LA DIMENSION: 4 CM
BH CV ECHO MEAS - LAT PEAK E' VEL: 14.8 CM/SEC
BH CV ECHO MEAS - LV DIASTOLIC VOL/BSA (35-75): 51.6 CM2
BH CV ECHO MEAS - LV MASS(C)D: 117.6 GRAMS
BH CV ECHO MEAS - LV MAX PG: 4.8 MMHG
BH CV ECHO MEAS - LV MEAN PG: 3 MMHG
BH CV ECHO MEAS - LV SYSTOLIC VOL/BSA (12-30): 14.4 CM2
BH CV ECHO MEAS - LV V1 MAX: 110 CM/SEC
BH CV ECHO MEAS - LV V1 VTI: 25.9 CM
BH CV ECHO MEAS - LVIDD: 4.4 CM
BH CV ECHO MEAS - LVIDS: 2.6 CM
BH CV ECHO MEAS - LVOT AREA: 2.35 CM2
BH CV ECHO MEAS - LVOT DIAM: 1.73 CM
BH CV ECHO MEAS - LVPWD: 0.85 CM
BH CV ECHO MEAS - MED PEAK E' VEL: 13.3 CM/SEC
BH CV ECHO MEAS - MR MAX PG: 121.9 MMHG
BH CV ECHO MEAS - MR MAX VEL: 552 CM/SEC
BH CV ECHO MEAS - MR MEAN PG: 82 MMHG
BH CV ECHO MEAS - MR MEAN VEL: 431 CM/SEC
BH CV ECHO MEAS - MR VTI: 168 CM
BH CV ECHO MEAS - MV A MAX VEL: 132 CM/SEC
BH CV ECHO MEAS - MV DEC SLOPE: 827 CM/SEC2
BH CV ECHO MEAS - MV DEC TIME: 0.15 SEC
BH CV ECHO MEAS - MV E MAX VEL: 126 CM/SEC
BH CV ECHO MEAS - MV E/A: 0.95
BH CV ECHO MEAS - MV MAX PG: 8.8 MMHG
BH CV ECHO MEAS - MV MEAN PG: 6 MMHG
BH CV ECHO MEAS - MV V2 VTI: 25.7 CM
BH CV ECHO MEAS - MVA(VTI): 2.37 CM2
BH CV ECHO MEAS - PA ACC TIME: 0.16 SEC
BH CV ECHO MEAS - PA V2 MAX: 81.4 CM/SEC
BH CV ECHO MEAS - RAP SYSTOLE: 3 MMHG
BH CV ECHO MEAS - RV MAX PG: 1.7 MMHG
BH CV ECHO MEAS - RV V1 MAX: 65.2 CM/SEC
BH CV ECHO MEAS - RV V1 VTI: 12.4 CM
BH CV ECHO MEAS - RVSP: 36.5 MMHG
BH CV ECHO MEAS - SV(LVOT): 60.9 ML
BH CV ECHO MEAS - SV(MOD-SP2): 42.3 ML
BH CV ECHO MEAS - SV(MOD-SP4): 63.5 ML
BH CV ECHO MEAS - SVI(LVOT): 35.7 ML/M2
BH CV ECHO MEAS - SVI(MOD-SP2): 24.8 ML/M2
BH CV ECHO MEAS - SVI(MOD-SP4): 37.2 ML/M2
BH CV ECHO MEAS - TAPSE (>1.6): 2.5 CM
BH CV ECHO MEAS - TR MAX PG: 33.5 MMHG
BH CV ECHO MEAS - TR MAX VEL: 289.5 CM/SEC
BH CV ECHO MEAS RV FREE WALL STRAIN: -24.6 %
BH CV ECHO MEASUREMENTS AVERAGE E/E' RATIO: 8.97
BH CV XLRA - RV BASE: 3.2 CM
BH CV XLRA - RV LENGTH: 7.9 CM
BH CV XLRA - RV MID: 3.2 CM
BH CV XLRA - TDI S': 18.1 CM/SEC
BILIRUB SERPL-MCNC: 0.4 MG/DL (ref 0–1.2)
BUN SERPL-MCNC: 4 MG/DL (ref 6–20)
BUN/CREAT SERPL: 6.5 (ref 7–25)
C CAYETANENSIS DNA STL QL NAA+NON-PROBE: NOT DETECTED
C COLI+JEJ+UPSA DNA STL QL NAA+NON-PROBE: NOT DETECTED
C DIFF GDH + TOXINS A+B STL QL IA.RAPID: POSITIVE
C DIFF GDH + TOXINS A+B STL QL IA.RAPID: POSITIVE
CALCIUM SPEC-SCNC: 7 MG/DL (ref 8.6–10.5)
CHLORIDE SERPL-SCNC: 106 MMOL/L (ref 98–107)
CO2 SERPL-SCNC: 21.1 MMOL/L (ref 22–29)
CREAT SERPL-MCNC: 0.62 MG/DL (ref 0.57–1)
CRYPTOSP DNA STL QL NAA+NON-PROBE: NOT DETECTED
DEPRECATED RDW RBC AUTO: 58.2 FL (ref 37–54)
E HISTOLYT DNA STL QL NAA+NON-PROBE: NOT DETECTED
EAEC PAA PLAS AGGR+AATA ST NAA+NON-PRB: NOT DETECTED
EC STX1+STX2 GENES STL QL NAA+NON-PROBE: NOT DETECTED
EGFRCR SERPLBLD CKD-EPI 2021: 104 ML/MIN/1.73
EPEC EAE GENE STL QL NAA+NON-PROBE: NOT DETECTED
ERYTHROCYTE [DISTWIDTH] IN BLOOD BY AUTOMATED COUNT: 15.8 % (ref 12.3–15.4)
ETEC LTA+ST1A+ST1B TOX ST NAA+NON-PROBE: NOT DETECTED
G LAMBLIA DNA STL QL NAA+NON-PROBE: NOT DETECTED
GEN 5 2HR TROPONIN T REFLEX: 19 NG/L
GLOBULIN UR ELPH-MCNC: 2.9 GM/DL
GLUCOSE SERPL-MCNC: 112 MG/DL (ref 65–99)
HCT VFR BLD AUTO: 28.6 % (ref 34–46.6)
HGB BLD-MCNC: 9.3 G/DL (ref 12–15.9)
INR PPP: 1.05 (ref 0.9–1.1)
LEFT ATRIUM VOLUME INDEX: 30.3 ML/M2
MCH RBC QN AUTO: 32.4 PG (ref 26.6–33)
MCHC RBC AUTO-ENTMCNC: 32.5 G/DL (ref 31.5–35.7)
MCV RBC AUTO: 99.7 FL (ref 79–97)
NOROVIRUS GI+II RNA STL QL NAA+NON-PROBE: NOT DETECTED
P SHIGELLOIDES DNA STL QL NAA+NON-PROBE: NOT DETECTED
PLATELET # BLD AUTO: 245 10*3/MM3 (ref 140–450)
PMV BLD AUTO: 10.8 FL (ref 6–12)
POTASSIUM SERPL-SCNC: 3.9 MMOL/L (ref 3.5–5.2)
PROT SERPL-MCNC: 5.2 G/DL (ref 6–8.5)
PROTHROMBIN TIME: 14.3 SECONDS (ref 12.3–15.1)
RBC # BLD AUTO: 2.87 10*6/MM3 (ref 3.77–5.28)
RVA RNA STL QL NAA+NON-PROBE: NOT DETECTED
S ENT+BONG DNA STL QL NAA+NON-PROBE: NOT DETECTED
SAPO I+II+IV+V RNA STL QL NAA+NON-PROBE: NOT DETECTED
SHIGELLA SP+EIEC IPAH ST NAA+NON-PROBE: NOT DETECTED
SODIUM SERPL-SCNC: 137 MMOL/L (ref 136–145)
TROPONIN T DELTA: 0 NG/L
V CHOL+PARA+VUL DNA STL QL NAA+NON-PROBE: NOT DETECTED
V CHOLERAE DNA STL QL NAA+NON-PROBE: NOT DETECTED
WBC NRBC COR # BLD AUTO: 12.97 10*3/MM3 (ref 3.4–10.8)
Y ENTEROCOL DNA STL QL NAA+NON-PROBE: NOT DETECTED

## 2024-06-11 PROCEDURE — 94799 UNLISTED PULMONARY SVC/PX: CPT

## 2024-06-11 PROCEDURE — 99233 SBSQ HOSP IP/OBS HIGH 50: CPT | Performed by: FAMILY MEDICINE

## 2024-06-11 PROCEDURE — 74176 CT ABD & PELVIS W/O CONTRAST: CPT

## 2024-06-11 PROCEDURE — 25010000002 ONDANSETRON PER 1 MG: Performed by: INTERNAL MEDICINE

## 2024-06-11 PROCEDURE — 97166 OT EVAL MOD COMPLEX 45 MIN: CPT

## 2024-06-11 PROCEDURE — 87449 NOS EACH ORGANISM AG IA: CPT | Performed by: FAMILY MEDICINE

## 2024-06-11 PROCEDURE — 94761 N-INVAS EAR/PLS OXIMETRY MLT: CPT

## 2024-06-11 PROCEDURE — 99254 IP/OBS CNSLTJ NEW/EST MOD 60: CPT | Performed by: INTERNAL MEDICINE

## 2024-06-11 PROCEDURE — 97165 OT EVAL LOW COMPLEX 30 MIN: CPT

## 2024-06-11 PROCEDURE — P9047 ALBUMIN (HUMAN), 25%, 50ML: HCPCS | Performed by: INTERNAL MEDICINE

## 2024-06-11 PROCEDURE — 93306 TTE W/DOPPLER COMPLETE: CPT | Performed by: INTERNAL MEDICINE

## 2024-06-11 PROCEDURE — 84484 ASSAY OF TROPONIN QUANT: CPT | Performed by: EMERGENCY MEDICINE

## 2024-06-11 PROCEDURE — 87507 IADNA-DNA/RNA PROBE TQ 12-25: CPT | Performed by: FAMILY MEDICINE

## 2024-06-11 PROCEDURE — 85027 COMPLETE CBC AUTOMATED: CPT | Performed by: INTERNAL MEDICINE

## 2024-06-11 PROCEDURE — 85610 PROTHROMBIN TIME: CPT | Performed by: INTERNAL MEDICINE

## 2024-06-11 PROCEDURE — 25010000002 FUROSEMIDE PER 20 MG: Performed by: INTERNAL MEDICINE

## 2024-06-11 PROCEDURE — 25010000002 POTASSIUM CHLORIDE 10 MEQ/100ML SOLUTION: Performed by: EMERGENCY MEDICINE

## 2024-06-11 PROCEDURE — 80053 COMPREHEN METABOLIC PANEL: CPT | Performed by: INTERNAL MEDICINE

## 2024-06-11 PROCEDURE — 93306 TTE W/DOPPLER COMPLETE: CPT

## 2024-06-11 PROCEDURE — 25010000002 ALBUMIN HUMAN 25% PER 50 ML: Performed by: INTERNAL MEDICINE

## 2024-06-11 PROCEDURE — 94640 AIRWAY INHALATION TREATMENT: CPT

## 2024-06-11 PROCEDURE — 87324 CLOSTRIDIUM AG IA: CPT | Performed by: FAMILY MEDICINE

## 2024-06-11 RX ORDER — ACETAMINOPHEN 325 MG/1
650 TABLET ORAL EVERY 4 HOURS PRN
Status: DISCONTINUED | OUTPATIENT
Start: 2024-06-11 | End: 2024-06-14 | Stop reason: HOSPADM

## 2024-06-11 RX ORDER — AMOXICILLIN 250 MG
2 CAPSULE ORAL 2 TIMES DAILY
Status: DISCONTINUED | OUTPATIENT
Start: 2024-06-11 | End: 2024-06-12

## 2024-06-11 RX ORDER — CYCLOBENZAPRINE HCL 10 MG
5 TABLET ORAL 3 TIMES DAILY PRN
Status: DISCONTINUED | OUTPATIENT
Start: 2024-06-11 | End: 2024-06-14 | Stop reason: HOSPADM

## 2024-06-11 RX ORDER — POLYETHYLENE GLYCOL 3350 17 G/17G
17 POWDER, FOR SOLUTION ORAL DAILY PRN
Status: DISCONTINUED | OUTPATIENT
Start: 2024-06-11 | End: 2024-06-12

## 2024-06-11 RX ORDER — ATORVASTATIN CALCIUM 10 MG/1
10 TABLET, FILM COATED ORAL DAILY
Status: DISCONTINUED | OUTPATIENT
Start: 2024-06-11 | End: 2024-06-14 | Stop reason: HOSPADM

## 2024-06-11 RX ORDER — MONTELUKAST SODIUM 10 MG/1
10 TABLET ORAL NIGHTLY
Status: DISCONTINUED | OUTPATIENT
Start: 2024-06-11 | End: 2024-06-14 | Stop reason: HOSPADM

## 2024-06-11 RX ORDER — ACETAMINOPHEN 160 MG/5ML
650 SOLUTION ORAL EVERY 4 HOURS PRN
Status: DISCONTINUED | OUTPATIENT
Start: 2024-06-11 | End: 2024-06-14 | Stop reason: HOSPADM

## 2024-06-11 RX ORDER — SPIRONOLACTONE 25 MG/1
50 TABLET ORAL DAILY
Status: DISCONTINUED | OUTPATIENT
Start: 2024-06-11 | End: 2024-06-14 | Stop reason: HOSPADM

## 2024-06-11 RX ORDER — BISACODYL 10 MG
10 SUPPOSITORY, RECTAL RECTAL DAILY PRN
Status: DISCONTINUED | OUTPATIENT
Start: 2024-06-11 | End: 2024-06-12

## 2024-06-11 RX ORDER — ALBUMIN (HUMAN) 12.5 G/50ML
25 SOLUTION INTRAVENOUS 3 TIMES DAILY
Status: DISCONTINUED | OUTPATIENT
Start: 2024-06-11 | End: 2024-06-13

## 2024-06-11 RX ORDER — IPRATROPIUM BROMIDE AND ALBUTEROL SULFATE 2.5; .5 MG/3ML; MG/3ML
3 SOLUTION RESPIRATORY (INHALATION) EVERY 4 HOURS PRN
Status: DISCONTINUED | OUTPATIENT
Start: 2024-06-11 | End: 2024-06-14 | Stop reason: HOSPADM

## 2024-06-11 RX ORDER — FUROSEMIDE 10 MG/ML
20 INJECTION INTRAMUSCULAR; INTRAVENOUS DAILY
Status: DISCONTINUED | OUTPATIENT
Start: 2024-06-11 | End: 2024-06-13

## 2024-06-11 RX ORDER — ENOXAPARIN SODIUM 100 MG/ML
40 INJECTION SUBCUTANEOUS EVERY 24 HOURS
Status: DISCONTINUED | OUTPATIENT
Start: 2024-06-11 | End: 2024-06-14 | Stop reason: HOSPADM

## 2024-06-11 RX ORDER — FOLIC ACID 1 MG/1
1 TABLET ORAL DAILY
Status: DISCONTINUED | OUTPATIENT
Start: 2024-06-11 | End: 2024-06-14 | Stop reason: HOSPADM

## 2024-06-11 RX ORDER — SODIUM CHLORIDE 0.9 % (FLUSH) 0.9 %
10 SYRINGE (ML) INJECTION AS NEEDED
Status: DISCONTINUED | OUTPATIENT
Start: 2024-06-11 | End: 2024-06-14 | Stop reason: HOSPADM

## 2024-06-11 RX ORDER — FLUTICASONE PROPIONATE 50 MCG
2 SPRAY, SUSPENSION (ML) NASAL DAILY
Status: DISCONTINUED | OUTPATIENT
Start: 2024-06-11 | End: 2024-06-14 | Stop reason: HOSPADM

## 2024-06-11 RX ORDER — ACETAMINOPHEN 650 MG/1
650 SUPPOSITORY RECTAL EVERY 4 HOURS PRN
Status: DISCONTINUED | OUTPATIENT
Start: 2024-06-11 | End: 2024-06-14 | Stop reason: HOSPADM

## 2024-06-11 RX ORDER — BISACODYL 5 MG/1
5 TABLET, DELAYED RELEASE ORAL DAILY PRN
Status: DISCONTINUED | OUTPATIENT
Start: 2024-06-11 | End: 2024-06-12

## 2024-06-11 RX ORDER — BUDESONIDE 0.5 MG/2ML
0.5 INHALANT ORAL
Status: DISCONTINUED | OUTPATIENT
Start: 2024-06-11 | End: 2024-06-14

## 2024-06-11 RX ORDER — VENLAFAXINE HYDROCHLORIDE 150 MG/1
150 CAPSULE, EXTENDED RELEASE ORAL
Status: DISCONTINUED | OUTPATIENT
Start: 2024-06-11 | End: 2024-06-14 | Stop reason: HOSPADM

## 2024-06-11 RX ORDER — ONDANSETRON 2 MG/ML
4 INJECTION INTRAMUSCULAR; INTRAVENOUS EVERY 6 HOURS PRN
Status: DISCONTINUED | OUTPATIENT
Start: 2024-06-11 | End: 2024-06-14 | Stop reason: HOSPADM

## 2024-06-11 RX ORDER — SODIUM CHLORIDE 9 MG/ML
40 INJECTION, SOLUTION INTRAVENOUS AS NEEDED
Status: DISCONTINUED | OUTPATIENT
Start: 2024-06-11 | End: 2024-06-14 | Stop reason: HOSPADM

## 2024-06-11 RX ORDER — SODIUM CHLORIDE 0.9 % (FLUSH) 0.9 %
10 SYRINGE (ML) INJECTION EVERY 12 HOURS SCHEDULED
Status: DISCONTINUED | OUTPATIENT
Start: 2024-06-11 | End: 2024-06-14 | Stop reason: HOSPADM

## 2024-06-11 RX ADMIN — Medication 10 ML: at 09:43

## 2024-06-11 RX ADMIN — MONTELUKAST 10 MG: 10 TABLET, FILM COATED ORAL at 21:04

## 2024-06-11 RX ADMIN — FLUTICASONE PROPIONATE 2 SPRAY: 50 SPRAY, METERED NASAL at 09:39

## 2024-06-11 RX ADMIN — SPIRONOLACTONE 50 MG: 25 TABLET, FILM COATED ORAL at 09:39

## 2024-06-11 RX ADMIN — POTASSIUM CHLORIDE 10 MEQ: 7.46 INJECTION, SOLUTION INTRAVENOUS at 00:37

## 2024-06-11 RX ADMIN — ATORVASTATIN CALCIUM 10 MG: 10 TABLET, FILM COATED ORAL at 09:39

## 2024-06-11 RX ADMIN — POTASSIUM CHLORIDE 10 MEQ: 7.46 INJECTION, SOLUTION INTRAVENOUS at 04:07

## 2024-06-11 RX ADMIN — FOLIC ACID 1 MG: 1 TABLET ORAL at 09:42

## 2024-06-11 RX ADMIN — FUROSEMIDE 20 MG: 10 INJECTION, SOLUTION INTRAMUSCULAR; INTRAVENOUS at 09:42

## 2024-06-11 RX ADMIN — Medication 10 ML: at 23:34

## 2024-06-11 RX ADMIN — POTASSIUM CHLORIDE 10 MEQ: 7.46 INJECTION, SOLUTION INTRAVENOUS at 02:57

## 2024-06-11 RX ADMIN — ALBUMIN (HUMAN) 25 G: 0.25 INJECTION, SOLUTION INTRAVENOUS at 18:02

## 2024-06-11 RX ADMIN — ALBUMIN (HUMAN) 25 G: 0.25 INJECTION, SOLUTION INTRAVENOUS at 21:04

## 2024-06-11 RX ADMIN — VENLAFAXINE HYDROCHLORIDE 150 MG: 150 CAPSULE, EXTENDED RELEASE ORAL at 08:10

## 2024-06-11 RX ADMIN — Medication 10 ML: at 21:04

## 2024-06-11 RX ADMIN — POTASSIUM CHLORIDE 10 MEQ: 7.46 INJECTION, SOLUTION INTRAVENOUS at 04:55

## 2024-06-11 RX ADMIN — POTASSIUM CHLORIDE 10 MEQ: 7.46 INJECTION, SOLUTION INTRAVENOUS at 01:48

## 2024-06-11 RX ADMIN — BUDESONIDE 0.5 MG: 0.5 INHALANT RESPIRATORY (INHALATION) at 19:06

## 2024-06-11 RX ADMIN — THIAMINE HCL TAB 100 MG 100 MG: 100 TAB at 09:39

## 2024-06-11 RX ADMIN — BUDESONIDE 0.5 MG: 0.5 INHALANT RESPIRATORY (INHALATION) at 06:54

## 2024-06-11 RX ADMIN — ONDANSETRON 4 MG: 2 INJECTION INTRAMUSCULAR; INTRAVENOUS at 23:34

## 2024-06-11 NOTE — ED PROVIDER NOTES
EMERGENCY DEPARTMENT ENCOUNTER    Pt Name: Nicole Silva  MRN: [id number]  Pt :   1965  Room Number:  15/15  Date of encounter:  6/10/2024  PCP: Aleksandra Andres APRN  ED Provider: Henrique Dodge MD    Historian: Patient      HPI:  Chief Complaint: Arm and leg swelling        Context: Nicole Silva is a 58 y.o. female who presents to the ED c/o arm and leg swelling.  Patient has a past medical history significant for hypertension and COPD.  She was hospitalized here May 28 through the  for left lower extremity nonhealing wound and surrounding cellulitis.  During this hospitalization she was noted to have norovirus.  Patient says while hospitalized she noted that she began having some bilateral upper extremity swelling.  She says since then the bilateral upper extremity swelling has gotten progressively worse and now also involves her abdomen and bilateral lower extremities.  She says that she has exertional dyspnea but denies orthopnea or paroxysmal nocturnal dyspnea.  She says she has some associated intermittent chest discomfort but otherwise has no chest pain.  She denies having any dyspnea at rest.  She says she has been urinating normally.  She says her diarrhea has resolved.  She denies any known history of heart failure, renal conditions or any liver conditions.      PAST MEDICAL HISTORY  Past Medical History:   Diagnosis Date    COPD (chronic obstructive pulmonary disease)     Hypertension          PAST SURGICAL HISTORY  Past Surgical History:   Procedure Laterality Date     SECTION  ,         FAMILY HISTORY  Family History   Problem Relation Age of Onset    Ovarian cancer Mother     Breast cancer Neg Hx          SOCIAL HISTORY  Social History     Socioeconomic History    Marital status:    Tobacco Use    Smoking status: Former     Types: Cigarettes     Passive exposure: Past    Smokeless tobacco: Never   Vaping Use    Vaping status: Never Used   Substance and Sexual  Activity    Alcohol use: No    Drug use: No    Sexual activity: Yes     Partners: Male     Birth control/protection: Post-menopausal         ALLERGIES  Patient has no known allergies.        REVIEW OF SYSTEMS    All systems reviewed and negative except for those discussed in HPI.       PHYSICAL EXAM    I have reviewed the triage vital signs and nursing notes.    ED Triage Vitals [06/10/24 2124]   Temp Heart Rate Resp BP SpO2   97.8 °F (36.6 °C) (!) 126 18 131/68 99 %      Temp src Heart Rate Source Patient Position BP Location FiO2 (%)   Oral Monitor Sitting Left arm --         General: Moderate acute distress  Skin: Healing wound to the left distal lateral lower extremity with no surrounding erythema or warmth.  Head: normocephalic, atraumatic  Eyes: Pupils equally round and reactive to light.  Nose: normal nasal mucosa, no visible deformity.  Mouth: moist mucous membranes.  Neck: supple.  Chest: no retractions, no visible deformity  Cardiovascular: Tachycardic, regular rhythm  Lungs: clear to auscultation bilaterally.  Abdomen: soft, non-tender, moderately-distended. No rebound tenderness, no guarding.  No peritonitis.  Positive fluid wave.  Extremities: 2+ pitting edema about the bilateral distal upper and lower extremities.  Edema about the lower extremities encompasses the entire bilateral lower extremities.  Neuro:  alert and oriented x3, no focal neurological deficits.  Psych:  appropriate mood and behavior.        LAB RESULTS  Recent Results (from the past 24 hour(s))   Comprehensive Metabolic Panel    Collection Time: 06/10/24 10:28 PM    Specimen: Arm, Left; Blood   Result Value Ref Range    Glucose 129 (H) 65 - 99 mg/dL    BUN 4 (L) 6 - 20 mg/dL    Creatinine 0.74 0.57 - 1.00 mg/dL    Sodium 136 136 - 145 mmol/L    Potassium 2.8 (L) 3.5 - 5.2 mmol/L    Chloride 102 98 - 107 mmol/L    CO2 20.1 (L) 22.0 - 29.0 mmol/L    Calcium 7.1 (L) 8.6 - 10.5 mg/dL    Total Protein 5.2 (L) 6.0 - 8.5 g/dL    Albumin 2.4  (L) 3.5 - 5.2 g/dL    ALT (SGPT) 9 1 - 33 U/L    AST (SGOT) 34 (H) 1 - 32 U/L    Alkaline Phosphatase 237 (H) 39 - 117 U/L    Total Bilirubin 0.3 0.0 - 1.2 mg/dL    Globulin 2.8 gm/dL    A/G Ratio 0.9 g/dL    BUN/Creatinine Ratio 5.4 (L) 7.0 - 25.0    Anion Gap 13.9 5.0 - 15.0 mmol/L    eGFR 94.5 >60.0 mL/min/1.73   CBC Auto Differential    Collection Time: 06/10/24 10:28 PM    Specimen: Arm, Left; Blood   Result Value Ref Range    WBC 14.30 (H) 3.40 - 10.80 10*3/mm3    RBC 2.84 (L) 3.77 - 5.28 10*6/mm3    Hemoglobin 9.2 (L) 12.0 - 15.9 g/dL    Hematocrit 28.6 (L) 34.0 - 46.6 %    .7 (H) 79.0 - 97.0 fL    MCH 32.4 26.6 - 33.0 pg    MCHC 32.2 31.5 - 35.7 g/dL    RDW 15.6 (H) 12.3 - 15.4 %    RDW-SD 58.0 (H) 37.0 - 54.0 fl    MPV 10.5 6.0 - 12.0 fL    Platelets 247 140 - 450 10*3/mm3    Neutrophil % 80.0 (H) 42.7 - 76.0 %    Lymphocyte % 10.6 (L) 19.6 - 45.3 %    Monocyte % 7.5 5.0 - 12.0 %    Eosinophil % 1.1 0.3 - 6.2 %    Basophil % 0.3 0.0 - 1.5 %    Immature Grans % 0.5 0.0 - 0.5 %    Neutrophils, Absolute 11.43 (H) 1.70 - 7.00 10*3/mm3    Lymphocytes, Absolute 1.52 0.70 - 3.10 10*3/mm3    Monocytes, Absolute 1.07 (H) 0.10 - 0.90 10*3/mm3    Eosinophils, Absolute 0.16 0.00 - 0.40 10*3/mm3    Basophils, Absolute 0.05 0.00 - 0.20 10*3/mm3    Immature Grans, Absolute 0.07 (H) 0.00 - 0.05 10*3/mm3    nRBC 0.0 0.0 - 0.2 /100 WBC   BNP    Collection Time: 06/10/24 10:28 PM    Specimen: Arm, Left; Blood   Result Value Ref Range    proBNP 2,249.0 (H) 0.0 - 900.0 pg/mL   High Sensitivity Troponin T    Collection Time: 06/10/24 10:28 PM    Specimen: Arm, Left; Blood   Result Value Ref Range    HS Troponin T 19 (H) <14 ng/L   Magnesium    Collection Time: 06/10/24 10:28 PM    Specimen: Arm, Left; Blood   Result Value Ref Range    Magnesium 0.9 (C) 1.6 - 2.6 mg/dL   High Sensitivity Troponin T 2Hr    Collection Time: 06/11/24 12:24 AM    Specimen: Blood   Result Value Ref Range    HS Troponin T 19 (H) <14 ng/L     Troponin T Delta 0 >=-4 - <+4 ng/L       If labs were ordered, I independently reviewed the results and considered them in treating the patient.  See medical decision making discussion section for my interpretation of lab results.        RADIOLOGY  No Radiology Exams Resulted Within Past 24 Hours    I ordered and independently reviewed the above noted radiographic studies.  See radiologist's dictation for official interpretation.    Per my independent reading: Chest radiograph is obtained and based on my independent reading demonstrates blunting of the costophrenic angles bilaterally but otherwise is negative for acute cardiopulmonary findings.            PROCEDURES    Procedures    ECG 12 Lead Tachycardia   Final Result          MEDICATIONS GIVEN IN ER    Medications   potassium chloride 10 mEq in 100 mL IVPB (10 mEq Intravenous New Bag 6/11/24 0257)   sodium chloride 0.9 % flush 10 mL ( Intravenous Canceled Entry 6/11/24 0112)   sodium chloride 0.9 % flush 10 mL (has no administration in time range)   sodium chloride 0.9 % infusion 40 mL (has no administration in time range)   acetaminophen (TYLENOL) tablet 650 mg (has no administration in time range)     Or   acetaminophen (TYLENOL) 160 MG/5ML oral solution 650 mg (has no administration in time range)     Or   acetaminophen (TYLENOL) suppository 650 mg (has no administration in time range)   ondansetron (ZOFRAN) injection 4 mg (has no administration in time range)   Pharmacy to Dose enoxaparin (LOVENOX) (has no administration in time range)   sennosides-docusate (PERICOLACE) 8.6-50 MG per tablet 2 tablet (0 tablets Oral Hold 6/11/24 0111)     And   polyethylene glycol (MIRALAX) packet 17 g (has no administration in time range)     And   bisacodyl (DULCOLAX) EC tablet 5 mg (has no administration in time range)     And   bisacodyl (DULCOLAX) suppository 10 mg (has no administration in time range)   Magnesium Standard Dose Replacement - Follow Nurse / BPA Driven  Protocol (has no administration in time range)   Potassium Replacement - Follow Nurse / BPA Driven Protocol (has no administration in time range)   furosemide (LASIX) injection 20 mg (has no administration in time range)   spironolactone (ALDACTONE) tablet 50 mg (has no administration in time range)   atorvastatin (LIPITOR) tablet 10 mg (has no administration in time range)   cyclobenzaprine (FLEXERIL) tablet 5 mg (has no administration in time range)   fluticasone (FLONASE) 50 MCG/ACT nasal spray 2 spray (has no administration in time range)   folic acid (FOLVITE) tablet 1 mg (has no administration in time range)   montelukast (SINGULAIR) tablet 10 mg (has no administration in time range)   thiamine (VITAMIN B-1) tablet 100 mg (has no administration in time range)   venlafaxine XR (EFFEXOR-XR) 24 hr capsule 150 mg (has no administration in time range)   budesonide (PULMICORT) nebulizer solution 0.5 mg ( Nebulization Canceled Entry 6/11/24 0134)   ipratropium-albuterol (DUO-NEB) nebulizer solution 3 mL (has no administration in time range)   Enoxaparin Sodium (LOVENOX) syringe 40 mg (40 mg Subcutaneous Not Given 6/11/24 0149)   potassium chloride (MICRO-K/KLOR-CON) CR capsule (40 mEq Oral Given 6/10/24 2314)   magnesium sulfate 2g/50 mL (PREMIX) infusion (0 g Intravenous Stopped 6/11/24 0131)         MEDICAL DECISION MAKING, PROGRESS, and CONSULTS    All labs, if obtained, have been independently reviewed by me.  All radiology studies, if obtained, have been reviewed by me and the radiologist dictating the report.  All EKG's, if obtained, have been independently viewed and interpreted by me/my attending physician.      Discussion below represents my analysis of pertinent findings related to patient's condition, differential diagnosis, treatment plan and final disposition.                         Differential diagnosis:    Differential diagnosis is extensive and includes but is not limited to heart failure, renal  failure, hepatic failure, electrolyte derangement, IVC thrombosis, cardiac dysrhythmia, other acute emergency.    Medical Decision Making Discussion:    Patient vitals are reviewed and are markable for tachycardia but otherwise are normal.    EKG is obtained and based on my independent reading demonstrates sinus tachycardia without acute ischemic changes.  Patient has normal NC, QRS intervals.  QTc is slightly prolonged at 460.    Labs reviewed which demonstrate hypokalemia, hypomagnesemia, slight transaminitis and baseline anemia.    I am uncertain as to etiology of her acute anasarca but given her associated hypokalemia, hypomagnesemia requiring IV replacement I think she is appropriate for hospitalization.  Case discussed with Dr. Berger who has accepted the patient for hospitalization.    45 minutes of critical care provided. This time excludes other billable procedures. Time does include preparation of documents, medical consultations, review of old records, and direct bedside care. Patient is at high risk for life-threatening deterioration due to hypokalemia, hypomagnesemia, acute anasarca.      Additional sources:    - External (non-ED) record review: History and physical from May 28, 2024 documenting history of hypertension and COPD.  She presented to the emerged department with a wound on her left foot and ankle.  Reviewed discharge summary from May 31 he was noted to have alcohol withdrawal during this hospitalization.  She was discharged with a diagnosis of nonhealing left foot laceration/cellulitis.    Shared Decision Making:  After my consideration of clinical presentation and any laboratory/radiology studies obtained, I discussed the findings with the patient/patient representative who is in agreement with the treatment plan and the final disposition.   Risks and benefits of discharge and/or observation/admission were discussed.    Orders placed during this visit:  Orders Placed This Encounter    Procedures    XR Chest 1 View    CT Abdomen Pelvis Without Contrast    Comprehensive Metabolic Panel    CBC Auto Differential    BNP    High Sensitivity Troponin T    Magnesium    High Sensitivity Troponin T 2Hr    Potassium    CBC (No Diff)    Comprehensive Metabolic Panel    Protime-INR    Diet: Cardiac; Healthy Heart (2-3 Na+); Fluid Consistency: Thin (IDDSI 0)    Vital Signs    Up With Assistance    Intake & Output    Weigh patient    Oral Care    Saline Lock & Maintain IV Access    Continuous Pulse Oximetry    Code Status and Medical Interventions:    OT Consult: Eval & Treat ADL Performance Below Baseline, Early Mobility Assessment    PT Consult: Eval & Treat As Tolerated; Functional Mobility Below Baseline    Incentive Spirometry    ECG 12 Lead Tachycardia    Adult Transthoracic Echo Complete W/ Cont if Necessary Per Protocol    Insert Peripheral IV    Initiate Observation Status         AS OF 03:37 EDT VITALS:    BP - 114/81  HR - 105  TEMP - 97.8 °F (36.6 °C) (Oral)  O2 SATS - 97%                  DIAGNOSIS  Final diagnoses:   Hypokalemia   Hypomagnesemia   Anasarca         DISPOSITION  Admit      Please note that portions of this document were completed with voice recognition software.        Henrique Dodge MD  06/11/24 0335

## 2024-06-11 NOTE — PAYOR COMM NOTE
"TO:SELWYN   FROM:ZORAN BLISS, RN PHONE [phone]  IN CLINICALS REF# [id number]    Robbie Silva (58 y.o. Female)       Date of Birth   1965    Social Security Number       [place]   [address] [address]    Home Phone   [phone]    MRN   [id number]       [employer]ism   Pent[employer]al    Marital Status                               Admission Date   6/10/24    Admission Type   Emergency    Admitting Provider       Attending Provider   Pat Alatorre DO    Department, Room/Bed   [facility] EMERGENCY DEPARTMENT, 15/15       Discharge Date       Discharge Disposition       Discharge Destination                                 Attending Provider: Pat Alatorre DO    Allergies: No Known Allergies    Isolation: Spore   Infection: Norovirus (24)   Code Status: CPR    Ht: 162.6 cm (64\")   Wt: 65.8 kg (145 lb)    Admission Cmt: None   Principal Problem: Anasarca [R60.1]                   Active Insurance as of 6/10/2024       Primary Coverage       Payor Plan Insurance Group Employer/Plan Group    [place] [id number]       Payor Plan Address Payor Plan Phone Number Payor Plan Fax Number Effective Dates    [employer] [address] [address] [phone]  2021 - None Entered    [facility] [id number]         Subscriber Name Subscriber Birth Date Member ID       ROBBIE SILVA 1965 [id number]                     Emergency Contacts        (Rel.) Home Phone Work Phone Mobile Phone    aye nair (Daughter) [phone] -- --                 History & Physical        Darek Berger MD at 06/10/24 2351            [facility] HOSPITALIST   HISTORY AND PHYSICAL      Name:  Robbie Silva   Age:  58 y.o.  Sex:  female  :  1965  MRN:  [id number]   Visit Number:  [id number]  Admission Date:  6/10/2024  Date Of Service:  06/10/24  Primary Care Physician:  Aleksandra Andres, APRRENAE    Chief " Complaint:     Generalized edema.    History Of Presenting Illness:      Nicole Silva is a 58-year-old female with history of hypertension, COPD was recently discharged from this facility on 2024 after treatment for nonhealing left foot laceration and cellulitis was brought to the emergency room with complaints of bilateral arm and leg swelling.  Since discharge from the hospital when she was also diagnosed with norovirus infection she has started gaining weight progressively.  Patient does have prior history of heavy alcohol use but states that she has not been used alcohol in the last several weeks.  She does live alone  and uses a walker to ambulate.  No prior history of documented liver disease.    In the emergency room, she was afebrile but tachycardic at 126 with initial blood pressure 130/68 and pulse oxygen saturation of 99% on room air.  Initial troponin 19 with a proBNP of 2249.  CMP was remarkable for potassium of 2.8, glucose 129, magnesium 0.9 and albumin of 2.4.  Alkaline phosphatase 237.  Creatinine was normal at 0.7.  CBC was remarkable for a white count of 14, hemoglobin of 9.2 (baseline) .7.  Chest x-ray was unremarkable.  Patient was given IV and oral potassium supplements as well as IV magnesium in the emergency room and was subsequently admitted to the medical floor with telemetry for management of anasarca, hypomagnesemia and hypokalemia.    Review Of Systems:    All systems were reviewed and negative except as mentioned in history of presenting illness, assessment and plan.    Past Medical History: Patient  has a past medical history of COPD (chronic obstructive pulmonary disease) and Hypertension.    Past Surgical History: Patient  has a past surgical history that includes  section (,).    Social History: Patient  reports that she has quit smoking. Her smoking use included cigarettes. She has been exposed to tobacco smoke. She has never used smokeless tobacco. She  reports that she does not drink alcohol and does not use drugs.    Family History:  Patient family history includes Ovarian cancer in her mother.     Allergies:      Patient has no known allergies.    Home Medications:    Prior to Admission Medications       Prescriptions Last Dose Informant Patient Reported? Taking?    albuterol 108 (90 Base) MCG/ACT inhaler   Yes No    Inhale.    atorvastatin (LIPITOR) 10 MG tablet   Yes No    Take 1 tablet by mouth Daily.    cetaphil (CETAPHIL) lotion   No No    Apply  topically to the appropriate area as directed Daily.    collagenase 250 UNIT/GM ointment   No No    Apply 1 Application topically to the appropriate area as directed Daily.    cyclobenzaprine (FLEXERIL) 5 MG tablet   Yes No    Take 1 tablet by mouth 3 (Three) Times a Day As Needed for Muscle Spasms.    fluticasone (FLONASE) 50 MCG/ACT nasal spray   Yes No    2 sprays into the nostril(s) as directed by provider Daily.    fluticasone (FLOVENT HFA) 110 MCG/ACT inhaler   No No    Inhale 1 puff twice daily.  Rinse mouth well after using.    folic acid (FOLVITE) 1 MG tablet   No No    Take 1 tablet by mouth Daily.    levocetirizine (XYZAL) 5 MG tablet   No No    Take 1 tablet by mouth Every Evening for 30 days.    loperamide (IMODIUM) 2 MG capsule   No No    Take 1 capsule by mouth 4 (Four) Times a Day As Needed for Diarrhea.    montelukast (SINGULAIR) 10 MG tablet   No No    Take 1 tablet by mouth Every Night.    Multivitamin tablet tablet   Yes No    multivitamin with minerals tablet tablet   No No    Take 1 tablet by mouth Daily.    progesterone (PROMETRIUM) 100 MG capsule   No No    TAKE ONE CAPSULE BY MOUTH DAILY    Progesterone (PROMETRIUM) 100 MG capsule   Yes No    Take 1 capsule by mouth Every Night.    thiamine (VITAMIN B-1) 100 MG tablet   No No    Take 1 tablet by mouth Daily.    venlafaxine XR (EFFEXOR-XR) 75 MG 24 hr capsule   Yes No    Take 2 capsules by mouth.    vitamin D (ERGOCALCIFEROL) 1.25 MG (95997  "UT) capsule capsule   Yes No     ED Medications:    Medications   potassium chloride 10 mEq in 100 mL IVPB (10 mEq Intravenous New Bag 6/10/24 2340)   potassium chloride (MICRO-K/KLOR-CON) CR capsule (40 mEq Oral Given 6/10/24 2314)   magnesium sulfate 2g/50 mL (PREMIX) infusion (2 g Intravenous New Bag 6/10/24 2334)     Vital Signs:  Temp:  [97.8 °F (36.6 °C)] 97.8 °F (36.6 °C)  Heart Rate:  [114-126] 114  Resp:  [18] 18  BP: (107-131)/(64-68) 108/67        06/10/24  2124   Weight: 65.8 kg (145 lb)     Body mass index is 24.89 kg/m².    Physical Exam:     Most recent vital Signs: /67 (BP Location: Left arm, Patient Position: Sitting)   Pulse 114   Temp 97.8 °F (36.6 °C) (Oral)   Resp 18   Ht 162.6 cm (64\")   Wt 65.8 kg (145 lb)   SpO2 96%   BMI 24.89 kg/m²     Physical Exam  Constitutional:       General: She is not in acute distress.     Appearance: She is obese. She is not ill-appearing.   HENT:      Head: Normocephalic and atraumatic.      Right Ear: External ear normal.      Left Ear: External ear normal.      Nose: Nose normal.      Mouth/Throat:      Mouth: Mucous membranes are moist.   Eyes:      Extraocular Movements: Extraocular movements intact.      Conjunctiva/sclera: Conjunctivae normal.   Cardiovascular:      Rate and Rhythm: Normal rate and regular rhythm.      Pulses: Normal pulses.      Heart sounds: Normal heart sounds. No murmur heard.  Pulmonary:      Effort: Pulmonary effort is normal.      Breath sounds: Normal breath sounds. No wheezing or rales.   Abdominal:      Palpations: Abdomen is soft.      Tenderness: There is no abdominal tenderness. There is no guarding or rebound.      Comments: Obese abdomen with dull flanks.   Musculoskeletal:         General: Normal range of motion.      Cervical back: Neck supple.      Right lower leg: Edema present.      Left lower leg: Edema present.      Comments: 3+ pitting edema noted bilateral lower extremities up to the thighs.  Healing left " dorsal foot laceration noted without any erythema.  Right knee joint surgical scar noted.   Skin:     General: Skin is warm.      Findings: No erythema or rash.   Neurological:      General: No focal deficit present.      Mental Status: She is alert and oriented to person, place, and time. Mental status is at baseline.   Psychiatric:         Mood and Affect: Mood normal.         Behavior: Behavior normal.       Laboratory data:    I have reviewed the labs done in the emergency room.    Results from last 7 days   Lab Units 06/10/24  2228   SODIUM mmol/L 136   POTASSIUM mmol/L 2.8*   CHLORIDE mmol/L 102   CO2 mmol/L 20.1*   BUN mg/dL 4*   CREATININE mg/dL 0.74   CALCIUM mg/dL 7.1*   BILIRUBIN mg/dL 0.3   ALK PHOS U/L 237*   ALT (SGPT) U/L 9   AST (SGOT) U/L 34*   GLUCOSE mg/dL 129*     Results from last 7 days   Lab Units 06/10/24  2228   WBC 10*3/mm3 14.30*   HEMOGLOBIN g/dL 9.2*   HEMATOCRIT % 28.6*   PLATELETS 10*3/mm3 247         Results from last 7 days   Lab Units 06/10/24  2228   HSTROP T ng/L 19*     Results from last 7 days   Lab Units 06/10/24  2228   PROBNP pg/mL 2,249.0*     EKG:      EKG done in the emergency room was reviewed by me.  It shows sinus tachycardia at 115 bpm.  Normal axis.  No significant ST-T changes were noted.    Radiology:    Portable chest x-ray done in the emergency room was reviewed by me.  It shows chronic appearing parenchymal changes without any infiltrates.  Sequela of old granulomatous disease noted on the left mid zone.  Official report is currently pending.    Assessment:    Anasarca with hypoalbuminemia, suspected liver disease.  Suspected alcoholic versus fatty liver disease.  Hypokalemia and hypomagnesemia, POA.  COPD, no exacerbation.  Essential hypertension.  History of alcohol abuse.  Recent history of left foot laceration, healing.    Plan:    Anasarca/hypoalbuminemia.  - I suspect her anasarca is likely secondary to decreased liver function.  She has history of chronic  alcohol use and may also have underlying fatty liver disease and cirrhosis.  - We will obtain CT scan of the abdomen and pelvis without contrast to evaluate liver morphology.  - I will start her on IV Lasix and oral spironolactone.  - If she does have cirrhosis noted on CT scan, she will need further workup for etiology.  - We will also obtain 2D echocardiogram to assess the left ventricular function.    Hypomagnesemia/hypokalemia.  - We will place her on electrolyte replacement protocol.    We will consult physical and occupational therapy.    Risk Assessment: Moderate  DVT Prophylaxis: Enoxaparin  Code Status: Full  Diet: Cardiac      Darek Berger MD  06/10/24  23:51 EDT    Dictated utilizing Dragon dictation.    Electronically signed by Darek Berger MD at 24 0107          Emergency Department Notes        Henrique Dodge MD at 06/10/24 2202           EMERGENCY DEPARTMENT ENCOUNTER    Pt Name: Nicole Silva  MRN: [id number]  Pt :   1965  Room Number:  15/15  Date of encounter:  6/10/2024  PCP: Aleksandra Andres APRN  ED Provider: Henrique Dodge MD    Historian: Patient      HPI:  Chief Complaint: Arm and leg swelling        Context: Nicole Silva is a 58 y.o. female who presents to the ED c/o arm and leg swelling.  Patient has a past medical history significant for hypertension and COPD.  She was hospitalized here May 28 through the  for left lower extremity nonhealing wound and surrounding cellulitis.  During this hospitalization she was noted to have norovirus.  Patient says while hospitalized she noted that she began having some bilateral upper extremity swelling.  She says since then the bilateral upper extremity swelling has gotten progressively worse and now also involves her abdomen and bilateral lower extremities.  She says that she has exertional dyspnea but denies orthopnea or paroxysmal nocturnal dyspnea.  She says she has some associated intermittent chest discomfort but otherwise  has no chest pain.  She denies having any dyspnea at rest.  She says she has been urinating normally.  She says her diarrhea has resolved.  She denies any known history of heart failure, renal conditions or any liver conditions.      PAST MEDICAL HISTORY  Past Medical History:   Diagnosis Date    COPD (chronic obstructive pulmonary disease)     Hypertension          PAST SURGICAL HISTORY  Past Surgical History:   Procedure Laterality Date     SECTION  ,         FAMILY HISTORY  Family History   Problem Relation Age of Onset    Ovarian cancer Mother     Breast cancer Neg Hx          SOCIAL HISTORY  Social History     Socioeconomic History    Marital status:    Tobacco Use    Smoking status: Former     Types: Cigarettes     Passive exposure: Past    Smokeless tobacco: Never   Vaping Use    Vaping status: Never Used   Substance and Sexual Activity    Alcohol use: No    Drug use: No    Sexual activity: Yes     Partners: Male     Birth control/protection: Post-menopausal         ALLERGIES  Patient has no known allergies.        REVIEW OF SYSTEMS    All systems reviewed and negative except for those discussed in HPI.       PHYSICAL EXAM    I have reviewed the triage vital signs and nursing notes.    ED Triage Vitals [06/10/24 2124]   Temp Heart Rate Resp BP SpO2   97.8 °F (36.6 °C) (!) 126 18 131/68 99 %      Temp src Heart Rate Source Patient Position BP Location FiO2 (%)   Oral Monitor Sitting Left arm --         General: Moderate acute distress  Skin: Healing wound to the left distal lateral lower extremity with no surrounding erythema or warmth.  Head: normocephalic, atraumatic  Eyes: Pupils equally round and reactive to light.  Nose: normal nasal mucosa, no visible deformity.  Mouth: moist mucous membranes.  Neck: supple.  Chest: no retractions, no visible deformity  Cardiovascular: Tachycardic, regular rhythm  Lungs: clear to auscultation bilaterally.  Abdomen: soft, non-tender,  moderately-distended. No rebound tenderness, no guarding.  No peritonitis.  Positive fluid wave.  Extremities: 2+ pitting edema about the bilateral distal upper and lower extremities.  Edema about the lower extremities encompasses the entire bilateral lower extremities.  Neuro:  alert and oriented x3, no focal neurological deficits.  Psych:  appropriate mood and behavior.        LAB RESULTS  Recent Results (from the past 24 hour(s))   Comprehensive Metabolic Panel    Collection Time: 06/10/24 10:28 PM    Specimen: Arm, Left; Blood   Result Value Ref Range    Glucose 129 (H) 65 - 99 mg/dL    BUN 4 (L) 6 - 20 mg/dL    Creatinine 0.74 0.57 - 1.00 mg/dL    Sodium 136 136 - 145 mmol/L    Potassium 2.8 (L) 3.5 - 5.2 mmol/L    Chloride 102 98 - 107 mmol/L    CO2 20.1 (L) 22.0 - 29.0 mmol/L    Calcium 7.1 (L) 8.6 - 10.5 mg/dL    Total Protein 5.2 (L) 6.0 - 8.5 g/dL    Albumin 2.4 (L) 3.5 - 5.2 g/dL    ALT (SGPT) 9 1 - 33 U/L    AST (SGOT) 34 (H) 1 - 32 U/L    Alkaline Phosphatase 237 (H) 39 - 117 U/L    Total Bilirubin 0.3 0.0 - 1.2 mg/dL    Globulin 2.8 gm/dL    A/G Ratio 0.9 g/dL    BUN/Creatinine Ratio 5.4 (L) 7.0 - 25.0    Anion Gap 13.9 5.0 - 15.0 mmol/L    eGFR 94.5 >60.0 mL/min/1.73   CBC Auto Differential    Collection Time: 06/10/24 10:28 PM    Specimen: Arm, Left; Blood   Result Value Ref Range    WBC 14.30 (H) 3.40 - 10.80 10*3/mm3    RBC 2.84 (L) 3.77 - 5.28 10*6/mm3    Hemoglobin 9.2 (L) 12.0 - 15.9 g/dL    Hematocrit 28.6 (L) 34.0 - 46.6 %    .7 (H) 79.0 - 97.0 fL    MCH 32.4 26.6 - 33.0 pg    MCHC 32.2 31.5 - 35.7 g/dL    RDW 15.6 (H) 12.3 - 15.4 %    RDW-SD 58.0 (H) 37.0 - 54.0 fl    MPV 10.5 6.0 - 12.0 fL    Platelets 247 140 - 450 10*3/mm3    Neutrophil % 80.0 (H) 42.7 - 76.0 %    Lymphocyte % 10.6 (L) 19.6 - 45.3 %    Monocyte % 7.5 5.0 - 12.0 %    Eosinophil % 1.1 0.3 - 6.2 %    Basophil % 0.3 0.0 - 1.5 %    Immature Grans % 0.5 0.0 - 0.5 %    Neutrophils, Absolute 11.43 (H) 1.70 - 7.00 10*3/mm3     Lymphocytes, Absolute 1.52 0.70 - 3.10 10*3/mm3    Monocytes, Absolute 1.07 (H) 0.10 - 0.90 10*3/mm3    Eosinophils, Absolute 0.16 0.00 - 0.40 10*3/mm3    Basophils, Absolute 0.05 0.00 - 0.20 10*3/mm3    Immature Grans, Absolute 0.07 (H) 0.00 - 0.05 10*3/mm3    nRBC 0.0 0.0 - 0.2 /100 WBC   BNP    Collection Time: 06/10/24 10:28 PM    Specimen: Arm, Left; Blood   Result Value Ref Range    proBNP 2,249.0 (H) 0.0 - 900.0 pg/mL   High Sensitivity Troponin T    Collection Time: 06/10/24 10:28 PM    Specimen: Arm, Left; Blood   Result Value Ref Range    HS Troponin T 19 (H) <14 ng/L   Magnesium    Collection Time: 06/10/24 10:28 PM    Specimen: Arm, Left; Blood   Result Value Ref Range    Magnesium 0.9 (C) 1.6 - 2.6 mg/dL   High Sensitivity Troponin T 2Hr    Collection Time: 06/11/24 12:24 AM    Specimen: Blood   Result Value Ref Range    HS Troponin T 19 (H) <14 ng/L    Troponin T Delta 0 >=-4 - <+4 ng/L       If labs were ordered, I independently reviewed the results and considered them in treating the patient.  See medical decision making discussion section for my interpretation of lab results.        RADIOLOGY  No Radiology Exams Resulted Within Past 24 Hours    I ordered and independently reviewed the above noted radiographic studies.  See radiologist's dictation for official interpretation.    Per my independent reading: Chest radiograph is obtained and based on my independent reading demonstrates blunting of the costophrenic angles bilaterally but otherwise is negative for acute cardiopulmonary findings.            PROCEDURES    Procedures    ECG 12 Lead Tachycardia   Final Result          MEDICATIONS GIVEN IN ER    Medications   potassium chloride 10 mEq in 100 mL IVPB (10 mEq Intravenous New Bag 6/11/24 0257)   sodium chloride 0.9 % flush 10 mL ( Intravenous Canceled Entry 6/11/24 0112)   sodium chloride 0.9 % flush 10 mL (has no administration in time range)   sodium chloride 0.9 % infusion 40 mL (has no  administration in time range)   acetaminophen (TYLENOL) tablet 650 mg (has no administration in time range)     Or   acetaminophen (TYLENOL) 160 MG/5ML oral solution 650 mg (has no administration in time range)     Or   acetaminophen (TYLENOL) suppository 650 mg (has no administration in time range)   ondansetron (ZOFRAN) injection 4 mg (has no administration in time range)   Pharmacy to Dose enoxaparin (LOVENOX) (has no administration in time range)   sennosides-docusate (PERICOLACE) 8.6-50 MG per tablet 2 tablet (0 tablets Oral Hold 6/11/24 0111)     And   polyethylene glycol (MIRALAX) packet 17 g (has no administration in time range)     And   bisacodyl (DULCOLAX) EC tablet 5 mg (has no administration in time range)     And   bisacodyl (DULCOLAX) suppository 10 mg (has no administration in time range)   Magnesium Standard Dose Replacement - Follow Nurse / BPA Driven Protocol (has no administration in time range)   Potassium Replacement - Follow Nurse / BPA Driven Protocol (has no administration in time range)   furosemide (LASIX) injection 20 mg (has no administration in time range)   spironolactone (ALDACTONE) tablet 50 mg (has no administration in time range)   atorvastatin (LIPITOR) tablet 10 mg (has no administration in time range)   cyclobenzaprine (FLEXERIL) tablet 5 mg (has no administration in time range)   fluticasone (FLONASE) 50 MCG/ACT nasal spray 2 spray (has no administration in time range)   folic acid (FOLVITE) tablet 1 mg (has no administration in time range)   montelukast (SINGULAIR) tablet 10 mg (has no administration in time range)   thiamine (VITAMIN B-1) tablet 100 mg (has no administration in time range)   venlafaxine XR (EFFEXOR-XR) 24 hr capsule 150 mg (has no administration in time range)   budesonide (PULMICORT) nebulizer solution 0.5 mg ( Nebulization Canceled Entry 6/11/24 0134)   ipratropium-albuterol (DUO-NEB) nebulizer solution 3 mL (has no administration in time range)    Enoxaparin Sodium (LOVENOX) syringe 40 mg (40 mg Subcutaneous Not Given 6/11/24 0149)   potassium chloride (MICRO-K/KLOR-CON) CR capsule (40 mEq Oral Given 6/10/24 5404)   magnesium sulfate 2g/50 mL (PREMIX) infusion (0 g Intravenous Stopped 6/11/24 0131)         MEDICAL DECISION MAKING, PROGRESS, and CONSULTS    All labs, if obtained, have been independently reviewed by me.  All radiology studies, if obtained, have been reviewed by me and the radiologist dictating the report.  All EKG's, if obtained, have been independently viewed and interpreted by me/my attending physician.      Discussion below represents my analysis of pertinent findings related to patient's condition, differential diagnosis, treatment plan and final disposition.                         Differential diagnosis:    Differential diagnosis is extensive and includes but is not limited to heart failure, renal failure, hepatic failure, electrolyte derangement, IVC thrombosis, cardiac dysrhythmia, other acute emergency.    Medical Decision Making Discussion:    Patient vitals are reviewed and are markable for tachycardia but otherwise are normal.    EKG is obtained and based on my independent reading demonstrates sinus tachycardia without acute ischemic changes.  Patient has normal UT, QRS intervals.  QTc is slightly prolonged at 460.    Labs reviewed which demonstrate hypokalemia, hypomagnesemia, slight transaminitis and baseline anemia.    I am uncertain as to etiology of her acute anasarca but given her associated hypokalemia, hypomagnesemia requiring IV replacement I think she is appropriate for hospitalization.  Case discussed with Dr. Berger who has accepted the patient for hospitalization.    45 minutes of critical care provided. This time excludes other billable procedures. Time does include preparation of documents, medical consultations, review of old records, and direct bedside care. Patient is at high risk for life-threatening deterioration  due to hypokalemia, hypomagnesemia, acute anasarca.      Additional sources:    - External (non-ED) record review: History and physical from May 28, 2024 documenting history of hypertension and COPD.  She presented to the emerged department with a wound on her left foot and ankle.  Reviewed discharge summary from May 31 he was noted to have alcohol withdrawal during this hospitalization.  She was discharged with a diagnosis of nonhealing left foot laceration/cellulitis.    Shared Decision Making:  After my consideration of clinical presentation and any laboratory/radiology studies obtained, I discussed the findings with the patient/patient representative who is in agreement with the treatment plan and the final disposition.   Risks and benefits of discharge and/or observation/admission were discussed.    Orders placed during this visit:  Orders Placed This Encounter   Procedures    XR Chest 1 View    CT Abdomen Pelvis Without Contrast    Comprehensive Metabolic Panel    CBC Auto Differential    BNP    High Sensitivity Troponin T    Magnesium    High Sensitivity Troponin T 2Hr    Potassium    CBC (No Diff)    Comprehensive Metabolic Panel    Protime-INR    Diet: Cardiac; Healthy Heart (2-3 Na+); Fluid Consistency: Thin (IDDSI 0)    Vital Signs    Up With Assistance    Intake & Output    Weigh patient    Oral Care    Saline Lock & Maintain IV Access    Continuous Pulse Oximetry    Code Status and Medical Interventions:    OT Consult: Eval & Treat ADL Performance Below Baseline, Early Mobility Assessment    PT Consult: Eval & Treat As Tolerated; Functional Mobility Below Baseline    Incentive Spirometry    ECG 12 Lead Tachycardia    Adult Transthoracic Echo Complete W/ Cont if Necessary Per Protocol    Insert Peripheral IV    Initiate Observation Status         AS OF 03:37 EDT VITALS:    BP - 114/81  HR - 105  TEMP - 97.8 °F (36.6 °C) (Oral)  O2 SATS - 97%                  DIAGNOSIS  Final diagnoses:   Hypokalemia    Hypomagnesemia   Anasarca         DISPOSITION  Admit      Please note that portions of this document were completed with voice recognition software.        Henrique Dodge MD  06/11/24 0337      Electronically signed by Henrique Dodge MD at 06/11/24 0337       Vital Signs (last day)       Date/Time Temp Temp src Pulse Resp BP Patient Position SpO2    06/11/24 0803 -- -- -- -- 115/73 -- --    06/11/24 0748 -- -- -- -- -- -- 97    06/11/24 0654 -- -- -- 18 -- Sitting --    06/11/24 0651 -- -- 108 18 111/75 Sitting 97    06/11/24 0600 -- -- 105 -- 111/75 -- --    06/11/24 0520 -- -- 116 18 114/61 -- 97    06/11/24 0202 -- -- 105 18 114/81 -- 97    06/11/24 0037 -- -- 112 18 101/62 -- 95    06/11/24 00:35:12 -- -- 112 18 101/62 Sitting 96    06/10/24 2257 -- -- 114 18 108/67 Sitting 96    06/10/24 2231 -- -- 116 18 107/64 Sitting 98    06/10/24 2124 97.8 (36.6) Oral 126 18 131/68 Sitting 99          Current Facility-Administered Medications   Medication Dose Route Frequency Provider Last Rate Last Admin    acetaminophen (TYLENOL) tablet 650 mg  650 mg Oral Q4H PRN Darek Berger MD        Or    acetaminophen (TYLENOL) 160 MG/5ML oral solution 650 mg  650 mg Oral Q4H PRN Darek Berger MD        Or    acetaminophen (TYLENOL) suppository 650 mg  650 mg Rectal Q4H PRN Darek Berger MD        atorvastatin (LIPITOR) tablet 10 mg  10 mg Oral Daily Darek Berger MD        sennosides-docusate (PERICOLACE) 8.6-50 MG per tablet 2 tablet  2 tablet Oral BID Darek Berger MD        And    polyethylene glycol (MIRALAX) packet 17 g  17 g Oral Daily PRN Darek Berger MD        And    bisacodyl (DULCOLAX) EC tablet 5 mg  5 mg Oral Daily PRN Darek Berger MD        And    bisacodyl (DULCOLAX) suppository 10 mg  10 mg Rectal Daily PRN Darek Berger MD        budesonide (PULMICORT) nebulizer solution 0.5 mg  0.5 mg Nebulization BID - RT Darek Berger MD   0.5 mg at 06/11/24 0654    cyclobenzaprine (FLEXERIL) tablet 5 mg  5 mg Oral TID PRN  Darek Berger MD        Enoxaparin Sodium (LOVENOX) syringe 40 mg  40 mg Subcutaneous Q24H Darek Berger MD        fluticasone (FLONASE) 50 MCG/ACT nasal spray 2 spray  2 spray Nasal Daily Darek Berger MD        folic acid (FOLVITE) tablet 1 mg  1 mg Oral Daily Darek Berger MD        furosemide (LASIX) injection 20 mg  20 mg Intravenous Daily Darek Berger MD        ipratropium-albuterol (DUO-NEB) nebulizer solution 3 mL  3 mL Nebulization Q4H PRN Darek Berger MD        Magnesium Standard Dose Replacement - Follow Nurse / BPA Driven Protocol   Does not apply PRN Darek Berger MD        montelukast (SINGULAIR) tablet 10 mg  10 mg Oral Nightly Darek Berger MD        ondansetron (ZOFRAN) injection 4 mg  4 mg Intravenous Q6H PRN Darek Berger MD        Pharmacy to Dose enoxaparin (LOVENOX)   Does not apply Continuous PRN Darek Berger MD        Potassium Replacement - Follow Nurse / BPA Driven Protocol   Does not apply PRN Darek Berger MD        sodium chloride 0.9 % flush 10 mL  10 mL Intravenous Q12H Darek Berger MD        sodium chloride 0.9 % flush 10 mL  10 mL Intravenous PRN Darek Berger MD        sodium chloride 0.9 % infusion 40 mL  40 mL Intravenous PRN Darek Berger MD        spironolactone (ALDACTONE) tablet 50 mg  50 mg Oral Daily Darek Berger MD        thiamine (VITAMIN B-1) tablet 100 mg  100 mg Oral Daily Darek Berger MD        venlafaxine XR (EFFEXOR-XR) 24 hr capsule 150 mg  150 mg Oral Daily With Breakfast Darek Berger MD   150 mg at 06/11/24 0810     Current Outpatient Medications   Medication Sig Dispense Refill    albuterol 108 (90 Base) MCG/ACT inhaler Inhale.      atorvastatin (LIPITOR) 10 MG tablet Take 1 tablet by mouth Daily.      cetaphil (CETAPHIL) lotion Apply  topically to the appropriate area as directed Daily.      collagenase 250 UNIT/GM ointment Apply 1 Application topically to the appropriate area as directed Daily.      cyclobenzaprine (FLEXERIL) 5 MG tablet Take 1 tablet by  mouth 3 (Three) Times a Day As Needed for Muscle Spasms.      fluticasone (FLONASE) 50 MCG/ACT nasal spray 2 sprays into the nostril(s) as directed by provider Daily.      fluticasone (FLOVENT HFA) 110 MCG/ACT inhaler Inhale 1 puff twice daily.  Rinse mouth well after using. 12 g 0    folic acid (FOLVITE) 1 MG tablet Take 1 tablet by mouth Daily. 30 tablet 0    levocetirizine (XYZAL) 5 MG tablet Take 1 tablet by mouth Every Evening for 30 days. 30 tablet 6    loperamide (IMODIUM) 2 MG capsule Take 1 capsule by mouth 4 (Four) Times a Day As Needed for Diarrhea. 30 capsule 0    montelukast (SINGULAIR) 10 MG tablet Take 1 tablet by mouth Every Night. 30 tablet 3    Multivitamin tablet tablet       multivitamin with minerals tablet tablet Take 1 tablet by mouth Daily. 30 each 0    progesterone (PROMETRIUM) 100 MG capsule TAKE ONE CAPSULE BY MOUTH DAILY 30 capsule 0    Progesterone (PROMETRIUM) 100 MG capsule Take 1 capsule by mouth Every Night.      thiamine (VITAMIN B-1) 100 MG tablet Take 1 tablet by mouth Daily. 30 tablet 0    venlafaxine XR (EFFEXOR-XR) 75 MG 24 hr capsule Take 2 capsules by mouth.      vitamin D (ERGOCALCIFEROL) 1.25 MG (39520 UT) capsule capsule        Lab Results (last 24 hours)       Procedure Component Value Units Date/Time    Comprehensive Metabolic Panel [[id number]]  (Abnormal) Collected: 06/11/24 0646    Specimen: Blood Updated: 06/11/24 0748     Glucose 112 mg/dL      BUN 4 mg/dL      Creatinine 0.62 mg/dL      Sodium 137 mmol/L      Potassium 3.9 mmol/L      Chloride 106 mmol/L      CO2 21.1 mmol/L      Calcium 7.0 mg/dL      Total Protein 5.2 g/dL      Albumin 2.3 g/dL      ALT (SGPT) 9 U/L      AST (SGOT) 46 U/L      Alkaline Phosphatase 243 U/L      Total Bilirubin 0.4 mg/dL      Globulin 2.9 gm/dL      A/G Ratio 0.8 g/dL      BUN/Creatinine Ratio 6.5     Anion Gap 9.9 mmol/L      eGFR 104.0 mL/min/1.73     Narrative:      GFR Normal >60  Chronic Kidney Disease <60  Kidney Failure <15       Protime-INR [[id number]]  (Normal) Collected: 06/11/24 0646    Specimen: Blood Updated: 06/11/24 0736     Protime 14.3 Seconds      INR 1.05    Narrative:      Suggested INR therapeutic range for stable oral anticoagulant therapy:    Low Intensity therapy:   1.5-2.0  Moderate Intensity therapy:   2.0-3.0  High Intensity therapy:   2.5-4.0    CBC (No Diff) [[id number]]  (Abnormal) Collected: 06/11/24 0646    Specimen: Blood Updated: 06/11/24 0723     WBC 12.97 10*3/mm3      RBC 2.87 10*6/mm3      Hemoglobin 9.3 g/dL      Hematocrit 28.6 %      MCV 99.7 fL      MCH 32.4 pg      MCHC 32.5 g/dL      RDW 15.8 %      RDW-SD 58.2 fl      MPV 10.8 fL      Platelets 245 10*3/mm3     High Sensitivity Troponin T 2Hr [[id number]]  (Abnormal) Collected: 06/11/24 0024    Specimen: Blood Updated: 06/11/24 0049     HS Troponin T 19 ng/L      Troponin T Delta 0 ng/L     Narrative:      High Sensitive Troponin T Reference Range:  <14.0 ng/L- Negative Female for AMI  <22.0 ng/L- Negative Male for AMI  >=14 - Abnormal Female indicating possible myocardial injury.  >=22 - Abnormal Male indicating possible myocardial injury.   Clinicians would have to utilize clinical acumen, EKG, Troponin, and serial changes to determine if it is an Acute Myocardial Infarction or myocardial injury due to an underlying chronic condition.         BNP [[id number]]  (Abnormal) Collected: 06/10/24 2228    Specimen: Blood from Arm, Left Updated: 06/10/24 2310     proBNP 2,249.0 pg/mL     Narrative:      This assay is used as an aid in the diagnosis of individuals suspected of having heart failure. It can be used as an aid in the diagnosis of acute decompensated heart failure (ADHF) in patients presenting with signs and symptoms of ADHF to the emergency department (ED). In addition, NT-proBNP of <300 pg/mL indicates ADHF is not likely.    Age Range Result Interpretation  NT-proBNP Concentration (pg/mL:      <50             Positive            >450                    Gray                 300-450                    Negative             <300    50-75           Positive            >900                  Gray                300-900                  Negative            <300      >75             Positive            >1800                  Gray                300-1800                  Negative            <300    High Sensitivity Troponin T [[id number]]  (Abnormal) Collected: 06/10/24 2228    Specimen: Blood from Arm, Left Updated: 06/10/24 2259     HS Troponin T 19 ng/L     Narrative:      High Sensitive Troponin T Reference Range:  <14.0 ng/L- Negative Female for AMI  <22.0 ng/L- Negative Male for AMI  >=14 - Abnormal Female indicating possible myocardial injury.  >=22 - Abnormal Male indicating possible myocardial injury.   Clinicians would have to utilize clinical acumen, EKG, Troponin, and serial changes to determine if it is an Acute Myocardial Infarction or myocardial injury due to an underlying chronic condition.         Magnesium [[id number]]  (Abnormal) Collected: 06/10/24 2228    Specimen: Blood from Arm, Left Updated: 06/10/24 2258     Magnesium 0.9 mg/dL     Comprehensive Metabolic Panel [[id number]]  (Abnormal) Collected: 06/10/24 2228    Specimen: Blood from Arm, Left Updated: 06/10/24 2256     Glucose 129 mg/dL      BUN 4 mg/dL      Creatinine 0.74 mg/dL      Sodium 136 mmol/L      Potassium 2.8 mmol/L      Chloride 102 mmol/L      CO2 20.1 mmol/L      Calcium 7.1 mg/dL      Total Protein 5.2 g/dL      Albumin 2.4 g/dL      ALT (SGPT) 9 U/L      AST (SGOT) 34 U/L      Alkaline Phosphatase 237 U/L      Total Bilirubin 0.3 mg/dL      Globulin 2.8 gm/dL      A/G Ratio 0.9 g/dL      BUN/Creatinine Ratio 5.4     Anion Gap 13.9 mmol/L      eGFR 94.5 mL/min/1.73     Narrative:      GFR Normal >60  Chronic Kidney Disease <60  Kidney Failure <15      CBC Auto Differential [[id number]]  (Abnormal) Collected: 06/10/24 2228    Specimen: Blood from Arm, Left Updated: 06/10/24 2239      WBC 14.30 10*3/mm3      RBC 2.84 10*6/mm3      Hemoglobin 9.2 g/dL      Hematocrit 28.6 %      .7 fL      MCH 32.4 pg      MCHC 32.2 g/dL      RDW 15.6 %      RDW-SD 58.0 fl      MPV 10.5 fL      Platelets 247 10*3/mm3      Neutrophil % 80.0 %      Lymphocyte % 10.6 %      Monocyte % 7.5 %      Eosinophil % 1.1 %      Basophil % 0.3 %      Immature Grans % 0.5 %      Neutrophils, Absolute 11.43 10*3/mm3      Lymphocytes, Absolute 1.52 10*3/mm3      Monocytes, Absolute 1.07 10*3/mm3      Eosinophils, Absolute 0.16 10*3/mm3      Basophils, Absolute 0.05 10*3/mm3      Immature Grans, Absolute 0.07 10*3/mm3      nRBC 0.0 /100 WBC           Imaging Results (Last 24 Hours)       Procedure Component Value Units Date/Time    XR Chest 1 View [[id number]] Collected: 06/11/24 0822     Updated: 06/11/24 0824    Narrative:      PROCEDURE: XR CHEST 1 VW-     HISTORY: Anasarca     COMPARISON: None.     FINDINGS: The heart is borderline enlarged. The mediastinum is  unremarkable. The lungs are clear. There is no pneumothorax.  There are  no acute osseous abnormalities.       Impression:      No acute cardiopulmonary process.     Continued followup is recommended.                 Images were reviewed, interpreted, and dictated by Dr. Amna Segovia MD  Transcribed by Nicole Valdez PA-C.     This report was signed and finalized on 6/11/2024 8:22 AM by Amna Segovia MD.       CT Abdomen Pelvis Without Contrast [[id number]] Collected: 06/11/24 0736     Updated: 06/11/24 0743    Narrative:      PROCEDURE: CT ABDOMEN PELVIS WO CONTRAST-     HISTORY: Anasarca and hypoalbuminemia.  Rule out liver cirrhosis.;  E87.6-Hypokalemia; E83.42-Hypomagnesemia; R60.1-Generalized edema     COMPARISON: None.     PROCEDURE: Axial images were obtained from the lung bases to the pubic  symphysis by computed tomography. This study was performed with  techniques to keep radiation doses as low as reasonably achievable,  (ALARA). Individualized dose  reduction techniques using automated  exposure control or adjustment of mA and/or kV according to the patient  size were employed.     FINDINGS:     ABDOMEN: The lung bases are clear except for a 4 mm pleural-based nodule  lateral right lower lobe, no recommendation for follow-up per  Fleischner's criteria. There are a few linear densities in both lower  lobes, likely atelectasis. There is evidence of old calcified  granulomatous disease. No pericardial or pleural effusions identified.  The heart is proper size. The limited non-contrast images of the liver  demonstrate no focal abnormality. Liver is enlarged at 20 cm. No  nodularity of the contour is identified. Portal vein is within normal  limits in size at 14 mm. Gallbladder present with no CT visible stones.  The spleen is normal in size with no focal mass. Vascular calcifications  noted. Right adrenal gland is normal. There is mild fullness of the left  adrenal gland. Splenule is identified adjacent to the splenic hilum. The  aorta is normal in caliber. There is no significant free fluid or  adenopathy.  There is no nephrolithiasis. There is no hydronephrosis.  PELVIS: The GI tract demonstrate no obstruction. There is moderate wall  thickening of the ascending colon and proximal two-thirds of the  transverse colon suggesting colitis. No diverticula identified. There is  mild infiltration of the surrounding fat at the hepatic flexure. Minimal  fluid is identified in both paracolic gutters and the pelvis. Uterus is  midline. There is a small exophytic nodule arising from the fundus,  likely an exophytic fibroid. The appendix is not identified. The urinary  bladder is almost completely collapsed. Small amount of high-attenuation  material identified in the cecum and distal descending colon, consider  minimal residual oral contrast. Diffuse subcutaneous edema identified.  There is no fluid or adenopathy.        Impression:      No hydronephrosis or  nephrolithiasis.     Hepatomegaly with no other signs of cirrhosis, recommend correlation  with liver function tests.     Evidence of colitis involving the ascending and proximal two-thirds of  the transverse colon, consider infectious or inflammatory process with  ischemic colitis not excluded.     Minimal ascites with significant diffuse subcutaneous edema.     Suspected small uterine fibroid.     CTDI: 6.01 mGy  DLP:278.7 mGy.cm     This report was signed and finalized on 6/11/2024 7:41 AM by Amna Segovia MD.             Physician Progress Notes (last 24 hours)  Notes from 06/10/24 0842 through 06/11/24 0842   No notes of this type exist for this encounter.       Consult Notes (last 24 hours)  Notes from 06/10/24 0842 through 06/11/24 0842   No notes of this type exist for this encounter.

## 2024-06-11 NOTE — CONSULTS
In Patient Consult      Date of Consultation: 2024  Patient Name: Nicole Silva  MRN: [id number]  : 1965     Referring provider: Pat Alatorre, *    Primary care provider:  Aleksandra Andres APRN    Reason for consultation: Abnormal GI imaging, diarrhea, profound hypoalbuminemia    History of Present Illness: 58-year-old female who was recently admitted to the hospital for nonhealing left foot laceration and cellulitis, came to the emergency room with anasarca, lower extremity edema, arm swelling.    She states that she had had an increase in her weight, some abdominal distention as well.    She does have significant lower extremity edema, and some generalized edema/anasarca.    She is profoundly hypoalbuminemic.  Even on admission in late May she had an albumin of 2.1.  She was also profoundly magnesium depleted, with a magnesium of 0.9.    She does have significant diarrhea, which is lasted for at least 2 months.  She did test positive for norovirus.    There was some hepatomegaly noted on CT scan along with anasarca.  No prior diagnosis of cirrhosis.  Echocardiogram done today which is unremarkable for any significant CHF.  Renal function is within normal limits.  She does have a history of heavy alcohol use, but not significant within the past few months.    Subjective     Past Medical History:   Diagnosis Date    COPD (chronic obstructive pulmonary disease)     Hypertension        Past Surgical History:   Procedure Laterality Date     SECTION  ,       Family History   Problem Relation Age of Onset    Ovarian cancer Mother     Breast cancer Neg Hx        Social History     Socioeconomic History    Marital status:    Tobacco Use    Smoking status: Former     Types: Cigarettes     Passive exposure: Past    Smokeless tobacco: Never   Vaping Use    Vaping status: Never Used   Substance and Sexual Activity    Alcohol use: No    Drug use: No    Sexual activity:  Yes     Partners: Male     Birth control/protection: Post-menopausal         Current Facility-Administered Medications:     acetaminophen (TYLENOL) tablet 650 mg, 650 mg, Oral, Q4H PRN **OR** acetaminophen (TYLENOL) 160 MG/5ML oral solution 650 mg, 650 mg, Oral, Q4H PRN **OR** acetaminophen (TYLENOL) suppository 650 mg, 650 mg, Rectal, Q4H PRN, Darek Berger MD    albumin human 25 % IV SOLN 25 g, 25 g, Intravenous, TID, Allen Santos MD    atorvastatin (LIPITOR) tablet 10 mg, 10 mg, Oral, Daily, Darek Berger MD, 10 mg at 06/11/24 0939    sennosides-docusate (PERICOLACE) 8.6-50 MG per tablet 2 tablet, 2 tablet, Oral, BID **AND** polyethylene glycol (MIRALAX) packet 17 g, 17 g, Oral, Daily PRN **AND** bisacodyl (DULCOLAX) EC tablet 5 mg, 5 mg, Oral, Daily PRN **AND** bisacodyl (DULCOLAX) suppository 10 mg, 10 mg, Rectal, Daily PRN, Darek Berger MD    budesonide (PULMICORT) nebulizer solution 0.5 mg, 0.5 mg, Nebulization, BID - RT, Darek Berger MD, 0.5 mg at 06/11/24 0654    cyclobenzaprine (FLEXERIL) tablet 5 mg, 5 mg, Oral, TID PRN, Darek Berger MD    Enoxaparin Sodium (LOVENOX) syringe 40 mg, 40 mg, Subcutaneous, Q24H, Darek Berger MD    fluticasone (FLONASE) 50 MCG/ACT nasal spray 2 spray, 2 spray, Nasal, Daily, Darek Berger MD, 2 spray at 06/11/24 0939    folic acid (FOLVITE) tablet 1 mg, 1 mg, Oral, Daily, Darek Berger MD, 1 mg at 06/11/24 0942    furosemide (LASIX) injection 20 mg, 20 mg, Intravenous, Daily, Darek Berger MD, 20 mg at 06/11/24 0942    ipratropium-albuterol (DUO-NEB) nebulizer solution 3 mL, 3 mL, Nebulization, Q4H PRN, Darek Berger MD    Magnesium Standard Dose Replacement - Follow Nurse / BPA Driven Protocol, , Does not apply, PRN, Darek Berger MD    montelukast (SINGULAIR) tablet 10 mg, 10 mg, Oral, Nightly, Darek Berger MD    ondansetron (ZOFRAN) injection 4 mg, 4 mg, Intravenous, Q6H PRN, Darek Berger MD    Pharmacy to Dose enoxaparin (LOVENOX), , Does not apply, Continuous PRN,  "Darek Berger MD    Potassium Replacement - Follow Nurse / BPA Driven Protocol, , Does not apply, PRN, Darek Berger MD    sodium chloride 0.9 % flush 10 mL, 10 mL, Intravenous, Q12H, Darek Berger MD, 10 mL at 06/11/24 0943    sodium chloride 0.9 % flush 10 mL, 10 mL, Intravenous, PRN, Darek Berger MD    sodium chloride 0.9 % infusion 40 mL, 40 mL, Intravenous, PRN, Darek Berger MD    spironolactone (ALDACTONE) tablet 50 mg, 50 mg, Oral, Daily, Darek Berger MD, 50 mg at 06/11/24 0939    thiamine (VITAMIN B-1) tablet 100 mg, 100 mg, Oral, Daily, Darek Berger MD, 100 mg at 06/11/24 0939    venlafaxine XR (EFFEXOR-XR) 24 hr capsule 150 mg, 150 mg, Oral, Daily With Breakfast, Darek Berger MD, 150 mg at 06/11/24 0810    No Known Allergies    Review of Systems  See HPI above.  Otherwise negative.    The following portions of the patient's history were reviewed and updated as appropriate: allergies, current medications, past family history, past medical history, past social history, past surgical history and problem list.    Objective     Vitals:    06/11/24 1144 06/11/24 1210 06/11/24 1520 06/11/24 1541   BP:  127/80 127/80    BP Location:  Left arm     Patient Position:  Lying     Pulse: 105 101     Resp:  18  18   Temp:  98.1 °F (36.7 °C)  98.3 °F (36.8 °C)   TempSrc:  Oral  Oral   SpO2: 94%      Weight:   65.8 kg (145 lb 1 oz)    Height:   162.6 cm (64.02\")        Physical Exam  Constitutional:       General: She is not in acute distress.     Appearance: Normal appearance.   HENT:      Head: Normocephalic and atraumatic.      Mouth/Throat:      Mouth: Mucous membranes are moist.   Eyes:      General: No scleral icterus.     Conjunctiva/sclera: Conjunctivae normal.   Cardiovascular:      Rate and Rhythm: Normal rate.   Pulmonary:      Effort: Pulmonary effort is normal. No respiratory distress.   Abdominal:      General: There is no distension.      Tenderness: There is no abdominal tenderness.   Musculoskeletal:  "        General: Swelling present. No deformity or signs of injury.      Right lower leg: Edema present.      Left lower leg: Edema present.   Skin:     Coloration: Skin is not jaundiced or pale.   Neurological:      General: No focal deficit present.      Mental Status: She is alert and oriented to person, place, and time.   Psychiatric:         Mood and Affect: Mood normal.         Behavior: Behavior normal.         Results from last 7 days   Lab Units 06/11/24  0646 06/10/24  2228   SODIUM mmol/L 137 136   POTASSIUM mmol/L 3.9 2.8*   CHLORIDE mmol/L 106 102   CO2 mmol/L 21.1* 20.1*   BUN mg/dL 4* 4*   CREATININE mg/dL 0.62 0.74   CALCIUM mg/dL 7.0* 7.1*   ALBUMIN g/dL 2.3* 2.4*   BILIRUBIN mg/dL 0.4 0.3   ALK PHOS U/L 243* 237*   ALT (SGPT) U/L 9 9   AST (SGOT) U/L 46* 34*   GLUCOSE mg/dL 112* 129*   WBC 10*3/mm3 12.97* 14.30*   HEMOGLOBIN g/dL 9.3* 9.2*   PLATELETS 10*3/mm3 245 247   INR  1.05  --        Imaging Results (Last 24 Hours)       Procedure Component Value Units Date/Time    XR Chest 1 View [[id number]] Collected: 06/11/24 0822     Updated: 06/11/24 0824    Narrative:      PROCEDURE: XR CHEST 1 VW-     HISTORY: Anasarca     COMPARISON: None.     FINDINGS: The heart is borderline enlarged. The mediastinum is  unremarkable. The lungs are clear. There is no pneumothorax.  There are  no acute osseous abnormalities.       Impression:      No acute cardiopulmonary process.     Continued followup is recommended.                 Images were reviewed, interpreted, and dictated by Dr. Amna Segovia MD  Transcribed by Nicole Valdez PA-C.     This report was signed and finalized on 6/11/2024 8:22 AM by Amna Segovia MD.       CT Abdomen Pelvis Without Contrast [[id number]] Collected: 06/11/24 0736     Updated: 06/11/24 0743    Narrative:      PROCEDURE: CT ABDOMEN PELVIS WO CONTRAST-     HISTORY: Anasarca and hypoalbuminemia.  Rule out liver cirrhosis.;  E87.6-Hypokalemia; E83.42-Hypomagnesemia; R60.1-Generalized  edema     COMPARISON: None.     PROCEDURE: Axial images were obtained from the lung bases to the pubic  symphysis by computed tomography. This study was performed with  techniques to keep radiation doses as low as reasonably achievable,  (ALARA). Individualized dose reduction techniques using automated  exposure control or adjustment of mA and/or kV according to the patient  size were employed.     FINDINGS:     ABDOMEN: The lung bases are clear except for a 4 mm pleural-based nodule  lateral right lower lobe, no recommendation for follow-up per  Fleischner's criteria. There are a few linear densities in both lower  lobes, likely atelectasis. There is evidence of old calcified  granulomatous disease. No pericardial or pleural effusions identified.  The heart is proper size. The limited non-contrast images of the liver  demonstrate no focal abnormality. Liver is enlarged at 20 cm. No  nodularity of the contour is identified. Portal vein is within normal  limits in size at 14 mm. Gallbladder present with no CT visible stones.  The spleen is normal in size with no focal mass. Vascular calcifications  noted. Right adrenal gland is normal. There is mild fullness of the left  adrenal gland. Splenule is identified adjacent to the splenic hilum. The  aorta is normal in caliber. There is no significant free fluid or  adenopathy.  There is no nephrolithiasis. There is no hydronephrosis.  PELVIS: The GI tract demonstrate no obstruction. There is moderate wall  thickening of the ascending colon and proximal two-thirds of the  transverse colon suggesting colitis. No diverticula identified. There is  mild infiltration of the surrounding fat at the hepatic flexure. Minimal  fluid is identified in both paracolic gutters and the pelvis. Uterus is  midline. There is a small exophytic nodule arising from the fundus,  likely an exophytic fibroid. The appendix is not identified. The urinary  bladder is almost completely collapsed. Small  amount of high-attenuation  material identified in the cecum and distal descending colon, consider  minimal residual oral contrast. Diffuse subcutaneous edema identified.  There is no fluid or adenopathy.        Impression:      No hydronephrosis or nephrolithiasis.     Hepatomegaly with no other signs of cirrhosis, recommend correlation  with liver function tests.     Evidence of colitis involving the ascending and proximal two-thirds of  the transverse colon, consider infectious or inflammatory process with  ischemic colitis not excluded.     Minimal ascites with significant diffuse subcutaneous edema.     Suspected small uterine fibroid.     CTDI: 6.01 mGy  DLP:278.7 mGy.cm     This report was signed and finalized on 6/11/2024 7:41 AM by Amna Segovia MD.               Assessment / Plan      Assessment/Recommendations:   Principal Problem:    Anasarca  Active Problems:    Hypokalemia    Hypomagnesemia    Hypoalbuminemia      While it is possible that early portal hypertension/cirrhosis could be causing this sort of picture.  She is not thrombocytopenic.  Her LFTs are unremarkable.  It is also possible however that she has just had significant protein loss from this profound diarrhea compounded with some degree of poor nutrition.    Repeat GI panel has been ordered.    Suggest that we replete the albumin.  Should likely help with her anasarca and edema.    Needs aggressive electrolyte replacement.  Also needs antidiarrheal treatment.    Will follow.  Etiology is not clear    Thank you very much for letting me participate in the care of this patient.  Please do not hesitate to call me if you have any questions.    Allen Santos MD  [facility]  6/11/2024  17:53 EDT    Part of this note may be an electronic transcription/translation of spoken language to printed text using the Dragon Dictation System.

## 2024-06-11 NOTE — H&P
[facility]   HISTORY AND PHYSICAL      Name:  Nicole Silva   Age:  58 y.o.  Sex:  female  :  1965  MRN:  [id number]   Visit Number:  [id number]  Admission Date:  6/10/2024  Date Of Service:  06/10/24  Primary Care Physician:  Aleksandra Andres APRN    Chief Complaint:     Generalized edema.    History Of Presenting Illness:      Nicole Silva is a 58-year-old female with history of hypertension, COPD was recently discharged from this facility on 2024 after treatment for nonhealing left foot laceration and cellulitis was brought to the emergency room with complaints of bilateral arm and leg swelling.  Since discharge from the hospital when she was also diagnosed with norovirus infection she has started gaining weight progressively.  Patient does have prior history of heavy alcohol use but states that she has not been used alcohol in the last several weeks.  She does live alone  and uses a walker to ambulate.  No prior history of documented liver disease.    In the emergency room, she was afebrile but tachycardic at 126 with initial blood pressure 130/68 and pulse oxygen saturation of 99% on room air.  Initial troponin 19 with a proBNP of 2249.  CMP was remarkable for potassium of 2.8, glucose 129, magnesium 0.9 and albumin of 2.4.  Alkaline phosphatase 237.  Creatinine was normal at 0.7.  CBC was remarkable for a white count of 14, hemoglobin of 9.2 (baseline) .7.  Chest x-ray was unremarkable.  Patient was given IV and oral potassium supplements as well as IV magnesium in the emergency room and was subsequently admitted to the medical floor with telemetry for management of anasarca, hypomagnesemia and hypokalemia.    Review Of Systems:    All systems were reviewed and negative except as mentioned in history of presenting illness, assessment and plan.    Past Medical History: Patient  has a past medical history of COPD (chronic obstructive pulmonary disease) and  Hypertension.    Past Surgical History: Patient  has a past surgical history that includes  section (,).    Social History: Patient  reports that she has quit smoking. Her smoking use included cigarettes. She has been exposed to tobacco smoke. She has never used smokeless tobacco. She reports that she does not drink alcohol and does not use drugs.    Family History:  Patient family history includes Ovarian cancer in her mother.     Allergies:      Patient has no known allergies.    Home Medications:    Prior to Admission Medications       Prescriptions Last Dose Informant Patient Reported? Taking?    albuterol 108 (90 Base) MCG/ACT inhaler   Yes No    Inhale.    atorvastatin (LIPITOR) 10 MG tablet   Yes No    Take 1 tablet by mouth Daily.    cetaphil (CETAPHIL) lotion   No No    Apply  topically to the appropriate area as directed Daily.    collagenase 250 UNIT/GM ointment   No No    Apply 1 Application topically to the appropriate area as directed Daily.    cyclobenzaprine (FLEXERIL) 5 MG tablet   Yes No    Take 1 tablet by mouth 3 (Three) Times a Day As Needed for Muscle Spasms.    fluticasone (FLONASE) 50 MCG/ACT nasal spray   Yes No    2 sprays into the nostril(s) as directed by provider Daily.    fluticasone (FLOVENT HFA) 110 MCG/ACT inhaler   No No    Inhale 1 puff twice daily.  Rinse mouth well after using.    folic acid (FOLVITE) 1 MG tablet   No No    Take 1 tablet by mouth Daily.    levocetirizine (XYZAL) 5 MG tablet   No No    Take 1 tablet by mouth Every Evening for 30 days.    loperamide (IMODIUM) 2 MG capsule   No No    Take 1 capsule by mouth 4 (Four) Times a Day As Needed for Diarrhea.    montelukast (SINGULAIR) 10 MG tablet   No No    Take 1 tablet by mouth Every Night.    Multivitamin tablet tablet   Yes No    multivitamin with minerals tablet tablet   No No    Take 1 tablet by mouth Daily.    progesterone (PROMETRIUM) 100 MG capsule   No No    TAKE ONE CAPSULE BY MOUTH DAILY     "Progesterone (PROMETRIUM) 100 MG capsule   Yes No    Take 1 capsule by mouth Every Night.    thiamine (VITAMIN B-1) 100 MG tablet   No No    Take 1 tablet by mouth Daily.    venlafaxine XR (EFFEXOR-XR) 75 MG 24 hr capsule   Yes No    Take 2 capsules by mouth.    vitamin D (ERGOCALCIFEROL) 1.25 MG (62171 UT) capsule capsule   Yes No     ED Medications:    Medications   potassium chloride 10 mEq in 100 mL IVPB (10 mEq Intravenous New Bag 6/10/24 2340)   potassium chloride (MICRO-K/KLOR-CON) CR capsule (40 mEq Oral Given 6/10/24 2314)   magnesium sulfate 2g/50 mL (PREMIX) infusion (2 g Intravenous New Bag 6/10/24 2334)     Vital Signs:  Temp:  [97.8 °F (36.6 °C)] 97.8 °F (36.6 °C)  Heart Rate:  [114-126] 114  Resp:  [18] 18  BP: (107-131)/(64-68) 108/67        06/10/24  2124   Weight: 65.8 kg (145 lb)     Body mass index is 24.89 kg/m².    Physical Exam:     Most recent vital Signs: /67 (BP Location: Left arm, Patient Position: Sitting)   Pulse 114   Temp 97.8 °F (36.6 °C) (Oral)   Resp 18   Ht 162.6 cm (64\")   Wt 65.8 kg (145 lb)   SpO2 96%   BMI 24.89 kg/m²     Physical Exam  Constitutional:       General: She is not in acute distress.     Appearance: She is obese. She is not ill-appearing.   HENT:      Head: Normocephalic and atraumatic.      Right Ear: External ear normal.      Left Ear: External ear normal.      Nose: Nose normal.      Mouth/Throat:      Mouth: Mucous membranes are moist.   Eyes:      Extraocular Movements: Extraocular movements intact.      Conjunctiva/sclera: Conjunctivae normal.   Cardiovascular:      Rate and Rhythm: Normal rate and regular rhythm.      Pulses: Normal pulses.      Heart sounds: Normal heart sounds. No murmur heard.  Pulmonary:      Effort: Pulmonary effort is normal.      Breath sounds: Normal breath sounds. No wheezing or rales.   Abdominal:      Palpations: Abdomen is soft.      Tenderness: There is no abdominal tenderness. There is no guarding or rebound.      " Comments: Obese abdomen with dull flanks.   Musculoskeletal:         General: Normal range of motion.      Cervical back: Neck supple.      Right lower leg: Edema present.      Left lower leg: Edema present.      Comments: 3+ pitting edema noted bilateral lower extremities up to the thighs.  Healing left dorsal foot laceration noted without any erythema.  Right knee joint surgical scar noted.   Skin:     General: Skin is warm.      Findings: No erythema or rash.   Neurological:      General: No focal deficit present.      Mental Status: She is alert and oriented to person, place, and time. Mental status is at baseline.   Psychiatric:         Mood and Affect: Mood normal.         Behavior: Behavior normal.       Laboratory data:    I have reviewed the labs done in the emergency room.    Results from last 7 days   Lab Units 06/10/24  2228   SODIUM mmol/L 136   POTASSIUM mmol/L 2.8*   CHLORIDE mmol/L 102   CO2 mmol/L 20.1*   BUN mg/dL 4*   CREATININE mg/dL 0.74   CALCIUM mg/dL 7.1*   BILIRUBIN mg/dL 0.3   ALK PHOS U/L 237*   ALT (SGPT) U/L 9   AST (SGOT) U/L 34*   GLUCOSE mg/dL 129*     Results from last 7 days   Lab Units 06/10/24  2228   WBC 10*3/mm3 14.30*   HEMOGLOBIN g/dL 9.2*   HEMATOCRIT % 28.6*   PLATELETS 10*3/mm3 247         Results from last 7 days   Lab Units 06/10/24  2228   HSTROP T ng/L 19*     Results from last 7 days   Lab Units 06/10/24  2228   PROBNP pg/mL 2,249.0*     EKG:      EKG done in the emergency room was reviewed by me.  It shows sinus tachycardia at 115 bpm.  Normal axis.  No significant ST-T changes were noted.    Radiology:    Portable chest x-ray done in the emergency room was reviewed by me.  It shows chronic appearing parenchymal changes without any infiltrates.  Sequela of old granulomatous disease noted on the left mid zone.  Official report is currently pending.    Assessment:    Anasarca with hypoalbuminemia, suspected liver disease.  Suspected alcoholic versus fatty liver  disease.  Hypokalemia and hypomagnesemia, POA.  COPD, no exacerbation.  Essential hypertension.  History of alcohol abuse.  Recent history of left foot laceration, healing.    Plan:    Anasarca/hypoalbuminemia.  - I suspect her anasarca is likely secondary to decreased liver function.  She has history of chronic alcohol use and may also have underlying fatty liver disease and cirrhosis.  - We will obtain CT scan of the abdomen and pelvis without contrast to evaluate liver morphology.  - I will start her on IV Lasix and oral spironolactone.  - If she does have cirrhosis noted on CT scan, she will need further workup for etiology.  - We will also obtain 2D echocardiogram to assess the left ventricular function.    Hypomagnesemia/hypokalemia.  - We will place her on electrolyte replacement protocol.    We will consult physical and occupational therapy.    Risk Assessment: Moderate  DVT Prophylaxis: Enoxaparin  Code Status: Full  Diet: Cardiac      Darek Berger MD  06/10/24  23:51 EDT    Dictated utilizing Dragon dictation.

## 2024-06-11 NOTE — PLAN OF CARE
Goal Outcome Evaluation:  Plan of Care Reviewed With: patient        Progress: no change  Outcome Evaluation: OT eval completed. Patient is supine in bed, denies pain, is Ox4. Patient lives with her son, who works during the day, and has a daughter who lives nearby. Patient is ind with HH mobility using a cane, ind with ADLs; has a walk in shower with bench. Patient reports she is ind with IADLs, her daughter drives her to appts and the grocery store. Patient states she has required a scooter in the store d/t bilateral knee pain and swelling. Patient performed bed mobility with modified ind, sit to stand SBA and tf to BSC with CGA. Patient able to perform bathing with CGA-Min A, LBD and toileting with CGA. Patient declined functional mobility this date d/t diarrhea and bilateral knee pain. Patient is expected to benefit from continued OT services prior to DC and plans to return home with family assisting. Would benefit from HH services, a BSC to be used as a riser over commode and a rollator.      Anticipated Discharge Disposition (OT): home with assist, home with home health

## 2024-06-11 NOTE — CASE MANAGEMENT/SOCIAL WORK
Discharge Planning Assessment  [facility]     Patient Name: Nicole Silva  MRN: [id number]  Today's Date: 6/11/2024    Admit Date: 6/10/2024    Plan: The patient is awake and able to answer questions.  She is upset with her [facility] PCP, Myrtle Andres, and would like to set up as new patient with Dr. Florez after DC from this hospitalization.  She gets her medications from [facility].  She elects to enroll in Meds to Bed.  She does not use DME at this time but does request a bedside commode to take home at DC.  She does not have a DME company preference  At the time of DC the patient plans to return to the home she shares with her adult son.  Questions and concerns were addressed at the time of this conversation.  Will provide additional resources and information upon patient request.   Discharge Needs Assessment       Row Name 06/11/24 1119       Living Environment    People in Home child(isabel), adult    Name(s) of People in Home Herman, Adult Son    Current Living Arrangements home    Duration at Residence 30 years    Potentially Unsafe Housing Conditions none    In the past 12 months has the electric, gas, oil, or water company threatened to shut off services in your home? No    Primary Care Provided by self    Provides Primary Care For no one    Family Caregiver if Needed none    Quality of Family Relationships helpful;involved;supportive    Able to Return to Prior Arrangements yes       Resource/Environmental Concerns    Resource/Environmental Concerns none    Transportation Concerns none       Transportation Needs    In the past 12 months, has lack of transportation kept you from medical appointments or from getting medications? no    In the past 12 months, has lack of transportation kept you from meetings, work, or from getting things needed for daily living? No       Food Insecurity    Within the past 12 months, you worried that your food would run out before you got the money to buy more. Never true     Within the past 12 months, the food you bought just didn't last and you didn't have money to get more. Never true       Transition Planning    Patient/Family Anticipates Transition to home with family    Patient/Family Anticipated Services at Transition none    Transportation Anticipated family or friend will provide       Discharge Needs Assessment    Readmission Within the Last 30 Days no previous admission in last 30 days    Equipment Currently Used at Home none    Concerns to be Addressed discharge planning    Anticipated Changes Related to Illness none    Equipment Needed After Discharge commode    Provided Post Acute Provider List? N/A    N/A Provider List Comment Patient plans to return home; requests [facility], no DME company preference    Provided Post Acute Provider Quality & Resource List? N/A    N/A Quality & Resource List Comment Patient plans to return home; requests [facility], no DME company preference    Patient's Choice of Community Agency(s) no DME company preference                   Discharge Plan       Row Name 06/11/24 1122       Plan    Plan The patient is awake and able to answer questions.  She is upset with her [facility] PCP, Myrtle Andres, and would like to set up as new patient with Dr. Florez after DC from this hospitalization.  She gets her medications from [employer].  She elects to enroll in Meds to Bed.  She does not use DME at this time but does request a bedside commode to take home at DC.  She does not have a DME company preference  At the time of DC the patient plans to return to the home she shares with her adult son.  Questions and concerns were addressed at the time of this conversation.  Will provide additional resources and information upon patient request.    Patient/Family in Agreement with Plan yes    Provided Post Acute Provider List? N/A    N/A Provider List Comment Patient plans to return home; requests [facility], no DME company preference    Provided Post Acute Provider Quality &  Resource List? N/A    N/A Quality & Resource List Comment Patient plans to return home; requests [facility], no DME company preference    Plan Comments requests [facility], no DME company preference    Final Discharge Disposition Code 01 - home or self-care    Final Note Plans to DC home with son                  Continued Care and Services - Admitted Since 6/10/2024    No active coordination exists for this encounter.          Demographic Summary       Row Name 06/11/24 1118       General Information    Admission Type inpatient    Arrived From emergency department    Referral Source admission list    Reason for Consult discharge planning    Preferred Language English       Contact Information    Permission Granted to Share Info With                    Functional Status       Row Name 06/11/24 1118       Functional Status    Usual Activity Tolerance excellent    Current Activity Tolerance moderate       Physical Activity    On average, how many days per week do you engage in moderate to strenuous exercise (like a brisk walk)? 0 days    On average, how many minutes do you engage in exercise at this level? 0 min    Number of minutes of exercise per week 0       Assessment of Health Literacy    How often do you have someone help you read hospital materials? Never    How often do you have problems learning about your medical condition because of difficulty understanding written information? Never    How often do you have a problem understanding what is told to you about your medical condition? Never    How confident are you filling out medical forms by yourself? Extremely    Health Literacy Excellent       Functional Status, IADL    Medications independent    Meal Preparation independent    Housekeeping independent    Laundry independent    Shopping independent       Mental Status    General Appearance WDL WDL       Mental Status Summary    Recent Changes in Mental Status/Cognitive Functioning no changes                    Psychosocial       Row Name 06/11/24 1119       Values/Beliefs    Spiritual, Cultural Beliefs, Tenriism Practices, Values that Affect Care no       Behavior WDL    Behavior WDL WDL       Emotion Mood WDL    Emotion/Mood/Affect WDL WDL       Speech WDL    Speech WDL WDL       Perceptual State WDL    Perceptual State WDL WDL       Thought Process WDL    Thought Process WDL WDL       Intellectual Performance WDL    Intellectual Performance WDL WDL                   Abuse/Neglect       Row Name 06/11/24 1119       Personal Safety    Feels Unsafe at Home or Work/School no    Feels Threatened by Someone no    Does Anyone Try to Keep You From Having Contact with Others or Doing Things Outside Your Home? no    Physical Signs of Abuse Present no                   Legal       Row Name 06/11/24 1119       Financial Resource Strain    How hard is it for you to pay for the very basics like food, housing, medical care, and heating? Not hard                   Substance Abuse       Row Name 06/11/24 1119       Substance Use    Substance Use Status current tobacco use    Last Tobacco Use Date 06/10/24  Patient declines resources for smoking cessation                   Patient Forms    No documentation.                     Mercy Angeles, RN

## 2024-06-11 NOTE — PROGRESS NOTES
[facility]    Name:  Nicole Silva   Age:  58 y.o.  Sex:  female  :  1965  MRN:  [id number]   Visit Number:  [id number]  Admission Date:  6/10/2024  Date Of Service:  24  Primary Care Physician:  Aleksandra Andres APRN     LOS: 0 days :    Chief Complaint:      Swelling, diarrhea    Subjective:    Patient seen in emergency room while she was boarding.  She had still had some diarrhea at home and abdominal cramping.  She has noted increasing edema since being discharged from the hospital.  She denies any recent alcohol use.    Hospital Course:    58-year-old with a history of hypertension, COPD, recent diagnosis of a left foot laceration cellulitis, she is also diagnosed with norovirus recently.  She had actually been treated at this facility for the norovirus and dehydration.  She also had a prior history of alcohol use.    Workup in the emergency room have demonstrated overall stable vital signs.  She did have elevated proBNP, creatinine was 0.7.  White count was 14 hemoglobin is 9.2.  CT imaging was concerning for fluid overload, there was also notable hypomagnesemia and hypokalemia.  She was admitted to the hospital service    Review of Systems:     All systems were reviewed and negative except as mentioned in subjective, assessment and plan.    Vital Signs:    Temp:  [97.8 °F (36.6 °C)-98.1 °F (36.7 °C)] 98.1 °F (36.7 °C)  Heart Rate:  [101-126] 101  Resp:  [18] 18  BP: (101-131)/(61-81) 127/80    Intake and output:    I/O last 3 completed shifts:  In: 600 [IV Piggyback:600]  Out: -   No intake/output data recorded.    Physical Examination:    General Appearance:  Alert and cooperative.  NAD   Head:  Atraumatic and normocephalic.   Eyes: Conjunctivae and sclerae normal, no icterus. No pallor.   Throat: No oral lesions, no thrush, oral mucosa moist.   Neck: Supple, trachea midline, no thyromegaly.   Lungs:   Breath sounds heard bilaterally equally.   "No wheezing or crackles. No Pleural rub or bronchial breathing.   Heart:  Normal S1 and S2, no murmur, no gallop, no rub. No JVD.   Abdomen:   Normal bowel sounds, no masses, no organomegaly. Soft, nontender, nondistended, no rebound tenderness.   Extremities: Supple, 2-3+ edema, no cyanosis, no clubbing.   Skin: No bleeding or rash.   Neurologic: Alert and oriented x 3. No facial asymmetry. Moves all four limbs. No tremors.      Laboratory results:    Results from last 7 days   Lab Units 06/11/24  0646 06/10/24  2228   SODIUM mmol/L 137 136   POTASSIUM mmol/L 3.9 2.8*   CHLORIDE mmol/L 106 102   CO2 mmol/L 21.1* 20.1*   BUN mg/dL 4* 4*   CREATININE mg/dL 0.62 0.74   CALCIUM mg/dL 7.0* 7.1*   BILIRUBIN mg/dL 0.4 0.3   ALK PHOS U/L 243* 237*   ALT (SGPT) U/L 9 9   AST (SGOT) U/L 46* 34*   GLUCOSE mg/dL 112* 129*     Results from last 7 days   Lab Units 06/11/24  0646 06/10/24  2228   WBC 10*3/mm3 12.97* 14.30*   HEMOGLOBIN g/dL 9.3* 9.2*   HEMATOCRIT % 28.6* 28.6*   PLATELETS 10*3/mm3 245 247     Results from last 7 days   Lab Units 06/11/24  0646   INR  1.05     Results from last 7 days   Lab Units 06/11/24  0024 06/10/24  2228   HSTROP T ng/L 19* 19*         No results for input(s): \"PHART\", \"XZV5EHV\", \"PO2ART\", \"VPN1RBR\", \"BASEEXCESS\" in the last 8760 hours.   I have reviewed the patient's laboratory results.    Radiology results:    XR Chest 1 View    Result Date: 6/11/2024  PROCEDURE: XR CHEST 1 VW-  HISTORY: Anasarca  COMPARISON: None.  FINDINGS: The heart is borderline enlarged. The mediastinum is unremarkable. The lungs are clear. There is no pneumothorax.  There are no acute osseous abnormalities.      Impression: No acute cardiopulmonary process.  Continued followup is recommended.      Images were reviewed, interpreted, and dictated by Dr. Amna Segovia MD Transcribed by Nicole Valdez PA-C.  This report was signed and finalized on 6/11/2024 8:22 AM by Amna Segovia MD.      CT Abdomen Pelvis Without " Contrast    Result Date: 6/11/2024  PROCEDURE: CT ABDOMEN PELVIS WO CONTRAST-  HISTORY: Anasarca and hypoalbuminemia.  Rule out liver cirrhosis.; E87.6-Hypokalemia; E83.42-Hypomagnesemia; R60.1-Generalized edema  COMPARISON: None.  PROCEDURE: Axial images were obtained from the lung bases to the pubic symphysis by computed tomography. This study was performed with techniques to keep radiation doses as low as reasonably achievable, (ALARA). Individualized dose reduction techniques using automated exposure control or adjustment of mA and/or kV according to the patient size were employed.  FINDINGS:  ABDOMEN: The lung bases are clear except for a 4 mm pleural-based nodule lateral right lower lobe, no recommendation for follow-up per Fleischner's criteria. There are a few linear densities in both lower lobes, likely atelectasis. There is evidence of old calcified granulomatous disease. No pericardial or pleural effusions identified. The heart is proper size. The limited non-contrast images of the liver demonstrate no focal abnormality. Liver is enlarged at 20 cm. No nodularity of the contour is identified. Portal vein is within normal limits in size at 14 mm. Gallbladder present with no CT visible stones. The spleen is normal in size with no focal mass. Vascular calcifications noted. Right adrenal gland is normal. There is mild fullness of the left adrenal gland. Splenule is identified adjacent to the splenic hilum. The aorta is normal in caliber. There is no significant free fluid or adenopathy.  There is no nephrolithiasis. There is no hydronephrosis. PELVIS: The GI tract demonstrate no obstruction. There is moderate wall thickening of the ascending colon and proximal two-thirds of the transverse colon suggesting colitis. No diverticula identified. There is mild infiltration of the surrounding fat at the hepatic flexure. Minimal fluid is identified in both paracolic gutters and the pelvis. Uterus is midline. There is  a small exophytic nodule arising from the fundus, likely an exophytic fibroid. The appendix is not identified. The urinary bladder is almost completely collapsed. Small amount of high-attenuation material identified in the cecum and distal descending colon, consider minimal residual oral contrast. Diffuse subcutaneous edema identified. There is no fluid or adenopathy.      Impression: No hydronephrosis or nephrolithiasis.  Hepatomegaly with no other signs of cirrhosis, recommend correlation with liver function tests.  Evidence of colitis involving the ascending and proximal two-thirds of the transverse colon, consider infectious or inflammatory process with ischemic colitis not excluded.  Minimal ascites with significant diffuse subcutaneous edema.  Suspected small uterine fibroid.  CTDI: 6.01 mGy DLP:278.7 mGy.cm  This report was signed and finalized on 6/11/2024 7:41 AM by Amna Segovia MD.     I have reviewed the patient's radiology reports.    Medication Review:     I have reviewed the patient's active and prn medications.     Problem List:      Anasarca    Hypokalemia    Hypomagnesemia      Assessment:    Anasarca with hypoalbuminemia, suspected liver disease.  Suspected alcoholic versus fatty liver disease.  Hypokalemia and hypomagnesemia, POA.  COPD, no exacerbation.  Essential hypertension.  History of alcohol abuse.  Recent history of left foot laceration, healing.    Plan:    Anasarca/hypoalbuminemia.  CT imaging did show some minimal amount of ascites, there was third spacing noted.  There is hepatomegaly.  I am going to consult with GI.  She also has ongoing colitis, she did have recent norovirus 2-1/2 weeks ago still with intermittent diarrhea.  Will send GI panel and C. difficile testing.     Hypomagnesemia/hypokalemia.  Replacement as tolerated.  She is on diuretic therapy currently.  Will need monitored    Anticipate multiple days    DVT Prophylaxis: Enoxaparin  Code Status: Full  Diet:  Cardiac  Discharge Plan: CARO Alatorre DO  06/11/24  14:42 EDT    Dictated utilizing Dragon dictation.

## 2024-06-11 NOTE — THERAPY EVALUATION
Patient Name: Nicole Silva  : 1965    MRN: [id number]                              Today's Date: 2024       Admit Date: 6/10/2024    Visit Dx:     ICD-10-CM ICD-9-CM   1. Hypokalemia  E87.6 276.8   2. Hypomagnesemia  E83.42 275.2   3. Anasarca  R60.1 782.3     Patient Active Problem List   Diagnosis    Cellulitis of left foot    Lactic acidosis    Hypokalemia    Hypomagnesemia    Anasarca     Past Medical History:   Diagnosis Date    COPD (chronic obstructive pulmonary disease)     Hypertension      Past Surgical History:   Procedure Laterality Date     SECTION  ,      General Information       Row Name 24 1634          OT Time and Intention    Document Type evaluation  -SD     Mode of Treatment occupational therapy  -SD       Row Name 24 1634          General Information    Patient Profile Reviewed yes  -SD     Prior Level of Function independent:;all household mobility;community mobility;ADL's  Lives with her son who works and has a daughter who lives nearby. Has a SPC and walk in shower with bench. Patient ind with IADLs, daughter drives her to appts.  -SD     Existing Precautions/Restrictions fall  -SD     Barriers to Rehab medically complex  -SD       Row Name 24 1634          Living Environment    People in Home child(isabel), adult  -SD       Row Name 24 1634          Cognition    Orientation Status (Cognition) oriented x 4  -SD       Row Name 24 1634          Safety Issues, Functional Mobility    Safety Issues Affecting Function (Mobility) safety precautions follow-through/compliance;safety precaution awareness  -SD     Impairments Affecting Function (Mobility) balance;endurance/activity tolerance;strength;pain  -SD               User Key  (r) = Recorded By, (t) = Taken By, (c) = Cosigned By      Initials Name Provider Type    SD Rubia Mims OT Occupational Therapist                     Mobility/ADL's       Row Name 24 1635          Bed  Mobility    Bed Mobility supine-sit;sit-supine  -SD     Supine-Sit [place] (Bed Mobility) modified independence  -SD     Sit-Supine [place] (Bed Mobility) modified independence  -SD     Assistive Device (Bed Mobility) head of bed elevated;bed rails  -SD       [facility] Name 06/11/24 1635          Transfers    Transfers sit-stand transfer;toilet transfer  -SD       Row Name 06/11/24 1635          Sit-Stand Transfer    Sit-Stand [place] (Transfers) standby assist  -SD       [facility] Name 06/11/24 1635          Toilet Transfer    Type (Toilet Transfer) sit-stand;stand-sit  -SD     [place] Level (Toilet Transfer) contact guard  -SD     Assistive Device (Toilet Transfer) commode, bedside without drop arms  -SD       [facility] Name 06/11/24 1635          Functional Mobility    Functional Mobility- Ind. Level not tested  -SD       Row Name 06/11/24 1635          Activities of Daily Living    BADL Assessment/Intervention bathing;upper body dressing;lower body dressing;grooming;feeding;toileting  -[facility] Name 06/11/24 1635          Bathing Assessment/Intervention    [place] Level (Bathing) contact guard assist;minimum assist (75% patient effort)  -[facility] Name 06/11/24 1635          Upper Body Dressing Assessment/Training    [place] Level (Upper Body Dressing) set up  -[facility] Name 06/11/24 1635          Lower Body Dressing Assessment/Training    [place] Level (Lower Body Dressing) contact guard assist  -SD       [facility] Name 06/11/24 1635          Grooming Assessment/Training    [place] Level (Grooming) standby assist  -SD       [facility] Name 06/11/24 1635          Self-Feeding Assessment/Training    [place] Level (Feeding) set up  -SD       Row Name 06/11/24 1635          Toileting Assessment/Training    [place] Level (Toileting) contact guard assist  -SD     Assistive Devices (Toileting) commode, bedside without drop arms  -SD               User Key  (r) = Recorded By, (t) = Taken  By, (c) = Cosigned By      Initials Name Provider Type    SD Rubia Mims OT Occupational Therapist                   Obj/Interventions       [facility] Name 06/11/24 1636          Range of Motion Comprehensive    General Range of Motion bilateral upper extremity ROM WFL  -SD       [facility] Name 06/11/24 1636          Strength Comprehensive (MMT)    General Manual Muscle Testing (MMT) Assessment upper extremity strength deficits identified  -SD     Comment, General Manual Muscle Testing (MMT) Assessment UB 4-/5  -SD               User Key  (r) = Recorded By, (t) = Taken By, (c) = Cosigned By      Initials Name Provider Type    SD Rubia Mims OT Occupational Therapist                   Goals/Plan       Row Name 06/11/24 1646          Transfer Goal 1 (OT)    Activity/Assistive Device (Transfer Goal 1, OT) sit-to-stand/stand-to-sit;walker, rolling  -SD     [place] Level/Cues Needed (Transfer Goal 1, OT) modified independence  -SD     Time Frame (Transfer Goal 1, OT) long term goal (LTG)  -SD     Progress/Outcome (Transfer Goal 1, OT) goal ongoing  -SD       Row Name 06/11/24 1646          Bathing Goal 1 (OT)    Activity/Device (Bathing Goal 1, OT) bathing skills, all  -SD     [place] Level/Cues Needed (Bathing Goal 1, OT) standby assist  -SD     Time Frame (Bathing Goal 1, OT) 2 weeks  -SD     Progress/Outcomes (Bathing Goal 1, OT) goal ongoing  -SD       Row Name 06/11/24 1646          Toileting Goal 1 (OT)    Activity/Device (Toileting Goal 1, OT) toileting skills, all;raised toilet seat  -SD     [place] Level/Cues Needed (Toileting Goal 1, OT) standby assist  -SD     Time Frame (Toileting Goal 1, OT) 2 weeks  -SD     Progress/Outcome (Toileting Goal 1, OT) goal ongoing  -SD       Row Name 06/11/24 1646          Strength Goal 1 (OT)    Strength Goal 1 (OT) Patient to perform UB ther ex as tolerated  -SD     Time Frame (Strength Goal 1, OT) long term goal (LTG)  -SD     Progress/Outcome (Strength Goal 1,  OT) goal ongoing  -SD       Row Name 06/11/24 1646          Therapy Assessment/Plan (OT)    Planned Therapy Interventions (OT) activity tolerance training;adaptive equipment training;BADL retraining;patient/caregiver education/training;ROM/therapeutic exercise;strengthening exercise;transfer/mobility retraining  -SD               User Key  (r) = Recorded By, (t) = Taken By, (c) = Cosigned By      Initials Name Provider Type    Rubia Munroe OT Occupational Therapist                   Clinical Impression       Row Name 06/11/24 1636          Pain Assessment    Pretreatment Pain Rating 0/10 - no pain  -SD     Posttreatment Pain Rating 0/10 - no pain  -SD       Row Name 06/11/24 1636          Plan of Care Review    Plan of Care Reviewed With patient  -SD     Progress no change  -SD     Outcome Evaluation OT eval completed. Patient is supine in bed, denies pain, is Ox4. Patient lives with her son, who works during the day, and has a daughter who lives nearby. Patient is ind with HH mobility using a cane, ind with ADLs; has a walk in shower with bench. Patient reports she is ind with IADLs, her daughter drives her to [employer] and the grocery store. Patient states she has required a scooter in the store d/t bilateral knee pain and swelling. Patient performed bed mobility with modified ind, sit to stand SBA and tf to BSC with CGA. Patient able to perform bathing with CGA-Min A, LBD and toileting with CGA. Patient declined functional mobility this date d/t diarrhea and bilateral knee pain. Patient is expected to benefit from continued OT services prior to DC and plans to return home with family assisting. Would benefit from HH services, a BSC to be used as a riser over commode and a rollator.  -SD       Row Name 06/11/24 1636          Therapy Assessment/Plan (OT)    Patient/Family Therapy Goal Statement (OT) home  -SD     Rehab Potential (OT) good, to achieve stated therapy goals  -SD     Criteria for Skilled  Therapeutic Interventions Met (OT) skilled treatment is necessary  -SD     Therapy Frequency (OT) 3 times/wk  5 times if indicated  -SD       Row Name 06/11/24 1636          Therapy Plan Review/Discharge Plan (OT)    Equipment Needs Upon Discharge (OT) other (see comments);commode chair  rollator  -SD     Anticipated Discharge Disposition (OT) home with assist;home with home health  -SD       Row Name 06/11/24 1636          Vital Signs    O2 Delivery Pre Treatment room air  -SD     O2 Delivery Intra Treatment room air  -SD     O2 Delivery Post Treatment room air  -SD       Row Name 06/11/24 1636          Positioning and Restraints    Pre-Treatment Position in bed  -SD     Post Treatment Position bed  -SD     In Bed sitting EOB;call light within reach;encouraged to call for assist;with nsg  -SD               User Key  (r) = Recorded By, (t) = Taken By, (c) = Cosigned By      Initials Name Provider Type    Rubia Munroe OT Occupational Therapist                   Outcome Measures       Row Name 06/11/24 1646          How much help from another is currently needed...    Putting on and taking off regular lower body clothing? 3  -SD     Bathing (including washing, rinsing, and drying) 3  -SD     Toileting (which includes using toilet bed pan or urinal) 3  -SD     Putting on and taking off regular upper body clothing 4  -SD     Taking care of personal grooming (such as brushing teeth) 4  -SD     Eating meals 4  -SD     AM-PAC 6 Clicks Score (OT) 21  -SD       Row Name 06/11/24 1300 06/11/24 1215       How much help from another person do you currently need...    Turning from your back to your side while in flat bed without using bedrails? 4  -SP 4  -SP    Moving from lying on back to sitting on the side of a flat bed without bedrails? 4  -SP 4  -SP    Moving to and from a bed to a chair (including a wheelchair)? 4  -SP 4  -SP    Standing up from a chair using your arms (e.g., wheelchair, bedside chair)? 4  -SP 4   -SP    Climbing 3-5 steps with a railing? 4  -SP 4  -SP    To walk in hospital room? 4  -SP 4  -SP    AM-PAC 6 Clicks Score (PT) 24  -SP 24  -SP    Highest Level of Mobility Goal 8 --> Walked 250 feet or more  -SP 8 --> Walked 250 feet or more  -SP      Row Name 06/11/24 1646          Functional Assessment    Outcome Measure Options AM-PAC 6 Clicks Daily Activity (OT)  -SD               User Key  (r) = Recorded By, (t) = Taken By, (c) = Cosigned By      Initials Name Provider Type    SD Rubia Mims, CHRISTINA Occupational Therapist    Scarlet Dumont, RN Registered Nurse                    Occupational Therapy Education       Title: PT OT SLP Therapies (In Progress)       Topic: Occupational Therapy (In Progress)       Point: ADL training (Done)       Description:   Instruct learner(s) on proper safety adaptation and remediation techniques during self care or transfers.   Instruct in proper use of assistive devices.                  Learning Progress Summary             Patient Acceptance, E,TB, VU by SD at 6/11/2024 9187    Comment: OT POC                         Point: Home exercise program (Not Started)       Description:   Instruct learner(s) on appropriate technique for monitoring, assisting and/or progressing therapeutic exercises/activities.                  Learner Progress:  Not documented in this visit.              Point: Precautions (Not Started)       Description:   Instruct learner(s) on prescribed precautions during self-care and functional transfers.                  Learner Progress:  Not documented in this visit.              Point: Body mechanics (Not Started)       Description:   Instruct learner(s) on proper positioning and spine alignment during self-care, functional mobility activities and/or exercises.                  Learner Progress:  Not documented in this visit.                              User Key       Initials Effective Dates Name Provider Type Discipline    SD 06/16/21 -   Rubia Mims, OT Occupational Therapist OT                  OT Recommendation and Plan  Planned Therapy Interventions (OT): activity tolerance training, adaptive equipment training, BADL retraining, patient/caregiver education/training, ROM/therapeutic exercise, strengthening exercise, transfer/mobility retraining  Therapy Frequency (OT): 3 times/wk (5 times if indicated)  Plan of Care Review  Plan of Care Reviewed With: patient  Progress: no change  Outcome Evaluation: OT eval completed. Patient is supine in bed, denies pain, is Ox4. Patient lives with her son, who works during the day, and has a daughter who lives nearby. Patient is ind with HH mobility using a cane, ind with ADLs; has a walk in shower with bench. Patient reports she is ind with IADLs, her daughter drives her to appts and the grocery store. Patient states she has required a scooter in the store d/t bilateral knee pain and swelling. Patient performed bed mobility with modified ind, sit to stand SBA and tf to BSC with CGA. Patient able to perform bathing with CGA-Min A, LBD and toileting with CGA. Patient declined functional mobility this date d/t diarrhea and bilateral knee pain. Patient is expected to benefit from continued OT services prior to DC and plans to return home with family assisting. Would benefit from HH services, a BSC to be used as a riser over commode and a rollator.     Time Calculation:   Evaluation Complexity (OT)  Review Occupational Profile/Medical/Therapy History Complexity: brief/low complexity  Assessment, Occupational Performance/Identification of Deficit Complexity: 1-3 performance deficits  Clinical Decision Making Complexity (OT): problem focused assessment/low complexity  Overall Complexity of Evaluation (OT): low complexity     Time Calculation- OT       Row Name 06/11/24 1648             Time Calculation- OT    OT Start Time 1524  -SD      OT Received On 06/11/24  -SD      OT Goal Re-Cert Due Date 06/21/24  -SD          Untimed Charges    OT Eval/Re-eval Minutes 45  -SD         Total Minutes    Untimed Charges Total Minutes 45  -SD       Total Minutes 45  -SD                User Key  (r) = Recorded By, (t) = Taken By, (c) = Cosigned By      Initials Name Provider Type    Rubia Munroe OT Occupational Therapist                  Therapy Charges for Today       Code Description Service Date Service Provider Modifiers Qty    [id number]  OT EVAL LOW COMPLEXITY 3 6/11/2024 Rubia Mims OT GO 1                 Rubia Mims OT  6/11/2024

## 2024-06-11 NOTE — PLAN OF CARE
Goal Outcome Evaluation:  Plan of Care Reviewed With: patient        Progress: no change  Outcome Evaluation: pt admitted towards end of shift. VSS, ox4, RA, up with standby to bedside commode. Pt in spore precautions for c-diff rule out, stool sample collected. Plan of care ongoing. No acute events occured during shift. Pt denied sob and pain. pt expresses no further needs or concerns at this time, call light within reach.

## 2024-06-11 NOTE — THERAPY EVALUATION
PT order was received.  Patient requested PT to hold evaluation due to frequent bowel movements.  PT will follow up tomorrow.

## 2024-06-12 ENCOUNTER — APPOINTMENT (OUTPATIENT)
Dept: GENERAL RADIOLOGY | Facility: HOSPITAL | Age: 59
DRG: 432 | End: 2024-06-12
Payer: MEDICAID

## 2024-06-12 LAB
ANION GAP SERPL CALCULATED.3IONS-SCNC: 12.4 MMOL/L (ref 5–15)
BUN SERPL-MCNC: 4 MG/DL (ref 6–20)
BUN/CREAT SERPL: 5.7 (ref 7–25)
CALCIUM SPEC-SCNC: 7 MG/DL (ref 8.6–10.5)
CHLORIDE SERPL-SCNC: 103 MMOL/L (ref 98–107)
CO2 SERPL-SCNC: 20.6 MMOL/L (ref 22–29)
CREAT SERPL-MCNC: 0.7 MG/DL (ref 0.57–1)
EGFRCR SERPLBLD CKD-EPI 2021: 101 ML/MIN/1.73
GLUCOSE SERPL-MCNC: 118 MG/DL (ref 65–99)
HCT VFR BLD AUTO: 24.9 % (ref 34–46.6)
HGB BLD-MCNC: 7.7 G/DL (ref 12–15.9)
MAGNESIUM SERPL-MCNC: 1.2 MG/DL (ref 1.6–2.6)
POTASSIUM SERPL-SCNC: 3.5 MMOL/L (ref 3.5–5.2)
POTASSIUM SERPL-SCNC: 3.8 MMOL/L (ref 3.5–5.2)
SODIUM SERPL-SCNC: 136 MMOL/L (ref 136–145)

## 2024-06-12 PROCEDURE — 85014 HEMATOCRIT: CPT | Performed by: FAMILY MEDICINE

## 2024-06-12 PROCEDURE — P9047 ALBUMIN (HUMAN), 25%, 50ML: HCPCS | Performed by: INTERNAL MEDICINE

## 2024-06-12 PROCEDURE — 85018 HEMOGLOBIN: CPT | Performed by: FAMILY MEDICINE

## 2024-06-12 PROCEDURE — 25010000002 MAGNESIUM SULFATE 2 GM/50ML SOLUTION: Performed by: FAMILY MEDICINE

## 2024-06-12 PROCEDURE — 94799 UNLISTED PULMONARY SVC/PX: CPT

## 2024-06-12 PROCEDURE — 80048 BASIC METABOLIC PNL TOTAL CA: CPT | Performed by: FAMILY MEDICINE

## 2024-06-12 PROCEDURE — 99232 SBSQ HOSP IP/OBS MODERATE 35: CPT | Performed by: FAMILY MEDICINE

## 2024-06-12 PROCEDURE — 84132 ASSAY OF SERUM POTASSIUM: CPT | Performed by: FAMILY MEDICINE

## 2024-06-12 PROCEDURE — 71045 X-RAY EXAM CHEST 1 VIEW: CPT

## 2024-06-12 PROCEDURE — 83735 ASSAY OF MAGNESIUM: CPT | Performed by: FAMILY MEDICINE

## 2024-06-12 PROCEDURE — 25010000002 ALBUMIN HUMAN 25% PER 50 ML: Performed by: INTERNAL MEDICINE

## 2024-06-12 PROCEDURE — 94761 N-INVAS EAR/PLS OXIMETRY MLT: CPT

## 2024-06-12 PROCEDURE — 94664 DEMO&/EVAL PT USE INHALER: CPT

## 2024-06-12 PROCEDURE — 25010000002 FUROSEMIDE PER 20 MG: Performed by: INTERNAL MEDICINE

## 2024-06-12 RX ORDER — MAGNESIUM SULFATE HEPTAHYDRATE 40 MG/ML
2 INJECTION, SOLUTION INTRAVENOUS
Qty: 150 ML | Refills: 0 | Status: COMPLETED | OUTPATIENT
Start: 2024-06-12 | End: 2024-06-12

## 2024-06-12 RX ORDER — VANCOMYCIN HYDROCHLORIDE 125 MG/1
125 CAPSULE ORAL EVERY 6 HOURS SCHEDULED
Status: DISCONTINUED | OUTPATIENT
Start: 2024-06-12 | End: 2024-06-14 | Stop reason: HOSPADM

## 2024-06-12 RX ORDER — POTASSIUM CHLORIDE 20 MEQ/1
40 TABLET, EXTENDED RELEASE ORAL EVERY 4 HOURS
Status: COMPLETED | OUTPATIENT
Start: 2024-06-12 | End: 2024-06-12

## 2024-06-12 RX ADMIN — FLUTICASONE PROPIONATE 2 SPRAY: 50 SPRAY, METERED NASAL at 08:44

## 2024-06-12 RX ADMIN — ALBUMIN (HUMAN) 25 G: 0.25 INJECTION, SOLUTION INTRAVENOUS at 15:14

## 2024-06-12 RX ADMIN — VANCOMYCIN HYDROCHLORIDE 125 MG: 125 CAPSULE ORAL at 10:35

## 2024-06-12 RX ADMIN — ALBUMIN (HUMAN) 25 G: 0.25 INJECTION, SOLUTION INTRAVENOUS at 21:09

## 2024-06-12 RX ADMIN — SPIRONOLACTONE 50 MG: 25 TABLET, FILM COATED ORAL at 08:44

## 2024-06-12 RX ADMIN — MONTELUKAST 10 MG: 10 TABLET, FILM COATED ORAL at 21:09

## 2024-06-12 RX ADMIN — VENLAFAXINE HYDROCHLORIDE 150 MG: 150 CAPSULE, EXTENDED RELEASE ORAL at 08:43

## 2024-06-12 RX ADMIN — FOLIC ACID 1 MG: 1 TABLET ORAL at 08:43

## 2024-06-12 RX ADMIN — POTASSIUM CHLORIDE 40 MEQ: 1500 TABLET, EXTENDED RELEASE ORAL at 08:43

## 2024-06-12 RX ADMIN — ATORVASTATIN CALCIUM 10 MG: 10 TABLET, FILM COATED ORAL at 08:44

## 2024-06-12 RX ADMIN — Medication 10 ML: at 08:44

## 2024-06-12 RX ADMIN — MAGNESIUM SULFATE HEPTAHYDRATE 2 G: 40 INJECTION, SOLUTION INTRAVENOUS at 16:04

## 2024-06-12 RX ADMIN — IPRATROPIUM BROMIDE AND ALBUTEROL SULFATE 3 ML: .5; 3 SOLUTION RESPIRATORY (INHALATION) at 22:38

## 2024-06-12 RX ADMIN — POTASSIUM CHLORIDE 40 MEQ: 1500 TABLET, EXTENDED RELEASE ORAL at 12:22

## 2024-06-12 RX ADMIN — MAGNESIUM SULFATE HEPTAHYDRATE 2 G: 40 INJECTION, SOLUTION INTRAVENOUS at 11:45

## 2024-06-12 RX ADMIN — Medication 10 ML: at 21:09

## 2024-06-12 RX ADMIN — MAGNESIUM SULFATE HEPTAHYDRATE 2 G: 40 INJECTION, SOLUTION INTRAVENOUS at 13:33

## 2024-06-12 RX ADMIN — BUDESONIDE 0.5 MG: 0.5 INHALANT RESPIRATORY (INHALATION) at 07:18

## 2024-06-12 RX ADMIN — FUROSEMIDE 20 MG: 10 INJECTION, SOLUTION INTRAMUSCULAR; INTRAVENOUS at 08:43

## 2024-06-12 RX ADMIN — VANCOMYCIN HYDROCHLORIDE 125 MG: 125 CAPSULE ORAL at 17:29

## 2024-06-12 RX ADMIN — THIAMINE HCL TAB 100 MG 100 MG: 100 TAB at 08:44

## 2024-06-12 RX ADMIN — ALBUMIN (HUMAN) 25 G: 0.25 INJECTION, SOLUTION INTRAVENOUS at 08:43

## 2024-06-12 RX ADMIN — BUDESONIDE 0.5 MG: 0.5 INHALANT RESPIRATORY (INHALATION) at 22:38

## 2024-06-12 NOTE — PLAN OF CARE
Goal Outcome Evaluation:  Plan of Care Reviewed With: patient        Progress: improving  Outcome Evaluation: Patient continues with frequent diarrhea which has improved from 2-3 episodes per hour to only a few episodes since 2pm. C diff+. Oral vancomycin administered as ordered. She continues with 3+ edema to BUE, BLE. IV Lasix administered. GI consulted. WOCN assess patient's left foot wound. See orders. Decreased O2 sat while sleeping this evening, O2 now at 3L per NC.

## 2024-06-12 NOTE — PLAN OF CARE
Goal Outcome Evaluation:  Plan of Care Reviewed With: patient        Progress: no change  Outcome Evaluation: VSS; continue albumin per order; pt had several BM's overnight

## 2024-06-12 NOTE — PROGRESS NOTES
[facility]    Name:  Nicole Silva   Age:  58 y.o.  Sex:  female  :  1965  MRN:  [id number]   Visit Number:  [id number]  Admission Date:  6/10/2024  Date Of Service:  24  Primary Care Physician:  Aleksandra Andres APRN     LOS: 1 day :    Chief Complaint:      Swelling, diarrhea    Subjective:    Discussed with patient she does appear to have C. difficile colitis which does explain her CAT scan findings and diarrhea.  Started her on oral vancomycin.  Discussed we will have to monitor her edema with this treatment, likely having protein enteropathy    Hospital Course:    58-year-old with a history of hypertension, COPD, recent diagnosis of a left foot laceration cellulitis, she is also diagnosed with norovirus recently.  She had actually been treated at this facility for the norovirus and dehydration.  She also had a prior history of alcohol use.    Workup in the emergency room have demonstrated overall stable vital signs.  She did have elevated proBNP, creatinine was 0.7.  White count was 14 hemoglobin is 9.2.  CT imaging was concerning for fluid overload, there was also notable hypomagnesemia and hypokalemia.  She was admitted to the hospital service    Patient was initially started on diuretic therapy.  GI consult made.  She also had positive C. difficile testing has been started on oral vancomycin.    Review of Systems:     All systems were reviewed and negative except as mentioned in subjective, assessment and plan.    Vital Signs:    Temp:  [98.3 °F (36.8 °C)-99 °F (37.2 °C)] 98.8 °F (37.1 °C)  Heart Rate:  [101-118] 118  Resp:  [16-20] 20  BP: (111-135)/(76-90) 126/87    Intake and output:    I/O last 3 completed shifts:  In: 1140 [P.O.:540; IV Piggyback:600]  Out: -   No intake/output data recorded.    Physical Examination:    General Appearance:  Alert and cooperative.  NAD   Head:  Atraumatic and normocephalic.   Eyes: Conjunctivae and sclerae  "normal, no icterus. No pallor.   Throat: No oral lesions, no thrush, oral mucosa moist.   Neck: Supple, trachea midline, no thyromegaly.   Lungs:   Breath sounds heard bilaterally equally.  No wheezing or crackles. No Pleural rub or bronchial breathing.   Heart:  Normal S1 and S2, no murmur, no gallop, no rub. No JVD.   Abdomen:   Normal bowel sounds, no masses, no organomegaly. Soft, nontender, nondistended, no rebound tenderness.   Extremities: Supple, 2-3+ edema, no cyanosis, no clubbing.  Chronic appearing wound of the left lower extremity   Skin: No bleeding or rash.   Neurologic: Alert and oriented x 3. No facial asymmetry. Moves all four limbs. No tremors.      Laboratory results:    Results from last 7 days   Lab Units 06/12/24  0521 06/11/24  0646 06/10/24  2228   SODIUM mmol/L 136 137 136   POTASSIUM mmol/L 3.5 3.9 2.8*   CHLORIDE mmol/L 103 106 102   CO2 mmol/L 20.6* 21.1* 20.1*   BUN mg/dL 4* 4* 4*   CREATININE mg/dL 0.70 0.62 0.74   CALCIUM mg/dL 7.0* 7.0* 7.1*   BILIRUBIN mg/dL  --  0.4 0.3   ALK PHOS U/L  --  243* 237*   ALT (SGPT) U/L  --  9 9   AST (SGOT) U/L  --  46* 34*   GLUCOSE mg/dL 118* 112* 129*     Results from last 7 days   Lab Units 06/12/24  0521 06/11/24  0646 06/10/24  2228   WBC 10*3/mm3  --  12.97* 14.30*   HEMOGLOBIN g/dL 7.7* 9.3* 9.2*   HEMATOCRIT % 24.9* 28.6* 28.6*   PLATELETS 10*3/mm3  --  245 247     Results from last 7 days   Lab Units 06/11/24  0646   INR  1.05     Results from last 7 days   Lab Units 06/11/24  0024 06/10/24  2228   HSTROP T ng/L 19* 19*         No results for input(s): \"PHART\", \"UHV0PLZ\", \"PO2ART\", \"QHZ6MVC\", \"BASEEXCESS\" in the last 8760 hours.   I have reviewed the patient's laboratory results.    Radiology results:    Adult Transthoracic Echo Complete W/ Cont if Necessary Per Protocol    Result Date: 6/11/2024  1.  Normal left ventricular size and systolic function, 3D EF 69%. 2.  Grade 1 diastolic dysfunction. 3.  Normal right ventricular size and " systolic function. 4.  Normal left atrial volume index. 5.  Mild mitral regurgitation. 6.  Mild tricuspid regurgitation.     XR Chest 1 View    Result Date: 6/11/2024  PROCEDURE: XR CHEST 1 VW-  HISTORY: Anasarca  COMPARISON: None.  FINDINGS: The heart is borderline enlarged. The mediastinum is unremarkable. The lungs are clear. There is no pneumothorax.  There are no acute osseous abnormalities.      Impression: No acute cardiopulmonary process.  Continued followup is recommended.      Images were reviewed, interpreted, and dictated by Dr. Amna Segovia MD Transcribed by Nicole Valdez PA-C.  This report was signed and finalized on 6/11/2024 8:22 AM by Amna Segovia MD.      CT Abdomen Pelvis Without Contrast    Result Date: 6/11/2024  PROCEDURE: CT ABDOMEN PELVIS WO CONTRAST-  HISTORY: Anasarca and hypoalbuminemia.  Rule out liver cirrhosis.; E87.6-Hypokalemia; E83.42-Hypomagnesemia; R60.1-Generalized edema  COMPARISON: None.  PROCEDURE: Axial images were obtained from the lung bases to the pubic symphysis by computed tomography. This study was performed with techniques to keep radiation doses as low as reasonably achievable, (ALARA). Individualized dose reduction techniques using automated exposure control or adjustment of mA and/or kV according to the patient size were employed.  FINDINGS:  ABDOMEN: The lung bases are clear except for a 4 mm pleural-based nodule lateral right lower lobe, no recommendation for follow-up per Fleischner's criteria. There are a few linear densities in both lower lobes, likely atelectasis. There is evidence of old calcified granulomatous disease. No pericardial or pleural effusions identified. The heart is proper size. The limited non-contrast images of the liver demonstrate no focal abnormality. Liver is enlarged at 20 cm. No nodularity of the contour is identified. Portal vein is within normal limits in size at 14 mm. Gallbladder present with no CT visible stones. The spleen is  normal in size with no focal mass. Vascular calcifications noted. Right adrenal gland is normal. There is mild fullness of the left adrenal gland. Splenule is identified adjacent to the splenic hilum. The aorta is normal in caliber. There is no significant free fluid or adenopathy.  There is no nephrolithiasis. There is no hydronephrosis. PELVIS: The GI tract demonstrate no obstruction. There is moderate wall thickening of the ascending colon and proximal two-thirds of the transverse colon suggesting colitis. No diverticula identified. There is mild infiltration of the surrounding fat at the hepatic flexure. Minimal fluid is identified in both paracolic gutters and the pelvis. Uterus is midline. There is a small exophytic nodule arising from the fundus, likely an exophytic fibroid. The appendix is not identified. The urinary bladder is almost completely collapsed. Small amount of high-attenuation material identified in the cecum and distal descending colon, consider minimal residual oral contrast. Diffuse subcutaneous edema identified. There is no fluid or adenopathy.      Impression: No hydronephrosis or nephrolithiasis.  Hepatomegaly with no other signs of cirrhosis, recommend correlation with liver function tests.  Evidence of colitis involving the ascending and proximal two-thirds of the transverse colon, consider infectious or inflammatory process with ischemic colitis not excluded.  Minimal ascites with significant diffuse subcutaneous edema.  Suspected small uterine fibroid.  CTDI: 6.01 mGy DLP:278.7 mGy.cm  This report was signed and finalized on 6/11/2024 7:41 AM by Amna Segovia MD.     I have reviewed the patient's radiology reports.    Medication Review:     I have reviewed the patient's active and prn medications.     Problem List:      Anasarca    Hypokalemia    Hypomagnesemia    Hypoalbuminemia      Assessment:    Anasarca with hypoalbuminemia, suspected liver disease.  C. difficile colitis  Suspected  alcoholic versus fatty liver disease.  Hypokalemia and hypomagnesemia, POA.  COPD, no exacerbation.  Essential hypertension.  History of alcohol abuse.  Recent history of left foot laceration, healing.    Plan:    Anasarca/hypoalbuminemia.  CT imaging did show some minimal amount of ascites, there was third spacing noted.  There is hepatomegaly. She also has ongoing colitis, she did have recent norovirus 2-1/2 weeks ago still with intermittent diarrhea.      Did consult with GI, appreciate his recommendations.    Patient did have subsequent C. difficile positive testing has been started on oral vancomycin on 6/12/2024.     Hypomagnesemia/hypokalemia.  Replacement as tolerated.  She is on diuretic therapy currently.  Will need monitored    Anticipate multiple days    DVT Prophylaxis: Enoxaparin  Code Status: Full  Diet: Cardiac  Discharge Plan: CARO Alatorre DO  06/12/24  12:59 EDT    Dictated utilizing Dragon dictation.

## 2024-06-12 NOTE — CASE MANAGEMENT/SOCIAL WORK
Met with pt at bedside. She requested a BSC at discharge. No other needs at this time. She is not medically stable for discharge today. CM continues to follow.

## 2024-06-12 NOTE — THERAPY EVALUATION
Pt declined PT eval this date d/t frequent diarrhea with movement. PT will follow-up as appropriate.

## 2024-06-12 NOTE — THERAPY TREATMENT NOTE
Pt tx held secondary to pt having frequent bowel movements and requesting OT to check back tomorrow. OT to follow up tomorrow.

## 2024-06-12 NOTE — NURSING NOTE
Seen for wound consult. Pleasant and cooperative with assessment. Findings discussed with Antonieta SEO.   Left ankle wound improved from last evaluation. Follows with podiatry. Reports is using collagen at home. Informed we do not have collagen available. Discussed options. Orders written to clean with wound cleanser, skin prep around wound, silvasorb hydrogel to wound, cover with optifoam border dressing. Change every 3 days.   Patient is able to move independently. Does report feeling weak from cdiff. Encourage to call for assistance as needed.   Staff to contact provider and re-consult wound nurse for new skin issues or lack of improvement with current orders. Thank you for the consult. If you have questions or concerns do not hesitate to contact me.

## 2024-06-13 ENCOUNTER — APPOINTMENT (OUTPATIENT)
Dept: GENERAL RADIOLOGY | Facility: HOSPITAL | Age: 59
DRG: 432 | End: 2024-06-13
Payer: MEDICAID

## 2024-06-13 LAB
A-A DO2: 10.8 MMHG
A-A DO2: 26.3 MMHG
ALBUMIN SERPL-MCNC: 3.6 G/DL (ref 3.5–5.2)
ALBUMIN/GLOB SERPL: 1.6 G/DL
ALP SERPL-CCNC: 160 U/L (ref 39–117)
ALT SERPL W P-5'-P-CCNC: 10 U/L (ref 1–33)
ANION GAP SERPL CALCULATED.3IONS-SCNC: 12.3 MMOL/L (ref 5–15)
ARTERIAL PATENCY WRIST A: POSITIVE
ARTERIAL PATENCY WRIST A: POSITIVE
AST SERPL-CCNC: 23 U/L (ref 1–32)
ATMOSPHERIC PRESS: 733 MMHG
ATMOSPHERIC PRESS: 734 MMHG
BASE EXCESS BLDA CALC-SCNC: -3.7 MMOL/L (ref 0–2)
BASE EXCESS BLDA CALC-SCNC: -7 MMOL/L (ref 0–2)
BDY SITE: ABNORMAL
BDY SITE: ABNORMAL
BILIRUB SERPL-MCNC: 0.5 MG/DL (ref 0–1.2)
BUN SERPL-MCNC: 4 MG/DL (ref 6–20)
BUN/CREAT SERPL: 5.6 (ref 7–25)
CALCIUM SPEC-SCNC: 7.2 MG/DL (ref 8.6–10.5)
CHLORIDE SERPL-SCNC: 102 MMOL/L (ref 98–107)
CO2 SERPL-SCNC: 21.7 MMOL/L (ref 22–29)
COHGB MFR BLD: 1.8 % (ref 0–2)
COHGB MFR BLD: 1.8 % (ref 0–2)
CREAT SERPL-MCNC: 0.72 MG/DL (ref 0.57–1)
EGFRCR SERPLBLD CKD-EPI 2021: 97.7 ML/MIN/1.73
GAS FLOW AIRWAY: 11 LPM
GAS FLOW AIRWAY: 12 LPM
GLOBULIN UR ELPH-MCNC: 2.2 GM/DL
GLUCOSE SERPL-MCNC: 171 MG/DL (ref 65–99)
HCO3 BLDA-SCNC: 20 MMOL/L (ref 22–28)
HCO3 BLDA-SCNC: 20.9 MMOL/L (ref 22–28)
HCT VFR BLD AUTO: 24.1 % (ref 34–46.6)
HCT VFR BLD CALC: 25.1 %
HCT VFR BLD CALC: 26 %
HGB BLD-MCNC: 7.6 G/DL (ref 12–15.9)
Lab: ABNORMAL
Lab: ABNORMAL
MAGNESIUM SERPL-MCNC: 1.9 MG/DL (ref 1.6–2.6)
METHGB BLD QL: 0.1 % (ref 0–1.5)
METHGB BLD QL: 0.4 % (ref 0–1.5)
MODALITY: ABNORMAL
MODALITY: ABNORMAL
OXYHGB MFR BLDV: 87 % (ref 94–99)
OXYHGB MFR BLDV: 96.8 % (ref 94–99)
PCO2 BLDA: 35.2 MM HG (ref 35–45)
PCO2 BLDA: 46.2 MM HG (ref 35–45)
PCO2 TEMP ADJ BLD: ABNORMAL MM[HG]
PCO2 TEMP ADJ BLD: ABNORMAL MM[HG]
PH BLDA: 7.24 PH UNITS (ref 7.3–7.5)
PH BLDA: 7.38 PH UNITS (ref 7.3–7.5)
PH, TEMP CORRECTED: ABNORMAL
PH, TEMP CORRECTED: ABNORMAL
PO2 BLDA: 65.7 MM HG (ref 75–100)
PO2 BLDA: 93.6 MM HG (ref 75–100)
PO2 TEMP ADJ BLD: ABNORMAL MM[HG]
PO2 TEMP ADJ BLD: ABNORMAL MM[HG]
POTASSIUM SERPL-SCNC: 4.1 MMOL/L (ref 3.5–5.2)
PROCALCITONIN SERPL-MCNC: 0.16 NG/ML (ref 0–0.25)
PROT SERPL-MCNC: 5.8 G/DL (ref 6–8.5)
SAO2 % BLDCOA: 88.8 % (ref 94–100)
SAO2 % BLDCOA: 98.9 % (ref 94–100)
SODIUM SERPL-SCNC: 136 MMOL/L (ref 136–145)
VENTILATOR MODE: ABNORMAL
VENTILATOR MODE: ABNORMAL

## 2024-06-13 PROCEDURE — 25010000002 LORAZEPAM PER 2 MG: Performed by: STUDENT IN AN ORGANIZED HEALTH CARE EDUCATION/TRAINING PROGRAM

## 2024-06-13 PROCEDURE — 94664 DEMO&/EVAL PT USE INHALER: CPT

## 2024-06-13 PROCEDURE — 85014 HEMATOCRIT: CPT | Performed by: FAMILY MEDICINE

## 2024-06-13 PROCEDURE — 25010000002 FUROSEMIDE PER 20 MG: Performed by: FAMILY MEDICINE

## 2024-06-13 PROCEDURE — 97162 PT EVAL MOD COMPLEX 30 MIN: CPT

## 2024-06-13 PROCEDURE — 94799 UNLISTED PULMONARY SVC/PX: CPT

## 2024-06-13 PROCEDURE — 82375 ASSAY CARBOXYHB QUANT: CPT

## 2024-06-13 PROCEDURE — 99233 SBSQ HOSP IP/OBS HIGH 50: CPT | Performed by: FAMILY MEDICINE

## 2024-06-13 PROCEDURE — 85018 HEMOGLOBIN: CPT | Performed by: FAMILY MEDICINE

## 2024-06-13 PROCEDURE — 83735 ASSAY OF MAGNESIUM: CPT | Performed by: FAMILY MEDICINE

## 2024-06-13 PROCEDURE — 82805 BLOOD GASES W/O2 SATURATION: CPT

## 2024-06-13 PROCEDURE — 80053 COMPREHEN METABOLIC PANEL: CPT | Performed by: FAMILY MEDICINE

## 2024-06-13 PROCEDURE — 94761 N-INVAS EAR/PLS OXIMETRY MLT: CPT

## 2024-06-13 PROCEDURE — 25010000002 FUROSEMIDE PER 20 MG: Performed by: STUDENT IN AN ORGANIZED HEALTH CARE EDUCATION/TRAINING PROGRAM

## 2024-06-13 PROCEDURE — 83050 HGB METHEMOGLOBIN QUAN: CPT

## 2024-06-13 PROCEDURE — 25010000002 METHYLPREDNISOLONE PER 125 MG: Performed by: STUDENT IN AN ORGANIZED HEALTH CARE EDUCATION/TRAINING PROGRAM

## 2024-06-13 PROCEDURE — 94660 CPAP INITIATION&MGMT: CPT

## 2024-06-13 PROCEDURE — 84145 PROCALCITONIN (PCT): CPT | Performed by: STUDENT IN AN ORGANIZED HEALTH CARE EDUCATION/TRAINING PROGRAM

## 2024-06-13 PROCEDURE — 71045 X-RAY EXAM CHEST 1 VIEW: CPT

## 2024-06-13 PROCEDURE — 97530 THERAPEUTIC ACTIVITIES: CPT

## 2024-06-13 PROCEDURE — 36600 WITHDRAWAL OF ARTERIAL BLOOD: CPT

## 2024-06-13 RX ORDER — FUROSEMIDE 10 MG/ML
20 INJECTION INTRAMUSCULAR; INTRAVENOUS
Status: DISCONTINUED | OUTPATIENT
Start: 2024-06-13 | End: 2024-06-14 | Stop reason: HOSPADM

## 2024-06-13 RX ORDER — FUROSEMIDE 10 MG/ML
40 INJECTION INTRAMUSCULAR; INTRAVENOUS ONCE
Status: COMPLETED | OUTPATIENT
Start: 2024-06-13 | End: 2024-06-13

## 2024-06-13 RX ORDER — METHYLPREDNISOLONE SODIUM SUCCINATE 125 MG/2ML
125 INJECTION, POWDER, LYOPHILIZED, FOR SOLUTION INTRAMUSCULAR; INTRAVENOUS ONCE
Status: COMPLETED | OUTPATIENT
Start: 2024-06-13 | End: 2024-06-13

## 2024-06-13 RX ORDER — LORAZEPAM 2 MG/ML
1 INJECTION INTRAMUSCULAR EVERY 4 HOURS PRN
Status: DISCONTINUED | OUTPATIENT
Start: 2024-06-13 | End: 2024-06-14 | Stop reason: HOSPADM

## 2024-06-13 RX ADMIN — BUDESONIDE 0.5 MG: 0.5 INHALANT RESPIRATORY (INHALATION) at 07:20

## 2024-06-13 RX ADMIN — FLUTICASONE PROPIONATE 2 SPRAY: 50 SPRAY, METERED NASAL at 08:53

## 2024-06-13 RX ADMIN — FUROSEMIDE 20 MG: 10 INJECTION, SOLUTION INTRAMUSCULAR; INTRAVENOUS at 17:51

## 2024-06-13 RX ADMIN — LORAZEPAM 1 MG: 2 INJECTION INTRAMUSCULAR; INTRAVENOUS at 00:37

## 2024-06-13 RX ADMIN — SPIRONOLACTONE 50 MG: 25 TABLET, FILM COATED ORAL at 08:52

## 2024-06-13 RX ADMIN — VANCOMYCIN HYDROCHLORIDE 125 MG: 125 CAPSULE ORAL at 23:05

## 2024-06-13 RX ADMIN — THIAMINE HCL TAB 100 MG 100 MG: 100 TAB at 08:52

## 2024-06-13 RX ADMIN — BUDESONIDE 0.5 MG: 0.5 INHALANT RESPIRATORY (INHALATION) at 19:58

## 2024-06-13 RX ADMIN — FUROSEMIDE 20 MG: 10 INJECTION, SOLUTION INTRAMUSCULAR; INTRAVENOUS at 08:53

## 2024-06-13 RX ADMIN — Medication 10 ML: at 08:52

## 2024-06-13 RX ADMIN — VANCOMYCIN HYDROCHLORIDE 125 MG: 125 CAPSULE ORAL at 17:51

## 2024-06-13 RX ADMIN — VENLAFAXINE HYDROCHLORIDE 150 MG: 150 CAPSULE, EXTENDED RELEASE ORAL at 08:52

## 2024-06-13 RX ADMIN — VANCOMYCIN HYDROCHLORIDE 125 MG: 125 CAPSULE ORAL at 05:19

## 2024-06-13 RX ADMIN — FOLIC ACID 1 MG: 1 TABLET ORAL at 08:52

## 2024-06-13 RX ADMIN — ATORVASTATIN CALCIUM 10 MG: 10 TABLET, FILM COATED ORAL at 08:52

## 2024-06-13 RX ADMIN — METHYLPREDNISOLONE SODIUM SUCCINATE 125 MG: 125 INJECTION, POWDER, FOR SOLUTION INTRAMUSCULAR; INTRAVENOUS at 00:38

## 2024-06-13 RX ADMIN — FUROSEMIDE 40 MG: 10 INJECTION, SOLUTION INTRAMUSCULAR; INTRAVENOUS at 00:38

## 2024-06-13 RX ADMIN — Medication 10 ML: at 20:22

## 2024-06-13 RX ADMIN — MONTELUKAST 10 MG: 10 TABLET, FILM COATED ORAL at 20:22

## 2024-06-13 RX ADMIN — IPRATROPIUM BROMIDE AND ALBUTEROL SULFATE 3 ML: .5; 3 SOLUTION RESPIRATORY (INHALATION) at 19:58

## 2024-06-13 RX ADMIN — VANCOMYCIN HYDROCHLORIDE 125 MG: 125 CAPSULE ORAL at 11:44

## 2024-06-13 NOTE — CASE MANAGEMENT/SOCIAL WORK
Continued Stay Note  ADENIKE Elmore     Patient Name: Nicole Silva  MRN: [id number]  Today's Date: 6/13/2024    Admit Date: 6/10/2024    Plan: per rn pt able to wean from 12L back down to 3L today; after lasix; cm will continue to follow pt for cm needs   Discharge Plan       Row Name 06/13/24 1332       Plan    Plan per rn pt able to wean from 12L back down to 3L today; after lasix; cm will continue to follow pt for cm needs                   Discharge Codes    No documentation.                       Eulalia Alfonso RN

## 2024-06-13 NOTE — PLAN OF CARE
Goal Outcome Evaluation:           Progress: improving  Outcome Evaluation: OT tx completed. Pt lying supine upon OT arrival. Pt agreeable to tx. Pt able to complete supine to sit EOB with SBA. Pt able to sit EOB to don (B) socks with MI. Pt reqs min A to thread RLE into brief. Pt able to complete STS with CGA. Pt able to pull brief up around waist with CGA. Pt completes fxl mobility x42 ft with RW and CGA with no AD. Pt had rest break and required use of RW for safety concerns. Pt completed additional x94ft with RW and CGA. Pt O2 92% on room air upon return. Pt returned EOB with CGA and able to return supine in bed with SBA. Pt left lying supine in bed with bed alarm activated and needs in reach. OT to continue per POC.

## 2024-06-13 NOTE — PLAN OF CARE
Goal Outcome Evaluation:  Plan of Care Reviewed With: patient        Progress: improving  Outcome Evaluation: Pt participated in PT eval this date. Pt transferred to EOB with SBA. Pt ambulated 42 ft with no AD, Pt unsteady reaching for stability. Pt ambulated the remaining 94ft with Rwx with improved endurance and stability. Pt's O2 sats were 96% on supplemental O2, then 91% on RA after gait. Pt would benefit from rollator and BSC at d/c.      Anticipated Discharge Disposition (PT): home with assist, home with home health

## 2024-06-13 NOTE — THERAPY TREATMENT NOTE
Patient Name: Nicole Silva  : 1965    MRN: [id number]                              Today's Date: 2024       Admit Date: 6/10/2024    Visit Dx:     ICD-10-CM ICD-9-CM   1. Hypokalemia  E87.6 276.8   2. Hypomagnesemia  E83.42 275.2   3. Anasarca  R60.1 782.3     Patient Active Problem List   Diagnosis    Cellulitis of left foot    Lactic acidosis    Hypokalemia    Hypomagnesemia    Anasarca    Hypoalbuminemia     Past Medical History:   Diagnosis Date    COPD (chronic obstructive pulmonary disease)     Hypertension      Past Surgical History:   Procedure Laterality Date     SECTION  ,      General Information       Row Name 24 1314          OT Time and Intention    Document Type therapy note (daily note)  -DB     Mode of Treatment occupational therapy  -DB               User Key  (r) = Recorded By, (t) = Taken By, (c) = Cosigned By      Initials Name Provider Type    DB Herson Cooley OT Occupational Therapist                     Mobility/ADL's       Row Name 24 1315          Bed Mobility    Bed Mobility supine-sit;sit-supine  -DB     Supine-Sit Witt (Bed Mobility) standby assist  -DB     Sit-Supine Witt (Bed Mobility) standby assist  -DB     Assistive Device (Bed Mobility) head of bed elevated;bed rails  -DB       Row Name 24 1315          Transfers    Transfers sit-stand transfer  -DB       Row Name 24 1315          Sit-Stand Transfer    Sit-Stand Witt (Transfers) contact guard  -DB     Assistive Device (Sit-Stand Transfers) walker, front-wheeled  -DB       Row Name 24 1315          Functional Mobility    Functional Mobility- Ind. Level contact guard assist  -DB     Functional Mobility-Distance (Feet) 94  -DB     Patient was able to Ambulate yes  -DB       Row Name 24 1315          Lower Body Dressing Assessment/Training    Witt Level (Lower Body Dressing) don;pants/bottoms;socks;minimum assist (75% patient  effort)  -DB               User Key  (r) = Recorded By, (t) = Taken By, (c) = Cosigned By      Initials Name Provider Type    DB Herson Cooley, OT Occupational Therapist                   Obj/Interventions    No documentation.                  Goals/Plan    No documentation.                  Clinical Impression       Row Name 06/13/24 1317          Pain Assessment    Pretreatment Pain Rating 0/10 - no pain  -DB     Posttreatment Pain Rating 0/10 - no pain  -DB       Row Name 06/13/24 1317          Plan of Care Review    Progress improving  -DB     Outcome Evaluation OT tx completed. Pt lying supine upon OT arrival. Pt agreeable to tx. Pt able to complete supine to sit EOB with SBA. Pt able to sit EOB to don (B) socks with MI. Pt reqs min A to thread RLE into brief. Pt able to complete STS with CGA. Pt able to pull brief up around waist with CGA. Pt completes fxl mobility x42 ft with RW and CGA with no AD. Pt had rest break and required use of RW for safety concerns. Pt completed additional x94ft with RW and CGA. Pt O2 92% on room air upon return. Pt returned EOB with CGA and able to return supine in bed with SBA. Pt left lying supine in bed with bed alarm activated and needs in reach. OT to continue per POC.  -DB       Row Name 06/13/24 1317          Vital Signs    Pre SpO2 (%) 96  -DB     O2 Delivery Pre Treatment supplemental O2  -DB     Intra SpO2 (%) 92  -DB     O2 Delivery Intra Treatment room air  -DB     Post SpO2 (%) 91  -DB     O2 Delivery Post Treatment room air  -DB     Pre Patient Position Supine  -DB     Intra Patient Position Standing  -DB     Post Patient Position Sitting  -DB       Row Name 06/13/24 1317          Positioning and Restraints    Pre-Treatment Position in bed  -DB     Post Treatment Position bed  -DB     In Bed sitting EOB;call light within reach;encouraged to call for assist;exit alarm on  -DB               User Key  (r) = Recorded By, (t) = Taken By, (c) = Cosigned By       Initials Name Provider Type    Herson Obregon OT Occupational Therapist                   Outcome Measures       Row Name 06/13/24 1321          How much help from another is currently needed...    Putting on and taking off regular lower body clothing? 3  -DB     Bathing (including washing, rinsing, and drying) 3  -DB     Toileting (which includes using toilet bed pan or urinal) 3  -DB     Putting on and taking off regular upper body clothing 4  -DB     Taking care of personal grooming (such as brushing teeth) 4  -DB     Eating meals 4  -DB     AM-PAC 6 Clicks Score (OT) 21  -DB       Row Name 06/13/24 0800          How much help from another person do you currently need...    Turning from your back to your side while in flat bed without using bedrails? 4  -CD     Moving from lying on back to sitting on the side of a flat bed without bedrails? 4  -CD     Moving to and from a bed to a chair (including a wheelchair)? 3  -CD     Standing up from a chair using your arms (e.g., wheelchair, bedside chair)? 3  -CD     Climbing 3-5 steps with a railing? 2  -CD     To walk in hospital room? 3  -CD     AM-PAC 6 Clicks Score (PT) 19  -CD     Highest Level of Mobility Goal 6 --> Walk 10 steps or more  -CD       Row Name 06/13/24 1321          Functional Assessment    Outcome Measure Options AM-PAC 6 Clicks Daily Activity (OT)  -DB               User Key  (r) = Recorded By, (t) = Taken By, (c) = Cosigned By      Initials Name Provider Type    Herson Obregon OT Occupational Therapist    Antonieta Baptiste, RN Registered Nurse                    Occupational Therapy Education       Title: PT OT SLP Therapies (In Progress)       Topic: Occupational Therapy (In Progress)       Point: ADL training (Done)       Description:   Instruct learner(s) on proper safety adaptation and remediation techniques during self care or transfers.   Instruct in proper use of assistive devices.                  Learning Progress  Summary             Patient Acceptance, E,TB, VU by DB at 6/13/2024 1321    Comment: Pt educated on ADL retraining for LBD. Pt receptive this date.    Acceptance, E,TB, VU by SD at 6/11/2024 1647    Comment: OT POC                         Point: Home exercise program (Not Started)       Description:   Instruct learner(s) on appropriate technique for monitoring, assisting and/or progressing therapeutic exercises/activities.                  Learner Progress:  Not documented in this visit.              Point: Precautions (Not Started)       Description:   Instruct learner(s) on prescribed precautions during self-care and functional transfers.                  Learner Progress:  Not documented in this visit.              Point: Body mechanics (Not Started)       Description:   Instruct learner(s) on proper positioning and spine alignment during self-care, functional mobility activities and/or exercises.                  Learner Progress:  Not documented in this visit.                              User Key       Initials Effective Dates Name Provider Type Discipline    SD 06/16/21 -  Rubia Mims, OT Occupational Therapist OT    CASIMIRO 07/06/23 -  Herson Cooley OT Occupational Therapist OT                  OT Recommendation and Plan     Plan of Care Review  Progress: improving  Outcome Evaluation: OT tx completed. Pt lying supine upon OT arrival. Pt agreeable to tx. Pt able to complete supine to sit EOB with SBA. Pt able to sit EOB to don (B) socks with MI. Pt reqs min A to thread RLE into brief. Pt able to complete STS with CGA. Pt able to pull brief up around waist with CGA. Pt completes fxl mobility x42 ft with RW and CGA with no AD. Pt had rest break and required use of RW for safety concerns. Pt completed additional x94ft with RW and CGA. Pt O2 92% on room air upon return. Pt returned EOB with CGA and able to return supine in bed with SBA. Pt left lying supine in bed with bed alarm activated and needs in  reach. OT to continue per POC.     Time Calculation:         Time Calculation- OT       Row Name 06/13/24 1322             Time Calculation- OT    OT Start Time 1021  -DB      OT Stop Time 1044  -DB      OT Time Calculation (min) 23 min  -DB      OT Received On 06/13/24  -DB      OT Goal Re-Cert Due Date 06/21/24  -DB         Timed Charges    20011 - OT Therapeutic Activity Minutes 23  -DB         Total Minutes    Timed Charges Total Minutes 23  -DB       Total Minutes 23  -DB                User Key  (r) = Recorded By, (t) = Taken By, (c) = Cosigned By      Initials Name Provider Type    DB Herson Cooley, CHRISTINA Occupational Therapist                  Therapy Charges for Today       Code Description Service Date Service Provider Modifiers Qty    [id number] HC OT THERAPEUTIC ACT EA 15 MIN 6/13/2024 Herson Cooley OT GO 2                 Herson Cooley OT  6/13/2024

## 2024-06-13 NOTE — PLAN OF CARE
Goal Outcome Evaluation:              Outcome Evaluation: VSS on 60% Bipap. Patient's oxygen demands started to increase from 3L NC to 5L NC, patient's oxygen kept declining. Patient was ultimately placed on 12L HFNC, patient was complaining of shortness of breath. Expiratory wheezing noted. MD notified. Patient recieved solu-medrol, ativan, and lasix. CXR & ABG performed. Patient also continues to have green colored liquid bowel movements.

## 2024-06-13 NOTE — THERAPY EVALUATION
Patient Name: Nicole Silva  : 1965    MRN: [id number]                              Today's Date: 2024       Admit Date: 6/10/2024    Visit Dx:     ICD-10-CM ICD-9-CM   1. Hypokalemia  E87.6 276.8   2. Hypomagnesemia  E83.42 275.2   3. Anasarca  R60.1 782.3     Patient Active Problem List   Diagnosis    Cellulitis of left foot    Lactic acidosis    Hypokalemia    Hypomagnesemia    Anasarca    Hypoalbuminemia     Past Medical History:   Diagnosis Date    COPD (chronic obstructive pulmonary disease)     Hypertension      Past Surgical History:   Procedure Laterality Date     SECTION  ,      General Information       Row Name 24 1328          Physical Therapy Time and Intention    Document Type therapy note (daily note)  -MS     Mode of Treatment physical therapy  -MS       Row Name 24 1328          General Information    Patient Profile Reviewed yes  -MS     Prior Level of Function independent:;all household mobility;community mobility  -MS     Existing Precautions/Restrictions fall  -MS     Barriers to Rehab medically complex  -MS       Row Name 24 1328          Living Environment    People in Home child(isabel), adult  -MS       Row Name 24 1328          Cognition    Orientation Status (Cognition) oriented x 4  -MS       Row Name 24 1328          Safety Issues, Functional Mobility    Safety Issues Affecting Function (Mobility) safety precautions follow-through/compliance;safety precaution awareness  -MS     Impairments Affecting Function (Mobility) balance;endurance/activity tolerance;strength;pain  -MS               User Key  (r) = Recorded By, (t) = Taken By, (c) = Cosigned By      Initials Name Provider Type    Dangelo Diana PT Physical Therapist                   Mobility       Row Name 24 1329          Bed Mobility    Bed Mobility supine-sit;sit-supine  -MS     Supine-Sit [place] (Bed Mobility) standby assist  -MS     Sit-Supine  Sharp (Bed Mobility) standby assist  -MS     Assistive Device (Bed Mobility) head of bed elevated;bed rails  -MS       Row Name 06/13/24 1329          Sit-Stand Transfer    Sit-Stand [place] (Transfers) contact guard  -MS     Assistive Device (Sit-Stand Transfers) walker, front-wheeled  -MS       Row Name 06/13/24 1329          Gait/Stairs (Locomotion)    Sharp Level (Gait) contact guard  -MS     Assistive Device (Gait) walker, front-wheeled  -MS     Patient was able to Ambulate yes  -MS     Distance in Feet (Gait) 94  42  -MS     Deviations/Abnormal Patterns (Gait) festinating/shuffling;gait speed decreased;base of support, narrow  -MS               User Key  (r) = Recorded By, (t) = Taken By, (c) = Cosigned By      Initials Name Provider Type    Dangelo Diana, PT Physical Therapist                   Obj/Interventions       Row Name 06/13/24 1341          Range of Motion Comprehensive    General Range of Motion bilateral lower extremity ROM WFL  -MS       Row Name 06/13/24 1341          Strength Comprehensive (MMT)    General Manual Muscle Testing (MMT) Assessment lower extremity strength deficits identified  -MS     Comment, General Manual Muscle Testing (MMT) Assessment B LE 4-/5  -MS               User Key  (r) = Recorded By, (t) = Taken By, (c) = Cosigned By      Initials Name Provider Type    Dangelo Diana, PT Physical Therapist                   Goals/Plan       Row Name 06/13/24 1349          Bed Mobility Goal 1 (PT)    Activity/Assistive Device (Bed Mobility Goal 1, PT) bed mobility activities, all  -MS     Sharp Level/Cues Needed (Bed Mobility Goal 1, PT) modified independence  -MS     Time Frame (Bed Mobility Goal 1, PT) long term goal (LTG);2 weeks  -MS       Row Name 06/13/24 1349          Transfer Goal 1 (PT)    Activity/Assistive Device (Transfer Goal 1, PT) transfers, all;sit-to-stand/stand-to-sit  -MS     Sharp Level/Cues Needed (Transfer Goal 1, PT)  standby assist  -MS     Time Frame (Transfer Goal 1, PT) long term goal (LTG);2 weeks  -MS       Row Name 06/13/24 1349          Gait Training Goal 1 (PT)    Activity/Assistive Device (Gait Training Goal 1, PT) gait (walking locomotion);assistive device use  -MS     Bailey Level (Gait Training Goal 1, PT) standby assist  -MS     Distance (Gait Training Goal 1, PT) 300  -MS     Time Frame (Gait Training Goal 1, PT) long term goal (LTG);2 weeks  -MS       Row Name 06/13/24 1349          Patient Education Goal (PT)    Activity (Patient Education Goal, PT) ther exer x15 reps  -MS     Bailey/Cues/Accuracy (Memory Goal 2, PT) demonstrates adequately  -MS     Time Frame (Patient Education Goal, PT) short term goal (STG);1 week  -MS       Row Name 06/13/24 1349          Therapy Assessment/Plan (PT)    Planned Therapy Interventions (PT) balance training;bed mobility training;gait training;home exercise program;stair training;ROM (range of motion);neuromuscular re-education;strengthening;patient/family education;transfer training  -MS               User Key  (r) = Recorded By, (t) = Taken By, (c) = Cosigned By      Initials Name Provider Type    MS Dangelo Teixeira, PT Physical Therapist                   Clinical Impression       Row Name 06/13/24 1341          Pain    Pretreatment Pain Rating 0/10 - no pain  -MS     Posttreatment Pain Rating 0/10 - no pain  -MS       Row Name 06/13/24 1341          Plan of Care Review    Plan of Care Reviewed With patient  -MS     Progress improving  -MS     Outcome Evaluation Pt participated in PT eval this date. Pt transferred to EOB with SBA. Pt ambulated 42 ft with no AD, Pt unsteady reaching for stability. Pt ambulated the remaining 94ft with Rwx with improved endurance and stability. Pt's O2 sats were 96% on supplemental O2, then 91% on RA after gait. Pt would benefit from rollator and BSC at d/c.  -MS       Row Name 06/13/24 1344          Therapy Assessment/Plan (PT)     Patient/Family Therapy Goals Statement (PT) to go home  -MS     Rehab Potential (PT) good, to achieve stated therapy goals  -MS     Criteria for Skilled Interventions Met (PT) yes;meets criteria  -MS     Therapy Frequency (PT) 5 times/wk  -MS       Row Name 06/13/24 1341          Vital Signs    Pre SpO2 (%) 96  -MS     O2 Delivery Pre Treatment supplemental O2  -MS     Intra SpO2 (%) 92  -MS     O2 Delivery Intra Treatment room air  -MS     Post SpO2 (%) 91  -MS     O2 Delivery Post Treatment room air  -MS     Pre Patient Position Supine  -MS     Intra Patient Position Standing  -MS     Post Patient Position Supine  -MS       Row Name 06/13/24 1341          Positioning and Restraints    Pre-Treatment Position in bed  -MS     Post Treatment Position bed  -MS     In Bed side lying right;call light within reach;encouraged to call for assist;patient within staff view;exit alarm on  -MS               User Key  (r) = Recorded By, (t) = Taken By, (c) = Cosigned By      Initials Name Provider Type    Dangelo Diana, PT Physical Therapist                   Outcome Measures       Row Name 06/13/24 1350 06/13/24 0800       How much help from another person do you currently need...    Turning from your back to your side while in flat bed without using bedrails? 4  -MS 4  -CD    Moving from lying on back to sitting on the side of a flat bed without bedrails? 4  -MS 4  -CD    Moving to and from a bed to a chair (including a wheelchair)? 3  -MS 3  -CD    Standing up from a chair using your arms (e.g., wheelchair, bedside chair)? 3  -MS 3  -CD    Climbing 3-5 steps with a railing? 3  -MS 2  -CD    To walk in hospital room? 3  -MS 3  -CD    AM-PAC 6 Clicks Score (PT) 20  -MS 19  -CD    Highest Level of Mobility Goal 6 --> Walk 10 steps or more  -MS 6 --> Walk 10 steps or more  -CD      Row Name 06/13/24 1321          Functional Assessment    Outcome Measure Options AM-PAC 6 Clicks Daily Activity (OT)  -DB               User Key   (r) = Recorded By, (t) = Taken By, (c) = Cosigned By      Initials Name Provider Type    MS Dangelo Teixeira, PT Physical Therapist    Herson Obregon, OT Occupational Therapist    Antonieta Baptiste, RN Registered Nurse                                 Physical Therapy Education       Title: PT OT SLP Therapies (In Progress)       Topic: Physical Therapy (In Progress)       Point: Mobility training (Done)       Learning Progress Summary             Patient Acceptance, E, VU by MS at 6/13/2024 1350    Comment: importance of mobility                         Point: Home exercise program (Done)       Learning Progress Summary             Patient Acceptance, E, VU by MS at 6/13/2024 1350    Comment: importance of mobility                         Point: Body mechanics (Not Started)       Learner Progress:  Not documented in this visit.              Point: Precautions (Not Started)       Learner Progress:  Not documented in this visit.                              User Key       Initials Effective Dates Name Provider Type Discipline    MS 08/22/23 -  Dangelo Teixeira PT Physical Therapist PT                  PT Recommendation and Plan  Planned Therapy Interventions (PT): balance training, bed mobility training, gait training, home exercise program, stair training, ROM (range of motion), neuromuscular re-education, strengthening, patient/family education, transfer training  Plan of Care Reviewed With: patient  Progress: improving  Outcome Evaluation: Pt participated in PT eval this date. Pt transferred to EOB with SBA. Pt ambulated 42 ft with no AD, Pt unsteady reaching for stability. Pt ambulated the remaining 94ft with Rwx with improved endurance and stability. Pt's O2 sats were 96% on supplemental O2, then 91% on RA after gait. Pt would benefit from rollator and BSC at d/c.     Time Calculation:   PT Evaluation Complexity  History, PT Evaluation Complexity: 1-2 personal factors and/or  comorbidities  Examination of Body Systems (PT Eval Complexity): total of 3 or more elements  Clinical Presentation (PT Evaluation Complexity): evolving  Clinical Decision Making (PT Evaluation Complexity): moderate complexity  Overall Complexity (PT Evaluation Complexity): moderate complexity     PT Charges       Row Name 06/13/24 1351             Time Calculation    Start Time 1020  -MS      PT Received On 06/13/24  -MS      PT Goal Re-Cert Due Date 06/23/24  -MS         Untimed Charges    PT Eval/Re-eval Minutes 57  -MS         Total Minutes    Untimed Charges Total Minutes 57  -MS       Total Minutes 57  -MS                User Key  (r) = Recorded By, (t) = Taken By, (c) = Cosigned By      Initials Name Provider Type    MS Dangelo Teixeira, PT Physical Therapist                  Therapy Charges for Today       Code Description Service Date Service Provider Modifiers Qty    93953215726 HC PT EVAL MOD COMPLEXITY 4 6/13/2024 Dangelo Teixeira, PT GP 1            PT G-Codes  Outcome Measure Options: AM-PAC 6 Clicks Daily Activity (OT)  AM-PAC 6 Clicks Score (PT): 20  AM-PAC 6 Clicks Score (OT): 21  PT Discharge Summary  Anticipated Discharge Disposition (PT): home with assist, home with home health    Dangelo Teixeira PT  6/13/2024

## 2024-06-13 NOTE — PLAN OF CARE
Goal Outcome Evaluation:  Plan of Care Reviewed With: patient        Progress: improving  Outcome Evaluation: O2 sats stable on RA when sitting up/awake but drop into the high 80's when sleeping/laying down. O2 at 3L. Lung CTA/diminished today. BUE/BLE edema is unchanged. IV Lasix administered as ordered. Patient reports that she feels much better today and denies SOB/difficulty breathing. 4 small bowel movements today which is significantly less than yesterday. She has been eating more and has more energy today. She walked down the mac with PT. Per MD, patient may discharge home tomorrow if she continues to improve.

## 2024-06-13 NOTE — PROGRESS NOTES
[facility]    Name:  Nicole Silva   Age:  58 y.o.  Sex:  female  :  1965  MRN:  [id number]   Visit Number:  [id number]  Admission Date:  6/10/2024  Date Of Service:  24  Primary Care Physician:  Aleksandra Andres APRN     LOS: 2 days :    Chief Complaint:      Swelling, diarrhea    Subjective:    Events reviewed overnight, talked with nocturnist.  She is feeling better today, less short of breath.  She notes only 2 bowel movements in the last 12 hours which is an improvement.  I discussed her x-ray and labs with her.    Hospital Course:    58-year-old with a history of hypertension, COPD, recent diagnosis of a left foot laceration cellulitis, she is also diagnosed with norovirus recently.  She had actually been treated at this facility for the norovirus and dehydration.  She also had a prior history of alcohol use.    Workup in the emergency room have demonstrated overall stable vital signs.  She did have elevated proBNP, creatinine was 0.7.  White count was 14 hemoglobin is 9.2.  CT imaging was concerning for fluid overload, there was also notable hypomagnesemia and hypokalemia.  She was admitted to the hospital service    Patient was initially started on diuretic therapy.  GI consult made.  She also had positive C. difficile testing has been started on oral vancomycin.  She did unfortunately develop evidence of likely volume overload on evening of 2024, however it improved with diuretic therapy and breathing treatments.    Review of Systems:     All systems were reviewed and negative except as mentioned in subjective, assessment and plan.    Vital Signs:    Temp:  [97.7 °F (36.5 °C)-98.3 °F (36.8 °C)] 98 °F (36.7 °C)  Heart Rate:  [] 103  Resp:  [18-22] 18  BP: ()/(61-93) 99/68    Intake and output:    I/O last 3 completed shifts:  In: 120 [P.O.:120]  Out: -   I/O this shift:  In: 360 [P.O.:360]  Out: -     Physical  Examination:    General Appearance:  Alert and cooperative.  NAD   Head:  Atraumatic and normocephalic.   Eyes: Conjunctivae and sclerae normal, no icterus. No pallor.   Throat: No oral lesions, no thrush, oral mucosa moist.   Neck: Supple, trachea midline, no thyromegaly.   Lungs:   Scattered crackles nontachypneic at rest.  Nasal cannula oxygen in place   Heart:  Normal S1 and S2, no murmur, no gallop, no rub. No JVD.   Abdomen:   Normal bowel sounds, no masses, no organomegaly. Soft, nontender, nondistended, no rebound tenderness.   Extremities: Supple, 2-3+ edema, no cyanosis, no clubbing.  Chronic appearing wound of the left lower extremity   Skin: No bleeding or rash.   Neurologic: Alert and oriented x 3. No facial asymmetry. Moves all four limbs. No tremors.      Laboratory results:    Results from last 7 days   Lab Units 06/13/24  0548 06/12/24  1624 06/12/24  0521 06/11/24  0646 06/10/24  2228   SODIUM mmol/L 136  --  136 137 136   POTASSIUM mmol/L 4.1 3.8 3.5 3.9 2.8*   CHLORIDE mmol/L 102  --  103 106 102   CO2 mmol/L 21.7*  --  20.6* 21.1* 20.1*   BUN mg/dL 4*  --  4* 4* 4*   CREATININE mg/dL 0.72  --  0.70 0.62 0.74   CALCIUM mg/dL 7.2*  --  7.0* 7.0* 7.1*   BILIRUBIN mg/dL 0.5  --   --  0.4 0.3   ALK PHOS U/L 160*  --   --  243* 237*   ALT (SGPT) U/L 10  --   --  9 9   AST (SGOT) U/L 23  --   --  46* 34*   GLUCOSE mg/dL 171*  --  118* 112* 129*     Results from last 7 days   Lab Units 06/13/24  0548 06/12/24  0521 06/11/24  0646 06/10/24  2228   WBC 10*3/mm3  --   --  12.97* 14.30*   HEMOGLOBIN g/dL 7.6* 7.7* 9.3* 9.2*   HEMATOCRIT % 24.1* 24.9* 28.6* 28.6*   PLATELETS 10*3/mm3  --   --  245 247     Results from last 7 days   Lab Units 06/11/24  0646   INR  1.05     Results from last 7 days   Lab Units 06/11/24  0024 06/10/24  2228   HSTROP T ng/L 19* 19*         Recent Labs     06/13/24  0001 06/13/24  0526   PHART 7.244* 7.383   GSV6GYE 46.2* 35.2   PO2ART 65.7* 93.6   GAX3KKN 20.0* 20.9*    BASEEXCESS -7.0* -3.7*      I have reviewed the patient's laboratory results.    Radiology results:    XR Chest 1 View    Result Date: 6/13/2024  PROCEDURE: XR CHEST 1 VW-  HISTORY: hypoxia f/u; E87.6-Hypokalemia; E83.42-Hypomagnesemia; R60.1-Generalized edema  COMPARISON: June 12, 2024..  FINDINGS: The heart is normal in size. There is bibasilar airspace disease, atelectasis versus infiltrate, worsened from prior exam. Small right effusion again noted.. The mediastinum is unremarkable. There is no pneumothorax.  There are no acute osseous abnormalities. Apical lordotic positioning noted.      Impression: Worsened bibasilar atelectasis or infiltrate, recommend continued follow-up..     This report was signed and finalized on 6/13/2024 7:22 AM by Amna Segovia MD.      XR Chest 1 View    Result Date: 6/13/2024  FINAL REPORT TECHNIQUE: null CLINICAL HISTORY: hypoxia COMPARISON: null FINDINGS: Chest X-ray, 1 View COMPARISON: None FINDINGS: Diffuse bilateral airspace disease with patchy areas of consolidation most pronounced in the right lung. Blunting of the right costophrenic angle. No pneumothorax. No cardiomegaly. No acute fracture.     Impression: IMPRESSION: Bilateral airspace disease with areas of consolidation, which could be due to pulmonary edema and/or pneumonia. Right pleural effusion. Authenticated and Electronically Signed by Aaron Vigil MD on 06/13/2024 12:23:23 AM   I have reviewed the patient's radiology reports.    Medication Review:     I have reviewed the patient's active and prn medications.     Problem List:      Anasarca    Hypokalemia    Hypomagnesemia    Hypoalbuminemia      Assessment:    Anasarca with hypoalbuminemia, suspected liver disease.  C. difficile colitis  Suspected alcoholic versus fatty liver disease.  Hypokalemia and hypomagnesemia, POA.  COPD, no exacerbation.  Essential hypertension.  History of alcohol abuse.  Recent history of left foot laceration,  healing.    Plan:    Anasarca/hypoalbuminemia.  CT imaging did show some minimal amount of ascites, there was third spacing noted.  There is hepatomegaly. She also has ongoing colitis, she did have recent norovirus 2-1/2 weeks ago still with intermittent diarrhea.      Did consult with GI, appreciate his recommendations.    Patient did have subsequent C. difficile positive testing has been started on oral vancomycin on 6/12/2024.     Hypomagnesemia/hypokalemia.  Replacement as tolerated.  She is on diuretic therapy currently.  Will need monitored    Respiratory failure:  I suspect related to volume overload, poss related to albumin infusion.  She has diuresed well with the increase Lasix and will continue her spironolactone.  Hopefully will come off oxygen by tomorrow.    Possible discharge tomorrow    DVT Prophylaxis: Enoxaparin  Code Status: Full  Diet: Cardiac  Discharge Plan: Discharge tomorrow    Pat Alatorre DO  06/13/24  16:44 EDT    Dictated utilizing Dragon dictation.

## 2024-06-13 NOTE — PROGRESS NOTES
Notified by nursing stepwise increasing oxygen requirement up to high flow.  At bedside examination breath sounds equal bilateral, moderately diminished, with some expiratory wheezing.  Minimal improvement with provided breathing treatment.  Patient able to sit up and stand without difficulty.  Increased work of breathing but still speaking in short sentences comfortably.  CXR per radiology interpretation; bilateral airspace disease with areas of consolidation which could be due to pulmonary edema and/or pneumonia, right pleural effusion.  ABG combined respiratory and metabolic acidosis pH 7.24, pCO2 46, pO2 65, bicarb 20.  Provided BiPAP.  Procalcitonin negative.  Pneumonia not suspected.  Provided Lasix, Solu-Medrol, Ativan.

## 2024-06-14 ENCOUNTER — READMISSION MANAGEMENT (OUTPATIENT)
Dept: CALL CENTER | Facility: HOSPITAL | Age: 59
End: 2024-06-14
Payer: MEDICAID

## 2024-06-14 VITALS
DIASTOLIC BLOOD PRESSURE: 73 MMHG | OXYGEN SATURATION: 97 % | SYSTOLIC BLOOD PRESSURE: 108 MMHG | BODY MASS INDEX: 24.77 KG/M2 | HEIGHT: 64 IN | WEIGHT: 145.06 LBS | RESPIRATION RATE: 18 BRPM | HEART RATE: 100 BPM | TEMPERATURE: 98.4 F

## 2024-06-14 LAB
ANION GAP SERPL CALCULATED.3IONS-SCNC: 12.4 MMOL/L (ref 5–15)
BUN SERPL-MCNC: 7 MG/DL (ref 6–20)
BUN/CREAT SERPL: 7.5 (ref 7–25)
CALCIUM SPEC-SCNC: 7.5 MG/DL (ref 8.6–10.5)
CHLORIDE SERPL-SCNC: 103 MMOL/L (ref 98–107)
CO2 SERPL-SCNC: 22.6 MMOL/L (ref 22–29)
CREAT SERPL-MCNC: 0.93 MG/DL (ref 0.57–1)
EGFRCR SERPLBLD CKD-EPI 2021: 71.8 ML/MIN/1.73
GLUCOSE SERPL-MCNC: 96 MG/DL (ref 65–99)
HBA1C MFR BLD: 4.4 % (ref 4.8–5.6)
MAGNESIUM SERPL-MCNC: 1.5 MG/DL (ref 1.6–2.6)
POTASSIUM SERPL-SCNC: 3.5 MMOL/L (ref 3.5–5.2)
SODIUM SERPL-SCNC: 138 MMOL/L (ref 136–145)

## 2024-06-14 PROCEDURE — 97535 SELF CARE MNGMENT TRAINING: CPT

## 2024-06-14 PROCEDURE — 83735 ASSAY OF MAGNESIUM: CPT | Performed by: FAMILY MEDICINE

## 2024-06-14 PROCEDURE — 94618 PULMONARY STRESS TESTING: CPT

## 2024-06-14 PROCEDURE — 94799 UNLISTED PULMONARY SVC/PX: CPT

## 2024-06-14 PROCEDURE — 80048 BASIC METABOLIC PNL TOTAL CA: CPT | Performed by: FAMILY MEDICINE

## 2024-06-14 PROCEDURE — 97116 GAIT TRAINING THERAPY: CPT

## 2024-06-14 PROCEDURE — 94664 DEMO&/EVAL PT USE INHALER: CPT

## 2024-06-14 PROCEDURE — 99238 HOSP IP/OBS DSCHRG MGMT 30/<: CPT | Performed by: FAMILY MEDICINE

## 2024-06-14 PROCEDURE — 25010000002 MAGNESIUM SULFATE 2 GM/50ML SOLUTION: Performed by: FAMILY MEDICINE

## 2024-06-14 PROCEDURE — 83036 HEMOGLOBIN GLYCOSYLATED A1C: CPT | Performed by: FAMILY MEDICINE

## 2024-06-14 PROCEDURE — 25010000002 FUROSEMIDE PER 20 MG: Performed by: FAMILY MEDICINE

## 2024-06-14 PROCEDURE — 97530 THERAPEUTIC ACTIVITIES: CPT

## 2024-06-14 PROCEDURE — 94660 CPAP INITIATION&MGMT: CPT

## 2024-06-14 RX ORDER — VANCOMYCIN HYDROCHLORIDE 125 MG/1
125 CAPSULE ORAL EVERY 6 HOURS SCHEDULED
Qty: 32 CAPSULE | Refills: 0 | Status: SHIPPED | OUTPATIENT
Start: 2024-06-14 | End: 2024-06-22

## 2024-06-14 RX ORDER — LACTOBACILLUS RHAMNOSUS GG 10B CELL
1 CAPSULE ORAL 2 TIMES DAILY
Qty: 30 CAPSULE | Refills: 0 | Status: SHIPPED | OUTPATIENT
Start: 2024-06-14 | End: 2024-06-29

## 2024-06-14 RX ORDER — SPIRONOLACTONE 50 MG/1
50 TABLET, FILM COATED ORAL DAILY
Qty: 10 TABLET | Refills: 0 | Status: SHIPPED | OUTPATIENT
Start: 2024-06-15 | End: 2024-06-25

## 2024-06-14 RX ORDER — MAGNESIUM SULFATE HEPTAHYDRATE 40 MG/ML
2 INJECTION, SOLUTION INTRAVENOUS
Status: DISPENSED | OUTPATIENT
Start: 2024-06-14 | End: 2024-06-14

## 2024-06-14 RX ORDER — MAGNESIUM SULFATE HEPTAHYDRATE 40 MG/ML
2 INJECTION, SOLUTION INTRAVENOUS ONCE
Status: COMPLETED | OUTPATIENT
Start: 2024-06-14 | End: 2024-06-14

## 2024-06-14 RX ORDER — SPIRONOLACTONE 50 MG/1
50 TABLET, FILM COATED ORAL DAILY
Qty: 30 TABLET | Refills: 0 | Status: SHIPPED | OUTPATIENT
Start: 2024-06-15 | End: 2024-06-14

## 2024-06-14 RX ORDER — L.ACID,PARA/B.BIFIDUM/S.THERM 8B CELL
1 CAPSULE ORAL 2 TIMES DAILY
Status: DISCONTINUED | OUTPATIENT
Start: 2024-06-14 | End: 2024-06-14 | Stop reason: HOSPADM

## 2024-06-14 RX ORDER — MAGNESIUM OXIDE 400 MG/1
400 TABLET ORAL DAILY
Qty: 10 TABLET | Refills: 0 | Status: SHIPPED | OUTPATIENT
Start: 2024-06-14 | End: 2024-06-24

## 2024-06-14 RX ORDER — POTASSIUM CHLORIDE 20 MEQ/1
40 TABLET, EXTENDED RELEASE ORAL EVERY 4 HOURS
Status: COMPLETED | OUTPATIENT
Start: 2024-06-14 | End: 2024-06-14

## 2024-06-14 RX ADMIN — FUROSEMIDE 20 MG: 10 INJECTION, SOLUTION INTRAMUSCULAR; INTRAVENOUS at 09:04

## 2024-06-14 RX ADMIN — ACETAMINOPHEN 650 MG: 325 TABLET, FILM COATED ORAL at 02:46

## 2024-06-14 RX ADMIN — POTASSIUM CHLORIDE 40 MEQ: 1500 TABLET, EXTENDED RELEASE ORAL at 09:02

## 2024-06-14 RX ADMIN — VANCOMYCIN HYDROCHLORIDE 125 MG: 125 CAPSULE ORAL at 12:12

## 2024-06-14 RX ADMIN — VANCOMYCIN HYDROCHLORIDE 125 MG: 125 CAPSULE ORAL at 05:27

## 2024-06-14 RX ADMIN — FLUTICASONE PROPIONATE 2 SPRAY: 50 SPRAY, METERED NASAL at 09:05

## 2024-06-14 RX ADMIN — VENLAFAXINE HYDROCHLORIDE 150 MG: 150 CAPSULE, EXTENDED RELEASE ORAL at 09:02

## 2024-06-14 RX ADMIN — POTASSIUM CHLORIDE 40 MEQ: 1500 TABLET, EXTENDED RELEASE ORAL at 14:46

## 2024-06-14 RX ADMIN — MAGNESIUM SULFATE HEPTAHYDRATE 2 G: 40 INJECTION, SOLUTION INTRAVENOUS at 09:04

## 2024-06-14 RX ADMIN — MAGNESIUM SULFATE HEPTAHYDRATE 2 G: 40 INJECTION, SOLUTION INTRAVENOUS at 12:12

## 2024-06-14 RX ADMIN — THIAMINE HCL TAB 100 MG 100 MG: 100 TAB at 09:02

## 2024-06-14 RX ADMIN — FOLIC ACID 1 MG: 1 TABLET ORAL at 09:02

## 2024-06-14 RX ADMIN — SPIRONOLACTONE 50 MG: 25 TABLET, FILM COATED ORAL at 09:02

## 2024-06-14 RX ADMIN — Medication 10 ML: at 09:05

## 2024-06-14 RX ADMIN — ATORVASTATIN CALCIUM 10 MG: 10 TABLET, FILM COATED ORAL at 09:02

## 2024-06-14 RX ADMIN — BUDESONIDE 0.5 MG: 0.5 INHALANT RESPIRATORY (INHALATION) at 07:09

## 2024-06-14 RX ADMIN — MAGNESIUM SULFATE HEPTAHYDRATE 2 G: 40 INJECTION, SOLUTION INTRAVENOUS at 14:46

## 2024-06-14 NOTE — PLAN OF CARE
Goal Outcome Evaluation:              Outcome Evaluation: VSS on 3L NC. No complaints of pain, discomfort, or shortness of breath this shift. Patient's stool is more solid, smaller and less frequent compared to previous days.

## 2024-06-14 NOTE — PLAN OF CARE
Goal Outcome Evaluation:  Plan of Care Reviewed With: patient        Progress: improving  Outcome Evaluation: Pt participated in PT tx. Pt in much better spirits this date. Pt transferred to standing with SBA. Pt used Rwx to ambulated 390ft with SBA. PT would benefit from having a rollator and BSC at home.      Anticipated Discharge Disposition (PT): home with assist, home with home health

## 2024-06-14 NOTE — OUTREACH NOTE
Prep Survey      Flowsheet Row Responses   [facility] facility patient discharged from? [facility]   Is LACE score < 7 ? No   Eligibility TCM   Discharge diagnosis Anasarca with hypoalbuminemia, suspected liver disease.   Does the patient have one of the following disease processes/diagnoses(primary or secondary)? Other   Does the patient have Home health ordered? No   Is there a DME ordered? Yes   What DME was ordered? [employer] TELL[employer]   Bedside Commode Rolator   Prep survey completed? Yes            MALIKA MONTOYA - Registered Nurse

## 2024-06-14 NOTE — THERAPY TREATMENT NOTE
Patient Name: Nicole Silva  : 1965    MRN: [id number]                              Today's Date: 2024       Admit Date: 6/10/2024    Visit Dx:     ICD-10-CM ICD-9-CM   1. Hypokalemia  E87.6 276.8   2. Hypomagnesemia  E83.42 275.2   3. Anasarca  R60.1 782.3     Patient Active Problem List   Diagnosis    Cellulitis of left foot    Lactic acidosis    Hypokalemia    Hypomagnesemia    Anasarca    Hypoalbuminemia     Past Medical History:   Diagnosis Date    COPD (chronic obstructive pulmonary disease)     Hypertension      Past Surgical History:   Procedure Laterality Date     SECTION  ,      General Information       Row Name 24 1324          OT Time and Intention    Document Type therapy note (daily note)  -DB     Mode of Treatment occupational therapy  -DB               User Key  (r) = Recorded By, (t) = Taken By, (c) = Cosigned By      Initials Name Provider Type    DB Herson Cooley OT Occupational Therapist                     Mobility/ADL's       Row Name 24 1324          Bed Mobility    Supine-Sit Porter (Bed Mobility) standby assist  -DB     Assistive Device (Bed Mobility) head of bed elevated;bed rails  -DB       Row Name 24 1324          Transfers    Transfers sit-stand transfer  -DB       Row Name 24 1324          Sit-Stand Transfer    Sit-Stand Porter (Transfers) standby assist  -DB     Assistive Device (Sit-Stand Transfers) walker, front-wheeled  -DB       Row Name 24 1324          Toilet Transfer    Type (Toilet Transfer) sit-stand;stand-sit  -DB     [place] Level (Toilet Transfer) standby assist  -DB     Assistive Device (Toilet Transfer) commode  -DB       Row Name 24 1324          Functional Mobility    Functional Mobility- Ind. Level standby assist  -DB     Functional Mobility- Device walker, front-wheeled  -DB     Functional Mobility-Distance (Feet) 390  -DB     Patient was able to Ambulate yes  -DB       Row  Name 06/14/24 1324          Activities of Daily Living    BADL Assessment/Intervention toileting  -DB       Row Name 06/14/24 1324          Toileting Assessment/Training    [place] Level (Toileting) toileting skills;standby assist  -DB               User Key  (r) = Recorded By, (t) = Taken By, (c) = Cosigned By      Initials Name Provider Type    DB Herson Cooley, OT Occupational Therapist                   Obj/Interventions    No documentation.                  Goals/Plan    No documentation.                  Clinical Impression       Row Name 06/14/24 1325          Pain Assessment    Pretreatment Pain Rating 0/10 - no pain  -DB     Posttreatment Pain Rating 0/10 - no pain  -DB       Row Name 06/14/24 1325          Plan of Care Review    Plan of Care Reviewed With patient  -DB     Progress improving  -DB     Outcome Evaluation Pt sitting EOB upon OT arrival. Pt agreeable to tx. Pt able to complete STS wtih SBA. Pt ambulated into bathroom to complete toilet transfer with SBA. Pt able to complete toileting tasks with SBA. Pt able to stand at sink level to perform hand washing routine with SBA and RW. Pt able to complete fxl mobility x390ft with RW and SBA. Pt returned to chair in room with SBA and RW. Pt left seated in chair with needs in reach. OT to continue per POC.  -DB       Row Name 06/14/24 1325          Vital Signs    Pre SpO2 (%) 97  -DB     Intra SpO2 (%) 95  -DB     O2 Delivery Intra Treatment room air  -DB     Post SpO2 (%) 95  -DB     O2 Delivery Post Treatment room air  -DB     Pre Patient Position Supine  -DB     Intra Patient Position Standing  -DB     Post Patient Position Sitting  -DB       Row Name 06/14/24 1325          Positioning and Restraints    Pre-Treatment Position in bed  -DB     Post Treatment Position chair  -DB     In Chair sitting;call light within reach;encouraged to call for assist  -DB               User Key  (r) = Recorded By, (t) = Taken By, (c) = Cosigned By       Initials Name Provider Type    Herson Obregon OT Occupational Therapist                   Outcome Measures       Row Name 06/14/24 1328          How much help from another is currently needed...    Putting on and taking off regular lower body clothing? 3  -DB     Bathing (including washing, rinsing, and drying) 3  -DB     Toileting (which includes using toilet bed pan or urinal) 3  -DB     Putting on and taking off regular upper body clothing 4  -DB     Taking care of personal grooming (such as brushing teeth) 4  -DB     Eating meals 4  -DB     AM-PAC 6 Clicks Score (OT) 21  -DB       Row Name 06/14/24 1248 06/14/24 0800       How much help from another person do you currently need...    Turning from your back to your side while in flat bed without using bedrails? 4  -MS 4  -RS    Moving from lying on back to sitting on the side of a flat bed without bedrails? 4  -MS 4  -RS    Moving to and from a bed to a chair (including a wheelchair)? 4  -MS 3  -RS    Standing up from a chair using your arms (e.g., wheelchair, bedside chair)? 4  -MS 3  -RS    Climbing 3-5 steps with a railing? 4  -MS 3  -RS    To walk in hospital room? 4  -MS 3  -RS    AM-PAC 6 Clicks Score (PT) 24  -MS 20  -RS    Highest Level of Mobility Goal 8 --> Walked 250 feet or more  -MS 6 --> Walk 10 steps or more  -RS      Row Name 06/14/24 1328          Functional Assessment    Outcome Measure Options AM-PAC 6 Clicks Daily Activity (OT)  -DB               User Key  (r) = Recorded By, (t) = Taken By, (c) = Cosigned By      Initials Name Provider Type    Dangelo Diana, PT Physical Therapist    RS Skip Browne, RN Registered Nurse    Herson Obregon OT Occupational Therapist                    Occupational Therapy Education       Title: PT OT SLP Therapies (In Progress)       Topic: Occupational Therapy (In Progress)       Point: ADL training (Done)       Description:   Instruct learner(s) on proper safety adaptation and  remediation techniques during self care or transfers.   Instruct in proper use of assistive devices.                  Learning Progress Summary             Patient Acceptance, E,TB, VU by DB at 6/14/2024 1328    Comment: Pt educated on ADL retraining. Pt receptive this date.    Acceptance, E,TB, VU by DB at 6/13/2024 1321    Comment: Pt educated on ADL retraining for LBD. Pt receptive this date.    Acceptance, E,TB, VU by SD at 6/11/2024 1647    Comment: OT POC                         Point: Home exercise program (Not Started)       Description:   Instruct learner(s) on appropriate technique for monitoring, assisting and/or progressing therapeutic exercises/activities.                  Learner Progress:  Not documented in this visit.              Point: Precautions (Not Started)       Description:   Instruct learner(s) on prescribed precautions during self-care and functional transfers.                  Learner Progress:  Not documented in this visit.              Point: Body mechanics (Not Started)       Description:   Instruct learner(s) on proper positioning and spine alignment during self-care, functional mobility activities and/or exercises.                  Learner Progress:  Not documented in this visit.                              User Key       Initials Effective Dates Name Provider Type Discipline    SD 06/16/21 -  Rubia Mims OT Occupational Therapist OT    DB 07/06/23 -  Herson Cooley OT Occupational Therapist OT                  OT Recommendation and Plan     Plan of Care Review  Plan of Care Reviewed With: patient  Progress: improving  Outcome Evaluation: Pt sitting EOB upon OT arrival. Pt agreeable to tx. Pt able to complete STS wtih SBA. Pt ambulated into bathroom to complete toilet transfer with SBA. Pt able to complete toileting tasks with SBA. Pt able to stand at sink level to perform hand washing routine with SBA and RW. Pt able to complete fxl mobility x390ft with RW and SBA. Pt  returned to chair in room with SBA and RW. Pt left seated in chair with needs in reach. OT to continue per POC.     Time Calculation:         Time Calculation- OT       Row Name 06/14/24 1329 06/14/24 1250          Time Calculation- OT    OT Start Time 1007  -DB --     OT Stop Time 1038  -DB --     OT Time Calculation (min) 31 min  -DB --     OT Received On 06/14/24  -DB --     OT Goal Re-Cert Due Date 06/21/24  -DB --        Timed Charges    06109 - Gait Training Minutes  -- 12  -MS     73946 - OT Therapeutic Activity Minutes 16  -DB --     04902 - OT Self Care/Mgmt Minutes 15  -DB --        Total Minutes    Timed Charges Total Minutes 31  -DB 12  -MS      Total Minutes 31  -DB 12  -MS               User Key  (r) = Recorded By, (t) = Taken By, (c) = Cosigned By      Initials Name Provider Type    MS Dangelo Teixeira, PT Physical Therapist    DB Herson Cooley OT Occupational Therapist                  Therapy Charges for Today       Code Description Service Date Service Provider Modifiers Qty    10729173690 HC OT THERAPEUTIC ACT EA 15 MIN 6/13/2024 Herson Cooley, OT GO 2    68845678545 HC OT THERAPEUTIC ACT EA 15 MIN 6/14/2024 Herson Cooley OT GO 1    02238343639 HC OT SELF CARE/MGMT/TRAIN EA 15 MIN 6/14/2024 Herson Cooley OT GO 1                 Herson Cooley OT  6/14/2024

## 2024-06-14 NOTE — THERAPY TREATMENT NOTE
Patient Name: Nicole Silva  : 1965    MRN: [id number]                              Today's Date: 2024       Admit Date: 6/10/2024    Visit Dx:     ICD-10-CM ICD-9-CM   1. Hypokalemia  E87.6 276.8   2. Hypomagnesemia  E83.42 275.2   3. Anasarca  R60.1 782.3     Patient Active Problem List   Diagnosis    Cellulitis of left foot    Lactic acidosis    Hypokalemia    Hypomagnesemia    Anasarca    Hypoalbuminemia     Past Medical History:   Diagnosis Date    COPD (chronic obstructive pulmonary disease)     Hypertension      Past Surgical History:   Procedure Laterality Date     SECTION  ,      General Information       Row Name 24 1242          Physical Therapy Time and Intention    Document Type therapy note (daily note)  -MS     Mode of Treatment physical therapy  -MS       Row Name 24 1242          General Information    Patient Profile Reviewed yes  -MS               User Key  (r) = Recorded By, (t) = Taken By, (c) = Cosigned By      Initials Name Provider Type    MS Dangelo Teixeira, PT Physical Therapist                   Mobility       Row Name 24 1242          Bed Mobility    Supine-Sit [place] (Bed Mobility) standby assist  -MS     Sit-Supine [place]ane (Bed Mobility) standby assist  -MS     Assistive Device (Bed Mobility) head of bed elevated;bed rails  -MS       Row Name 24 1242          Sit-Stand Transfer    Sit-Stand [place] (Transfers) standby assist  -MS     Assistive Device (Sit-Stand Transfers) walker, front-wheeled  -MS       Row Name 24 1242          Gait/Stairs (Locomotion)    [place] Level (Gait) standby assist  -MS     Assistive Device (Gait) walker, front-wheeled  -MS     Patient was able to Ambulate yes  -MS     Distance in Feet (Gait) 390  -MS     Deviations/Abnormal Patterns (Gait) festinating/shuffling  -MS     Bilateral Gait Deviations forward flexed posture  -MS               User Key  (r) = Recorded By, (t) = Taken  By, (c) = Cosigned By      Initials Name Provider Type    Dangelo Diana, JERRELL Physical Therapist                   Obj/Interventions    No documentation.                  Goals/Plan    No documentation.                  Clinical Impression       Row Name 06/14/24 1245          Pain    Pretreatment Pain Rating 0/10 - no pain  -MS     Posttreatment Pain Rating 0/10 - no pain  -MS       Row Name 06/14/24 1245          Plan of Care Review    Plan of Care Reviewed With patient  -MS     Progress improving  -MS     Outcome Evaluation Pt participated in PT tx. Pt in much better spirits this date. Pt transferred to standing with SBA. Pt used Rwx to ambulated 390ft with SBA. PT would benefit from having a rollator and BSC at home.  -MS       Row Name 06/14/24 1245          Vital Signs    O2 Delivery Pre Treatment room air  -MS     O2 Delivery Intra Treatment room air  -MS     O2 Delivery Post Treatment room air  -MS     Pre Patient Position Supine  -MS     Intra Patient Position Standing  -MS     Post Patient Position Supine  -MS       Row Name 06/14/24 1245          Positioning and Restraints    Pre-Treatment Position in bed  -MS     Post Treatment Position chair  -MS     In Chair sitting;call light within reach;encouraged to call for assist  -MS               User Key  (r) = Recorded By, (t) = Taken By, (c) = Cosigned By      Initials Name Provider Type    Dangelo Diana, JERRELL Physical Therapist                   Outcome Measures       Row Name 06/14/24 1248 06/14/24 0800       How much help from another person do you currently need...    Turning from your back to your side while in flat bed without using bedrails? 4  -MS 4  -RS    Moving from lying on back to sitting on the side of a flat bed without bedrails? 4  -MS 4  -RS    Moving to and from a bed to a chair (including a wheelchair)? 4  -MS 3  -RS    Standing up from a chair using your arms (e.g., wheelchair, bedside chair)? 4  -MS 3  -RS    Climbing 3-5 steps  with a railing? 4  -MS 3  -RS    To walk in hospital room? 4  -MS 3  -RS    AM-PAC 6 Clicks Score (PT) 24  -MS 20  -RS    Highest Level of Mobility Goal 8 --> Walked 250 feet or more  -MS 6 --> Walk 10 steps or more  -RS              User Key  (r) = Recorded By, (t) = Taken By, (c) = Cosigned By      Initials Name Provider Type    MS Dangelo Teixeira PT Physical Therapist    RS Skip Browne, RN Registered Nurse                                 Physical Therapy Education       Title: PT OT SLP Therapies (In Progress)       Topic: Physical Therapy (In Progress)       Point: Mobility training (Done)       Learning Progress Summary             Patient Acceptance, E, VU by MS at 6/14/2024 1248    Comment: importance of mobility    Acceptance, E, VU by MS at 6/13/2024 1350    Comment: importance of mobility                         Point: Home exercise program (Done)       Learning Progress Summary             Patient Acceptance, E, VU by MS at 6/14/2024 1248    Comment: importance of mobility    Acceptance, E, VU by MS at 6/13/2024 1350    Comment: importance of mobility                         Point: Body mechanics (Not Started)       Learner Progress:  Not documented in this visit.              Point: Precautions (Not Started)       Learner Progress:  Not documented in this visit.                              User Key       Initials Effective Dates Name Provider Type Discipline    MS 08/22/23 -  Dangelo Teixeira PT Physical Therapist PT                  PT Recommendation and Plan  Planned Therapy Interventions (PT): balance training, bed mobility training, gait training, home exercise program, stair training, ROM (range of motion), neuromuscular re-education, strengthening, patient/family education, transfer training  Plan of Care Reviewed With: patient  Progress: improving  Outcome Evaluation: Pt participated in PT tx. Pt in much better spirits this date. Pt transferred to standing with SBA. Pt used Rwx to ambulated  390ft with SBA. PT would benefit from having a rollator and BSC at home.     Time Calculation:   PT Evaluation Complexity  History, PT Evaluation Complexity: 1-2 personal factors and/or comorbidities  Examination of Body Systems (PT Eval Complexity): total of 3 or more elements  Clinical Presentation (PT Evaluation Complexity): evolving  Clinical Decision Making (PT Evaluation Complexity): moderate complexity  Overall Complexity (PT Evaluation Complexity): moderate complexity     PT Charges       Row Name 06/14/24 1250             Time Calculation    Start Time 1006  -MS      Stop Time 1030  -MS      Time Calculation (min) 24 min  -MS      PT Received On 06/14/24  -MS         Timed Charges    10928 - Gait Training Minutes  12  -MS      82623 - PT Therapeutic Activity Minutes 12  -MS         Total Minutes    Timed Charges Total Minutes 24  -MS       Total Minutes 24  -MS                User Key  (r) = Recorded By, (t) = Taken By, (c) = Cosigned By      Initials Name Provider Type    Dangelo Diana, PT Physical Therapist                  Therapy Charges for Today       Code Description Service Date Service Provider Modifiers Qty    57716387303 HC PT EVAL MOD COMPLEXITY 4 6/13/2024 Dangelo Teixeira, PT GP 1    33676393012 HC GAIT TRAINING EA 15 MIN 6/14/2024 Dangelo Teixeira, PT GP 1    68089116642 HC PT THERAPEUTIC ACT EA 15 MIN 6/14/2024 Dangelo Teixeira, PT GP 1            PT G-Codes  Outcome Measure Options: AM-PAC 6 Clicks Daily Activity (OT)  AM-PAC 6 Clicks Score (PT): 24  AM-PAC 6 Clicks Score (OT): 21  PT Discharge Summary  Anticipated Discharge Disposition (PT): home with assist, home with home health    Dangelo Teixeira PT  6/14/2024

## 2024-06-14 NOTE — PLAN OF CARE
Goal Outcome Evaluation:  Plan of Care Reviewed With: patient        Progress: improving  Outcome Evaluation: Pt sitting EOB upon OT arrival. Pt agreeable to tx. Pt able to complete STS wtih SBA. Pt ambulated into bathroom to complete toilet transfer with SBA. Pt able to complete toileting tasks with SBA. Pt able to stand at sink level to perform hand washing routine with SBA and RW. Pt able to complete fxl mobility x390ft with RW and SBA. Pt returned to chair in room with SBA and RW. Pt left seated in chair with needs in reach. OT to continue per POC.

## 2024-06-14 NOTE — CASE MANAGEMENT/SOCIAL WORK
Continued Stay Note   Ramírez     Patient Name: Nicole Silva  MRN: [id number]  Today's Date: 6/14/2024    Admit Date: 6/10/2024    Plan: per rn pt able to wean from 12L back down to 3L today; after lasix; cm will continue to follow pt for cm needs   Discharge Plan       Row Name 06/14/24 1617       Plan    Plan Comments Bedside commode [facility] pt [facility]   1630 per Jihan rueda [facility] they will deliver Rolator to the room                    Discharge Codes    No documentation.                 Expected Discharge Date and Time       Expected Discharge Date Expected Discharge Time    Jun 14, 2024               Jovanna White RN

## 2024-06-14 NOTE — CASE MANAGEMENT/SOCIAL WORK
Case Management Discharge Note      Final Note: Plans to DC home with son    Provided Post Acute Provider List?: N/A  N/A Provider List Comment: Patient plans to return home; requests BSC, no DME company preference  Provided Post Acute Provider Quality & Resource List?: N/A  N/A Quality & Resource List Comment: Patient plans to return home; requests BSC, no DME company preference    Selected Continued Care - Admitted Since 6/10/2024       Destination    No services have been selected for the patient.                Durable Medical Equipment       Service Provider Selected Services Address Phone Fax Patient Preferred    [facility] [address] [place] [address] [phone] --       Internal Comment last updated by Jovanna White, RN 6/14/2024 1617    Bedside Commode Rolator                          Dialysis/Infusion    No services have been selected for the patient.                Home Medical Care    No services have been selected for the patient.                Therapy    No services have been selected for the patient.                Community Resources    No services have been selected for the patient.                Community & DME    No services have been selected for the patient.                    Transportation Services  Private: Car    Final Discharge Disposition Code: 01 - home or self-care

## 2024-06-14 NOTE — DISCHARGE INSTRUCTIONS
- Continue antibiotics and probiotics as prescribed until finished.  -Take magnesium supplements as prescribed.  -Take spironolactone as prescribed.  - follow-up with gastroenterology.  -Follow-up with your PCP in 2 to 3 days for recheck and continued care.

## 2024-06-14 NOTE — PROGRESS NOTES
Exercise Oximetry    Patient Name:Nicole Silva   MRN: [id number]   Date: 06/14/24             ROOM AIR BASELINE   SpO2% 97   Heart Rate 95   Blood Pressure      EXERCISE ON ROOM AIR SpO2% EXERCISE ON O2 @  LPM SpO2%   1 MINUTE 97 1 MINUTE    2 MINUTES 96 2 MINUTES    3 MINUTES 95 3 MINUTES    4 MINUTES 95 4 MINUTES    5 MINUTES 95 5 MINUTES    6 MINUTES 93 6 MINUTES               Distance Walked 100ft Distance Walked   Dyspnea (Chao Scale) 2 Dyspnea (Chao Scale)   Fatigue (Chao Scale)  0 Fatigue (Chao Scale)   SpO2% Post Exercise  93 SpO2% Post Exercise   HR Post Exercise  113 HR Post Exercise   Time to Recovery  1 Time to Recovery     Comments: pt does not require O2 on exertion.

## 2024-06-14 NOTE — DISCHARGE SUMMARY
[facility]   DISCHARGE SUMMARY      Name:  Nicole Silva   Age:  58 y.o.  Sex:  female  :  1965  MRN:  [id number]   Visit Number:  [id number]    Admission Date:  6/10/2024  Date of Discharge:  2024  Primary Care Physician:  Aleksandra Andres APRN    Important issues to note:    -Discharged on p.o. vancomycin  -Prescribed magnesium supplements and spironolactone, needs repeat labs with PCP to monitor electrolytes and kidney function.  -Follow-up with gastroenterology  -Follow-up with PCP    Discharge Diagnoses:     Anasarca with hypoalbuminemia, suspected liver disease.  C. difficile colitis  Suspected alcoholic versus fatty liver disease.  Hypokalemia and hypomagnesemia, POA.  COPD, no exacerbation.  Essential hypertension.  History of alcohol abuse.  Recent history of left foot laceration, healing.    Problem List:     Active Hospital Problems    Diagnosis  POA    **Anasarca [R60.1]  Yes    Hypoalbuminemia [E88.09]  Unknown    Hypokalemia [E87.6]  Yes    Hypomagnesemia [E83.42]  Yes      Resolved Hospital Problems   No resolved problems to display.     Presenting Problem:    Chief Complaint   Patient presents with    Edema      Consults:     Consulting Physician(s)         Provider   Role Specialty     Allen Santos MD      Consulting Physician Gastroenterology          Procedures Performed:        History of presenting illness/Hospital Course:    58-year-old with a history of hypertension, COPD, recent diagnosis of a left foot laceration cellulitis, she is also diagnosed with norovirus recently.  She had actually been treated at this facility for the norovirus and dehydration.  She also had a prior history of alcohol use.     Workup in the emergency room have demonstrated overall stable vital signs.  She did have elevated proBNP, creatinine was 0.7.  White count was 14 hemoglobin is 9.2.  CT imaging was concerning for fluid overload, there was also notable  hypomagnesemia and hypokalemia.  She was admitted to the hospital service     Patient was initially started on diuretic therapy.  GI consult made.  She also had positive C. difficile testing has been started on oral vancomycin.  She did unfortunately develop evidence of likely volume overload on evening of 6/12/2024, however it improved with diuretic therapy and breathing treatments.    Anasarca/hypoalbuminemia.  C. difficile  -CT imaging did show some minimal amount of ascites, there was third spacing noted.    -There is hepatomegaly. She also has ongoing colitis, she did have recent norovirus 2-1/2 weeks ago still with intermittent diarrhea.    -Did consult with GI, appreciate his recommendations.  -Patient did have subsequent C. difficile positive testing has been started on oral vancomycin on 6/12/2024.  -Patient discharged on p.o. vancomycin and lactobacillus  -Patient follow-up with GI as an outpatient    Hypomagnesemia/hypokalemia.  -Replacement as tolerated.    -Magnesium supplements ordered on discharge  -Continue spironolactone  -Needs labs repeated with PCP for monitoring    Respiratory failure, resolved  -I suspect related to volume overload, poss related to albumin infusion.    -She has diuresed well with Lasix and will continue her spironolactone.    -Patient was started down on her oxygen requirements and was saturating well above 90% on room air, was not requiring supplemental oxygen on discharge.  -Continued on spironolactone  -Follow-up with PCP    Debility  - Patient has a mobility limitation that significantly impairs their ability to participate in one or more mobility-related activities of daily living.  The patient is able to safely use the walker.  The functional mobility deficit can be sufficiently resolved by use of a walker.  -The patient is physically incapable of utilizing regular toilet facilities. Use of a bedside commode is necessary as they are confined to one level of home  with no  toileting facilities available on that level.     Patient was seen and examined on the day of discharge.  Patient sitting up in the chair and appears very comfortable with no distress.  Patient reports feeling significantly better today and is eager to go home.  Patient reports her diarrhea resolved, she denies any abdominal pain or discomfort.  She is tolerating p.o. well with no nausea or vomiting.  Patient with no other acute complaints.  She is ambulatory with no difficulty.  Declined home health and reports that she has her son at home who will help her.  Patient titrated down on her oxygen and was saturating well above 90% on room air. Patient instructed on management and plan in details.  Prescribed p.o. Vanco on discharge.  Will continue spironolactone and magnesium supplements.  Patient will need labs repeated with her PCP for monitoring. Patient to follow-up with PCP in 2 to 3 days for recheck and continued care. Clear return precautions discussed. Patient verbalized understanding and agreeable to plan.  All questions were answered to the patient's satisfaction. Patient has reached maximum medical improvement of inpatient hospital stay. Patient is discharged in stable condition.    Vital Signs:    Temp:  [98.2 °F (36.8 °C)-98.4 °F (36.9 °C)] 98.4 °F (36.9 °C)  Heart Rate:  [] 100  Resp:  [18-20] 18  BP: ()/(61-81) 108/73    Physical Exam:    General Appearance:  Alert and cooperative.  No acute distress.   Head:  Atraumatic and normocephalic.   Eyes: Conjunctivae and sclerae normal, no icterus. No pallor.   Ears:  Ears with no abnormalities noted.   Throat: No oral lesions, no thrush, oral mucosa moist.   Neck: Supple, trachea midline, no thyromegaly.   Back:   No kyphoscoliosis present. No tenderness to palpation.   Lungs:   Breath sounds heard bilaterally equally.  No crackles or wheezing. No Pleural rub or bronchial breathing.  Unlabored.  On room air.   Heart:  Normal S1 and S2, no murmur,  no gallop, no rub. No JVD.   Abdomen:   Normal bowel sounds, no masses, no organomegaly. Soft, nontender, nondistended, no rebound tenderness.   Extremities: Supple, no edema, no cyanosis, no clubbing.   Pulses: Pulses palpable bilaterally.   Skin: No bleeding or rash.   Neurologic: Alert and oriented x 3. No facial asymmetry. Moves all four limbs. No tremors.     Pertinent Lab Results:     Results from last 7 days   Lab Units 06/14/24  0632 06/13/24  0548 06/12/24  1624 06/12/24  0521 06/11/24  0646 06/10/24  2228   SODIUM mmol/L 138 136  --  136 137 136   POTASSIUM mmol/L 3.5 4.1 3.8 3.5 3.9 2.8*   CHLORIDE mmol/L 103 102  --  103 106 102   CO2 mmol/L 22.6 21.7*  --  20.6* 21.1* 20.1*   BUN mg/dL 7 4*  --  4* 4* 4*   CREATININE mg/dL 0.93 0.72  --  0.70 0.62 0.74   CALCIUM mg/dL 7.5* 7.2*  --  7.0* 7.0* 7.1*   BILIRUBIN mg/dL  --  0.5  --   --  0.4 0.3   ALK PHOS U/L  --  160*  --   --  243* 237*   ALT (SGPT) U/L  --  10  --   --  9 9   AST (SGOT) U/L  --  23  --   --  46* 34*   GLUCOSE mg/dL 96 171*  --  118* 112* 129*     Results from last 7 days   Lab Units 06/13/24  0548 06/12/24  0521 06/11/24  0646 06/10/24  2228   WBC 10*3/mm3  --   --  12.97* 14.30*   HEMOGLOBIN g/dL 7.6* 7.7* 9.3* 9.2*   HEMATOCRIT % 24.1* 24.9* 28.6* 28.6*   PLATELETS 10*3/mm3  --   --  245 247     Results from last 7 days   Lab Units 06/11/24  0646   INR  1.05     Results from last 7 days   Lab Units 06/11/24  0024 06/10/24  2228   HSTROP T ng/L 19* 19*     Results from last 7 days   Lab Units 06/10/24  2228   PROBNP pg/mL 2,249.0*             Results from last 7 days   Lab Units 06/13/24  0526   PH, ARTERIAL pH units 7.383   PO2 ART mm Hg 93.6   PCO2, ARTERIAL mm Hg 35.2   HCO3 ART mmol/L 20.9*           Pertinent Radiology Results:    Imaging Results (All)       Procedure Component Value Units Date/Time    XR Chest 1 View [[id number]] Collected: 06/13/24 0720     Updated: 06/13/24 0724    Narrative:      PROCEDURE: XR CHEST 1 VW-      HISTORY: hypoxia f/u; E87.6-Hypokalemia; E83.42-Hypomagnesemia;  R60.1-Generalized edema     COMPARISON: June 12, 2024..     FINDINGS: The heart is normal in size. There is bibasilar airspace  disease, atelectasis versus infiltrate, worsened from prior exam. Small  right effusion again noted.. The mediastinum is unremarkable. There is  no pneumothorax.  There are no acute osseous abnormalities. Apical  lordotic positioning noted.        Impression:      Worsened bibasilar atelectasis or infiltrate, recommend  continued follow-up..              This report was signed and finalized on 6/13/2024 7:22 AM by Amna Segovia MD.       XR Chest 1 View [[id number]] Collected: 06/13/24 0023     Updated: 06/13/24 0025    Narrative:      FINAL REPORT    TECHNIQUE:  null    CLINICAL HISTORY:  hypoxia    COMPARISON:  null    FINDINGS:  Chest X-ray, 1 View    COMPARISON: None    FINDINGS:    Diffuse bilateral airspace disease with patchy areas of consolidation most pronounced in the right lung.    Blunting of the right costophrenic angle.    No pneumothorax.    No cardiomegaly.    No acute fracture.      Impression:      IMPRESSION:    Bilateral airspace disease with areas of consolidation, which could be due to pulmonary edema and/or pneumonia.    Right pleural effusion.    Authenticated and Electronically Signed by Aaron Vigil MD on  06/13/2024 12:23:23 AM    XR Chest 1 View [[id number]] Collected: 06/11/24 0822     Updated: 06/11/24 0824    Narrative:      PROCEDURE: XR CHEST 1 VW-     HISTORY: Anasarca     COMPARISON: None.     FINDINGS: The heart is borderline enlarged. The mediastinum is  unremarkable. The lungs are clear. There is no pneumothorax.  There are  no acute osseous abnormalities.       Impression:      No acute cardiopulmonary process.     Continued followup is recommended.                 Images were reviewed, interpreted, and dictated by Dr. Amna Segovia MD  Transcribed by Nicole Valdez PA-C.     This report  was signed and finalized on 6/11/2024 8:22 AM by Amna Segovia MD.       CT Abdomen Pelvis Without Contrast [[id number]] Collected: 06/11/24 0736     Updated: 06/11/24 0743    Narrative:      PROCEDURE: CT ABDOMEN PELVIS WO CONTRAST-     HISTORY: Anasarca and hypoalbuminemia.  Rule out liver cirrhosis.;  E87.6-Hypokalemia; E83.42-Hypomagnesemia; R60.1-Generalized edema     COMPARISON: None.     PROCEDURE: Axial images were obtained from the lung bases to the pubic  symphysis by computed tomography. This study was performed with  techniques to keep radiation doses as low as reasonably achievable,  (ALARA). Individualized dose reduction techniques using automated  exposure control or adjustment of mA and/or kV according to the patient  size were employed.     FINDINGS:     ABDOMEN: The lung bases are clear except for a 4 mm pleural-based nodule  lateral right lower lobe, no recommendation for follow-up per  Fleischner's criteria. There are a few linear densities in both lower  lobes, likely atelectasis. There is evidence of old calcified  granulomatous disease. No pericardial or pleural effusions identified.  The heart is proper size. The limited non-contrast images of the liver  demonstrate no focal abnormality. Liver is enlarged at 20 cm. No  nodularity of the contour is identified. Portal vein is within normal  limits in size at 14 mm. Gallbladder present with no CT visible stones.  The spleen is normal in size with no focal mass. Vascular calcifications  noted. Right adrenal gland is normal. There is mild fullness of the left  adrenal gland. Splenule is identified adjacent to the splenic hilum. The  aorta is normal in caliber. There is no significant free fluid or  adenopathy.  There is no nephrolithiasis. There is no hydronephrosis.  PELVIS: The GI tract demonstrate no obstruction. There is moderate wall  thickening of the ascending colon and proximal two-thirds of the  transverse colon suggesting colitis. No  diverticula identified. There is  mild infiltration of the surrounding fat at the hepatic flexure. Minimal  fluid is identified in both paracolic gutters and the pelvis. Uterus is  midline. There is a small exophytic nodule arising from the fundus,  likely an exophytic fibroid. The appendix is not identified. The urinary  bladder is almost completely collapsed. Small amount of high-attenuation  material identified in the cecum and distal descending colon, consider  minimal residual oral contrast. Diffuse subcutaneous edema identified.  There is no fluid or adenopathy.        Impression:      No hydronephrosis or nephrolithiasis.     Hepatomegaly with no other signs of cirrhosis, recommend correlation  with liver function tests.     Evidence of colitis involving the ascending and proximal two-thirds of  the transverse colon, consider infectious or inflammatory process with  ischemic colitis not excluded.     Minimal ascites with significant diffuse subcutaneous edema.     Suspected small uterine fibroid.     CTDI: 6.01 mGy  DLP:278.7 mGy.cm     This report was signed and finalized on 6/11/2024 7:41 AM by Amna Segovia MD.               Echo:    Results for orders placed during the hospital encounter of 06/10/24    Adult Transthoracic Echo Complete W/ Cont if Necessary Per Protocol    Interpretation Summary  1.  Normal left ventricular size and systolic function, 3D EF 69%.  2.  Grade 1 diastolic dysfunction.  3.  Normal right ventricular size and systolic function.  4.  Normal left atrial volume index.  5.  Mild mitral regurgitation.  6.  Mild tricuspid regurgitation.    Condition on Discharge:      Stable.    Code status during the hospital stay:    Code Status and Medical Interventions:   Ordered at: 06/11/24 0110     Code Status (Patient has no pulse and is not breathing):    CPR (Attempt to Resuscitate)     Medical Interventions (Patient has pulse or is breathing):    Full Support     Discharge  Disposition:    Home or Self Care    Discharge Medications:       Discharge Medications        New Medications        Instructions Start Date   lactobacillus acidophilus capsule capsule   1 capsule, Oral, 2 Times Daily      magnesium oxide 400 MG tablet  Commonly known as: MAG-OX   400 mg, Oral, Daily      spironolactone 50 MG tablet  Commonly known as: ALDACTONE   50 mg, Oral, Daily   Start Date: Ysabel 15, 2024     vancomycin 125 MG capsule  Commonly known as: VANCOCIN   125 mg, Oral, Every 6 Hours Scheduled             Continue These Medications        Instructions Start Date   albuterol sulfate  (90 Base) MCG/ACT inhaler  Commonly known as: PROVENTIL HFA;VENTOLIN HFA;PROAIR HFA   1 puff, Inhalation, Every 6 Hours PRN      atorvastatin 10 MG tablet  Commonly known as: LIPITOR   1 tablet, Oral, Daily      cetaphil lotion   Topical, Daily      collagenase 250 UNIT/GM ointment   1 Application, Topical, Every 24 Hours Scheduled      cyclobenzaprine 5 MG tablet  Commonly known as: FLEXERIL   5 mg, Oral, 3 Times Daily PRN      fluticasone 110 MCG/ACT inhaler  Commonly known as: FLOVENT HFA   Inhale 1 puff twice daily.  Rinse mouth well after using.      fluticasone 50 MCG/ACT nasal spray  Commonly known as: FLONASE   2 sprays, Nasal, Daily      folic acid 1 MG tablet  Commonly known as: FOLVITE   1 mg, Oral, Daily      High Pot Multivitamin/Beta-Car tablet tablet  Generic drug: multivitamin with minerals   1 tablet, Oral, Daily      levocetirizine 5 MG tablet  Commonly known as: XYZAL   5 mg, Oral, Every Evening      loperamide 2 MG capsule  Commonly known as: IMODIUM   2 mg, Oral, 4 Times Daily PRN      montelukast 10 MG tablet  Commonly known as: SINGULAIR   10 mg, Oral, Nightly      Thiamine Mononitrate 100 MG tablet   100 mg, Oral, Daily      venlafaxine XR 75 MG 24 hr capsule  Commonly known as: EFFEXOR-XR   150 mg, Oral, Daily      vitamin D 1.25 MG (81214 UT) capsule capsule  Commonly known as:  ERGOCALCIFEROL   Take 1 capsule by mouth Every 7 (Seven) Days.             Stop These Medications      progesterone 100 MG capsule  Commonly known as: PROMETRIUM     Progesterone 100 MG capsule  Commonly known as: PROMETRIUM            Discharge Diet:   Cardiac    Activity at Discharge:   As tolerated    Follow-up Appointments:     Follow-up Information       Aleksandra Andres APRN Follow up in 3 day(s).    Specialty: Family Medicine  Contact information:  [address] [place] [address]  [address]  [facility] [address]  [phone]               Allen Santos MD Follow up in 2 week(s).    Specialty: Gastroenterology  Contact information:  [address] [place] [address]  [address]  [facility] [address]  [phone]                           No future appointments.  Test Results Pending at Discharge:    Pending Labs       Order Current Status    Hemoglobin A1c In process               Santiago Mcmillan MD  06/14/24  15:30 EDT    Time: I spent 27 minutes on this discharge activity which included: face-to-face encounter with the patient, reviewing the data in the system, coordination of the care with the nursing staff as well as consultants, documentation, and entering orders.     Dictated utilizing Dragon dictation.

## 2024-06-17 ENCOUNTER — TRANSITIONAL CARE MANAGEMENT TELEPHONE ENCOUNTER (OUTPATIENT)
Dept: CALL CENTER | Facility: HOSPITAL | Age: 59
End: 2024-06-17
Payer: MEDICAID

## 2024-06-17 NOTE — OUTREACH NOTE
Call Center TCM Note      Flowsheet Row Responses   [facility] patient discharged from? [facility]   Does the patient have one of the following disease processes/diagnoses(primary or secondary)? Other   TCM attempt successful? No   Unsuccessful attempts Attempt 2   Call Status Voice mail issues            Amanda De La Cruz RN    6/17/2024, 15:12 EDT

## 2024-06-17 NOTE — PAYOR COMM NOTE
"To:  [employer]  From: Nidia Cuevas RN  Phone: [phone]  Fax: [phone]  NPI: [id number]  TIN: [id number]  Member ID: [id number]   MRN: [id number]    Robbie Silva (58 y.o. Female)       Date of Birth   1965    Social Security Number       Address   [address] [address]    Home Phone   [phone]    MRN   [id number]       [employer]   [employer]    Marital Status                               Admission Date   6/10/24    Admission Type   Emergency    Admitting Provider   Pat Alatorre DO    Attending Provider       Department, Room/Bed   [facility] TELEMETRY 3, 326/1       Discharge Date   2024    Discharge Disposition   Home or Self Care    Discharge Destination                                 Attending Provider: (none)   Allergies: No Known Allergies    Isolation: None   Infection: C.difficile (24)   Code Status: Prior    Ht: 162.6 cm (64.02\")   Wt: 65.8 kg (145 lb 1 oz)    Admission Cmt: None   Principal Problem: Anasarca [R60.1]                   Active Insurance as of 6/10/2024       Primary Coverage       Payor Plan Insurance Group Employer/Plan Group    [place] [id number]       Payor Plan Address Payor Plan Phone Number Payor Plan Fax Number Effective Dates    [employer] [address] [address] [phone]  2021 - None Entered    [facility] [address]         Subscriber Name Subscriber Birth Date Member ID       ROBBIE SILVA 1965 [id number]                     Emergency Contacts        (Rel.) Home Phone Work Phone Mobile Phone    aye nair (Daughter) [phone] -- --                 Discharge Summary        Santiago Mcmillan MD at 24 1530              [facility] HOSPITALIST   DISCHARGE SUMMARY      Name:  Robbie Silva   Age:  58 y.o.  Sex:  female  :  1965  MRN:  [id number]   Visit Number:  [id number]    Admission Date:  6/10/2024  Date of Discharge:  " 6/14/2024  Primary Care Physician:  Aleksandra Andres APRN    Important issues to note:    -Discharged on p.o. vancomycin  -Prescribed magnesium supplements and spironolactone, needs repeat labs with PCP to monitor electrolytes and kidney function.  -Follow-up with gastroenterology  -Follow-up with PCP    Discharge Diagnoses:     Anasarca with hypoalbuminemia, suspected liver disease.  C. difficile colitis  Suspected alcoholic versus fatty liver disease.  Hypokalemia and hypomagnesemia, POA.  COPD, no exacerbation.  Essential hypertension.  History of alcohol abuse.  Recent history of left foot laceration, healing.    Problem List:     Active Hospital Problems    Diagnosis  POA    **Anasarca [R60.1]  Yes    Hypoalbuminemia [E88.09]  Unknown    Hypokalemia [E87.6]  Yes    Hypomagnesemia [E83.42]  Yes      Resolved Hospital Problems   No resolved problems to display.     Presenting Problem:    Chief Complaint   Patient presents with    Edema      Consults:     Consulting Physician(s)         Provider   Role Specialty     Allen Santos MD      Consulting Physician Gastroenterology          Procedures Performed:        History of presenting illness/Hospital Course:    58-year-old with a history of hypertension, COPD, recent diagnosis of a left foot laceration cellulitis, she is also diagnosed with norovirus recently.  She had actually been treated at this facility for the norovirus and dehydration.  She also had a prior history of alcohol use.     Workup in the emergency room have demonstrated overall stable vital signs.  She did have elevated proBNP, creatinine was 0.7.  White count was 14 hemoglobin is 9.2.  CT imaging was concerning for fluid overload, there was also notable hypomagnesemia and hypokalemia.  She was admitted to the hospital service     Patient was initially started on diuretic therapy.  GI consult made.  She also had positive C. difficile testing has been started on oral vancomycin.  She did  unfortunately develop evidence of likely volume overload on evening of 6/12/2024, however it improved with diuretic therapy and breathing treatments.    Anasarca/hypoalbuminemia.  C. difficile  -CT imaging did show some minimal amount of ascites, there was third spacing noted.    -There is hepatomegaly. She also has ongoing colitis, she did have recent norovirus 2-1/2 weeks ago still with intermittent diarrhea.    -Did consult with GI, appreciate his recommendations.  -Patient did have subsequent C. difficile positive testing has been started on oral vancomycin on 6/12/2024.  -Patient discharged on p.o. vancomycin and lactobacillus  -Patient follow-up with GI as an outpatient    Hypomagnesemia/hypokalemia.  -Replacement as tolerated.    -Magnesium supplements ordered on discharge  -Continue spironolactone  -Needs labs repeated with PCP for monitoring    Respiratory failure, resolved  -I suspect related to volume overload, poss related to albumin infusion.    -She has diuresed well with Lasix and will continue her spironolactone.    -Patient was started down on her oxygen requirements and was saturating well above 90% on room air, was not requiring supplemental oxygen on discharge.  -Continued on spironolactone  -Follow-up with PCP    Debility  - Patient has a mobility limitation that significantly impairs their ability to participate in one or more mobility-related activities of daily living.  The patient is able to safely use the walker.  The functional mobility deficit can be sufficiently resolved by use of a walker.  -The patient is physically incapable of utilizing regular toilet facilities. Use of a bedside commode is necessary as they are confined to one level of home  with no toileting facilities available on that level.     Patient was seen and examined on the day of discharge.  Patient sitting up in the chair and appears very comfortable with no distress.  Patient reports feeling significantly better today  and is eager to go home.  Patient reports her diarrhea resolved, she denies any abdominal pain or discomfort.  She is tolerating p.o. well with no nausea or vomiting.  Patient with no other acute complaints.  She is ambulatory with no difficulty.  Declined home health and reports that she has her son at home who will help her.  Patient titrated down on her oxygen and was saturating well above 90% on room air. Patient instructed on management and plan in details.  Prescribed p.o. Vanco on discharge.  Will continue spironolactone and magnesium supplements.  Patient will need labs repeated with her PCP for monitoring. Patient to follow-up with PCP in 2 to 3 days for recheck and continued care. Clear return precautions discussed. Patient verbalized understanding and agreeable to plan.  All questions were answered to the patient's satisfaction. Patient has reached maximum medical improvement of inpatient hospital stay. Patient is discharged in stable condition.    Vital Signs:    Temp:  [98.2 °F (36.8 °C)-98.4 °F (36.9 °C)] 98.4 °F (36.9 °C)  Heart Rate:  [] 100  Resp:  [18-20] 18  BP: ()/(61-81) 108/73    Physical Exam:    General Appearance:  Alert and cooperative.  No acute distress.   Head:  Atraumatic and normocephalic.   Eyes: Conjunctivae and sclerae normal, no icterus. No pallor.   Ears:  Ears with no abnormalities noted.   Throat: No oral lesions, no thrush, oral mucosa moist.   Neck: Supple, trachea midline, no thyromegaly.   Back:   No kyphoscoliosis present. No tenderness to palpation.   Lungs:   Breath sounds heard bilaterally equally.  No crackles or wheezing. No Pleural rub or bronchial breathing.  Unlabored.  On room air.   Heart:  Normal S1 and S2, no murmur, no gallop, no rub. No JVD.   Abdomen:   Normal bowel sounds, no masses, no organomegaly. Soft, nontender, nondistended, no rebound tenderness.   Extremities: Supple, no edema, no cyanosis, no clubbing.   Pulses: Pulses palpable  bilaterally.   Skin: No bleeding or rash.   Neurologic: Alert and oriented x 3. No facial asymmetry. Moves all four limbs. No tremors.     Pertinent Lab Results:     Results from last 7 days   Lab Units 06/14/24  0632 06/13/24  0548 06/12/24  1624 06/12/24  0521 06/11/24  0646 06/10/24  2228   SODIUM mmol/L 138 136  --  136 137 136   POTASSIUM mmol/L 3.5 4.1 3.8 3.5 3.9 2.8*   CHLORIDE mmol/L 103 102  --  103 106 102   CO2 mmol/L 22.6 21.7*  --  20.6* 21.1* 20.1*   BUN mg/dL 7 4*  --  4* 4* 4*   CREATININE mg/dL 0.93 0.72  --  0.70 0.62 0.74   CALCIUM mg/dL 7.5* 7.2*  --  7.0* 7.0* 7.1*   BILIRUBIN mg/dL  --  0.5  --   --  0.4 0.3   ALK PHOS U/L  --  160*  --   --  243* 237*   ALT (SGPT) U/L  --  10  --   --  9 9   AST (SGOT) U/L  --  23  --   --  46* 34*   GLUCOSE mg/dL 96 171*  --  118* 112* 129*     Results from last 7 days   Lab Units 06/13/24  0548 06/12/24  0521 06/11/24  0646 06/10/24  2228   WBC 10*3/mm3  --   --  12.97* 14.30*   HEMOGLOBIN g/dL 7.6* 7.7* 9.3* 9.2*   HEMATOCRIT % 24.1* 24.9* 28.6* 28.6*   PLATELETS 10*3/mm3  --   --  245 247     Results from last 7 days   Lab Units 06/11/24  0646   INR  1.05     Results from last 7 days   Lab Units 06/11/24  0024 06/10/24  2228   HSTROP T ng/L 19* 19*     Results from last 7 days   Lab Units 06/10/24  2228   PROBNP pg/mL 2,249.0*             Results from last 7 days   Lab Units 06/13/24  0526   PH, ARTERIAL pH units 7.383   PO2 ART mm Hg 93.6   PCO2, ARTERIAL mm Hg 35.2   HCO3 ART mmol/L 20.9*           Pertinent Radiology Results:    Imaging Results (All)       Procedure Component Value Units Date/Time    XR Chest 1 View [[id number]] Collected: 06/13/24 0720     Updated: 06/13/24 0724    Narrative:      PROCEDURE: XR CHEST 1 VW-     HISTORY: hypoxia f/u; E87.6-Hypokalemia; E83.42-Hypomagnesemia;  R60.1-Generalized edema     COMPARISON: June 12, 2024..     FINDINGS: The heart is normal in size. There is bibasilar airspace  disease, atelectasis versus  infiltrate, worsened from prior exam. Small  right effusion again noted.. The mediastinum is unremarkable. There is  no pneumothorax.  There are no acute osseous abnormalities. Apical  lordotic positioning noted.        Impression:      Worsened bibasilar atelectasis or infiltrate, recommend  continued follow-up..              This report was signed and finalized on 6/13/2024 7:22 AM by Amna Segovia MD.       XR Chest 1 View [[id number]] Collected: 06/13/24 0023     Updated: 06/13/24 0025    Narrative:      FINAL REPORT    TECHNIQUE:  null    CLINICAL HISTORY:  hypoxia    COMPARISON:  null    FINDINGS:  Chest X-ray, 1 View    COMPARISON: None    FINDINGS:    Diffuse bilateral airspace disease with patchy areas of consolidation most pronounced in the right lung.    Blunting of the right costophrenic angle.    No pneumothorax.    No cardiomegaly.    No acute fracture.      Impression:      IMPRESSION:    Bilateral airspace disease with areas of consolidation, which could be due to pulmonary edema and/or pneumonia.    Right pleural effusion.    Authenticated and Electronically Signed by Aaron Vigil MD on  06/13/2024 12:23:23 AM    XR Chest 1 View [[id number]] Collected: 06/11/24 0822     Updated: 06/11/24 0824    Narrative:      PROCEDURE: XR CHEST 1 VW-     HISTORY: Anasarca     COMPARISON: None.     FINDINGS: The heart is borderline enlarged. The mediastinum is  unremarkable. The lungs are clear. There is no pneumothorax.  There are  no acute osseous abnormalities.       Impression:      No acute cardiopulmonary process.     Continued followup is recommended.                 Images were reviewed, interpreted, and dictated by Dr. Amna Segovia MD  Transcribed by Nicole Valdez PA-C.     This report was signed and finalized on 6/11/2024 8:22 AM by Amna Segovia MD.       CT Abdomen Pelvis Without Contrast [[id number]] Collected: 06/11/24 0736     Updated: 06/11/24 0743    Narrative:      PROCEDURE: CT ABDOMEN PELVIS WO  CONTRAST-     HISTORY: Anasarca and hypoalbuminemia.  Rule out liver cirrhosis.;  E87.6-Hypokalemia; E83.42-Hypomagnesemia; R60.1-Generalized edema     COMPARISON: None.     PROCEDURE: Axial images were obtained from the lung bases to the pubic  symphysis by computed tomography. This study was performed with  techniques to keep radiation doses as low as reasonably achievable,  (ALARA). Individualized dose reduction techniques using automated  exposure control or adjustment of mA and/or kV according to the patient  size were employed.     FINDINGS:     ABDOMEN: The lung bases are clear except for a 4 mm pleural-based nodule  lateral right lower lobe, no recommendation for follow-up per  Fleischner's criteria. There are a few linear densities in both lower  lobes, likely atelectasis. There is evidence of old calcified  granulomatous disease. No pericardial or pleural effusions identified.  The heart is proper size. The limited non-contrast images of the liver  demonstrate no focal abnormality. Liver is enlarged at 20 cm. No  nodularity of the contour is identified. Portal vein is within normal  limits in size at 14 mm. Gallbladder present with no CT visible stones.  The spleen is normal in size with no focal mass. Vascular calcifications  noted. Right adrenal gland is normal. There is mild fullness of the left  adrenal gland. Splenule is identified adjacent to the splenic hilum. The  aorta is normal in caliber. There is no significant free fluid or  adenopathy.  There is no nephrolithiasis. There is no hydronephrosis.  PELVIS: The GI tract demonstrate no obstruction. There is moderate wall  thickening of the ascending colon and proximal two-thirds of the  transverse colon suggesting colitis. No diverticula identified. There is  mild infiltration of the surrounding fat at the hepatic flexure. Minimal  fluid is identified in both paracolic gutters and the pelvis. Uterus is  midline. There is a small exophytic nodule  arising from the fundus,  likely an exophytic fibroid. The appendix is not identified. The urinary  bladder is almost completely collapsed. Small amount of high-attenuation  material identified in the cecum and distal descending colon, consider  minimal residual oral contrast. Diffuse subcutaneous edema identified.  There is no fluid or adenopathy.        Impression:      No hydronephrosis or nephrolithiasis.     Hepatomegaly with no other signs of cirrhosis, recommend correlation  with liver function tests.     Evidence of colitis involving the ascending and proximal two-thirds of  the transverse colon, consider infectious or inflammatory process with  ischemic colitis not excluded.     Minimal ascites with significant diffuse subcutaneous edema.     Suspected small uterine fibroid.     CTDI: 6.01 mGy  DLP:278.7 mGy.cm     This report was signed and finalized on 6/11/2024 7:41 AM by Amna Segovia MD.               Echo:    Results for orders placed during the hospital encounter of 06/10/24    Adult Transthoracic Echo Complete W/ Cont if Necessary Per Protocol    Interpretation Summary  1.  Normal left ventricular size and systolic function, 3D EF 69%.  2.  Grade 1 diastolic dysfunction.  3.  Normal right ventricular size and systolic function.  4.  Normal left atrial volume index.  5.  Mild mitral regurgitation.  6.  Mild tricuspid regurgitation.    Condition on Discharge:      Stable.    Code status during the hospital stay:    Code Status and Medical Interventions:   Ordered at: 06/11/24 0110     Code Status (Patient has no pulse and is not breathing):    CPR (Attempt to Resuscitate)     Medical Interventions (Patient has pulse or is breathing):    Full Support     Discharge Disposition:    Home or Self Care    Discharge Medications:       Discharge Medications        New Medications        Instructions Start Date   lactobacillus acidophilus capsule capsule   1 capsule, Oral, 2 Times Daily      magnesium oxide  400 MG tablet  Commonly known as: MAG-OX   400 mg, Oral, Daily      spironolactone 50 MG tablet  Commonly known as: ALDACTONE   50 mg, Oral, Daily   Start Date: Ysabel 15, 2024     vancomycin 125 MG capsule  Commonly known as: VANCOCIN   125 mg, Oral, Every 6 Hours Scheduled             Continue These Medications        Instructions Start Date   albuterol sulfate  (90 Base) MCG/ACT inhaler  Commonly known as: PROVENTIL HFA;VENTOLIN HFA;PROAIR HFA   1 puff, Inhalation, Every 6 Hours PRN      atorvastatin 10 MG tablet  Commonly known as: LIPITOR   1 tablet, Oral, Daily      cetaphil lotion   Topical, Daily      collagenase 250 UNIT/GM ointment   1 Application, Topical, Every 24 Hours Scheduled      cyclobenzaprine 5 MG tablet  Commonly known as: FLEXERIL   5 mg, Oral, 3 Times Daily PRN      fluticasone 110 MCG/ACT inhaler  Commonly known as: FLOVENT HFA   Inhale 1 puff twice daily.  Rinse mouth well after using.      fluticasone 50 MCG/ACT nasal spray  Commonly known as: FLONASE   2 sprays, Nasal, Daily      folic acid 1 MG tablet  Commonly known as: FOLVITE   1 mg, Oral, Daily      High Pot Multivitamin/Beta-Car tablet tablet  Generic drug: multivitamin with minerals   1 tablet, Oral, Daily      levocetirizine 5 MG tablet  Commonly known as: XYZAL   5 mg, Oral, Every Evening      loperamide 2 MG capsule  Commonly known as: IMODIUM   2 mg, Oral, 4 Times Daily PRN      montelukast 10 MG tablet  Commonly known as: SINGULAIR   10 mg, Oral, Nightly      Thiamine Mononitrate 100 MG tablet   100 mg, Oral, Daily      venlafaxine XR 75 MG 24 hr capsule  Commonly known as: EFFEXOR-XR   150 mg, Oral, Daily      vitamin D 1.25 MG (35562 UT) capsule capsule  Commonly known as: ERGOCALCIFEROL   Take 1 capsule by mouth Every 7 (Seven) Days.             Stop These Medications      progesterone 100 MG capsule  Commonly known as: PROMETRIUM     Progesterone 100 MG capsule  Commonly known as: PROMETRIUM            Discharge Diet:    Cardiac    Activity at Discharge:   As tolerated    Follow-up Appointments:     Follow-up Information       Aleksandra Andres APRN Follow up in 3 day(s).    Specialty: Family Medicine  Contact information:  [address] [place] [address]  [address]  [facility] [address] [phone]               Allen Santos MD Follow up in 2 week(s).    Specialty: Gastroenterology  Contact information:  [address]  [address]  [facility] [address] [phone]                           No future appointments.  Test Results Pending at Discharge:    Pending Labs       Order Current Status    Hemoglobin A1c In process               Santiago Mcmillan MD  06/14/24  15:30 EDT    Time: I spent 27 minutes on this discharge activity which included: face-to-face encounter with the patient, reviewing the data in the system, coordination of the care with the nursing staff as well as consultants, documentation, and entering orders.     Dictated utilizing Dragon dictation.      Electronically signed by Santiago Mcmillan MD at 06/14/24 2685

## 2024-06-17 NOTE — OUTREACH NOTE
Call Center TCM Note      Flowsheet Row Responses   [facility] [place] patient discharged from? [facility]   Does the patient have one of the following disease processes/diagnoses(primary or secondary)? Other   TCM attempt successful? No   Unsuccessful attempts Attempt 1   Call Status Voice mail issues            Amanda De La Cruz RN    6/17/2024, 09:44 EDT

## 2024-06-18 ENCOUNTER — TRANSITIONAL CARE MANAGEMENT TELEPHONE ENCOUNTER (OUTPATIENT)
Dept: CALL CENTER | Facility: HOSPITAL | Age: 59
End: 2024-06-18
Payer: MEDICAID

## 2024-06-18 NOTE — OUTREACH NOTE
Call Center TCM Note      Flowsheet Row Responses   [facility] facility patient discharged from? [facility]   Does the patient have one of the following disease processes/diagnoses(primary or secondary)? Other   TCM attempt successful? No   Unsuccessful attempts Attempt 3            Amanda De La Cruz RN    6/18/2024, 15:29 EDT

## 2024-06-26 ENCOUNTER — OFFICE VISIT (OUTPATIENT)
Dept: INTERNAL MEDICINE | Facility: CLINIC | Age: 59
End: 2024-06-26
Payer: MEDICAID

## 2024-06-26 VITALS
TEMPERATURE: 98.8 F | HEIGHT: 64 IN | HEART RATE: 126 BPM | SYSTOLIC BLOOD PRESSURE: 128 MMHG | BODY MASS INDEX: 29.37 KG/M2 | DIASTOLIC BLOOD PRESSURE: 76 MMHG | WEIGHT: 172 LBS | OXYGEN SATURATION: 97 %

## 2024-06-26 DIAGNOSIS — E87.6 HYPOKALEMIA: ICD-10-CM

## 2024-06-26 DIAGNOSIS — E83.42 HYPOMAGNESEMIA: ICD-10-CM

## 2024-06-26 DIAGNOSIS — Z12.31 ENCOUNTER FOR SCREENING MAMMOGRAM FOR MALIGNANT NEOPLASM OF BREAST: ICD-10-CM

## 2024-06-26 DIAGNOSIS — Z76.89 ENCOUNTER TO ESTABLISH CARE: Primary | ICD-10-CM

## 2024-06-26 DIAGNOSIS — E83.51 HYPOCALCEMIA: ICD-10-CM

## 2024-06-26 DIAGNOSIS — J31.0 CHRONIC RHINITIS: ICD-10-CM

## 2024-06-26 DIAGNOSIS — H65.113 ACUTE ALLERGIC SEROUS OTITIS MEDIA, BILATERAL: ICD-10-CM

## 2024-06-26 DIAGNOSIS — R16.0 ENLARGED LIVER: ICD-10-CM

## 2024-06-26 DIAGNOSIS — M54.2 CERVICALGIA: ICD-10-CM

## 2024-06-26 DIAGNOSIS — Z09 HOSPITAL DISCHARGE FOLLOW-UP: ICD-10-CM

## 2024-06-26 DIAGNOSIS — M62.838 MUSCLE SPASM: ICD-10-CM

## 2024-06-26 DIAGNOSIS — R74.8 ELEVATED ALKALINE PHOSPHATASE LEVEL: ICD-10-CM

## 2024-06-26 RX ORDER — ATORVASTATIN CALCIUM 10 MG/1
10 TABLET, FILM COATED ORAL DAILY
Qty: 90 TABLET | Refills: 3 | Status: SHIPPED | OUTPATIENT
Start: 2024-06-26

## 2024-06-26 RX ORDER — CYCLOBENZAPRINE HCL 5 MG
5 TABLET ORAL 3 TIMES DAILY PRN
Qty: 60 TABLET | Refills: 1 | Status: SHIPPED | OUTPATIENT
Start: 2024-06-26

## 2024-06-26 RX ORDER — SPIRONOLACTONE 100 MG/1
100 TABLET, FILM COATED ORAL DAILY
Qty: 90 TABLET | Refills: 1 | Status: SHIPPED | OUTPATIENT
Start: 2024-06-26

## 2024-06-26 RX ORDER — FOLIC ACID 1 MG/1
1 TABLET ORAL DAILY
Qty: 90 TABLET | Refills: 1 | Status: SHIPPED | OUTPATIENT
Start: 2024-06-26

## 2024-06-26 RX ORDER — ALBUTEROL SULFATE 90 UG/1
1 AEROSOL, METERED RESPIRATORY (INHALATION) EVERY 6 HOURS PRN
Qty: 18 G | Refills: 3 | Status: SHIPPED | OUTPATIENT
Start: 2024-06-26

## 2024-06-26 RX ORDER — LEVOCETIRIZINE DIHYDROCHLORIDE 5 MG/1
5 TABLET, FILM COATED ORAL EVERY EVENING
Qty: 90 TABLET | Refills: 1 | Status: SHIPPED | OUTPATIENT
Start: 2024-06-26

## 2024-06-26 RX ORDER — MONTELUKAST SODIUM 10 MG/1
10 TABLET ORAL NIGHTLY
Qty: 90 TABLET | Refills: 1 | Status: SHIPPED | OUTPATIENT
Start: 2024-06-26

## 2024-06-26 RX ORDER — THIAMINE MONONITRATE (VIT B1) 100 MG
100 TABLET ORAL DAILY
Qty: 90 TABLET | Refills: 1 | Status: SHIPPED | OUTPATIENT
Start: 2024-06-26

## 2024-06-26 NOTE — PROGRESS NOTES
Subjective     Nicole Silva is a 58 y.o. female who presents to establish care and obtain hospital follow up.         Current outpatient and discharge medications have been reconciled for the patient.    History of Present Illness   Course During Hospital Stay: Admitted to [facility] 6/10/2024 after presenting to ED with with c/o arm and leg swelling.  Past medical history significant for HTN, COPD.  Hospitalized previously at [facility] 5/28/2024 through 5/31/2024 for left leg nonhealing wound with cellulitis.  After discharge bilateral upper extremity swelling progressively worsened and spread to abdomen and bilateral lower extremities.  Endorsed exertional dyspnea and mild chest discomfort without orthopnea or PND.  Prior history of alcohol abuse.  Elevated proBNP, creatinine 0.7, WBC 14, Hgb 9.2.  CT imaging concerning for fluid overload with notable hypomagnesemia and hypokalemia.  Admitted for observation and further evaluation.  Initially started on diuretics.  GI consult made.  Positive C. difficile, started on oral vancomycin.  Unfortunately developed evidence of likely fluid overload on 6/12/2024 that improved with diuretic therapy and breathing treatments. Respiratory failure due to fluid overload, resolved during admission.  Weaned off oxygen, tolerating room air.  Anasarca/hypoalbuminemia, C. difficile.  CT imaging showed minimal amount of ascites, third spacing, hepatomegaly.  Diarrhea resolved prior to discharge.  Hypomagnesemia/hypokalemia.  Magnesium supplements ordered on discharge.  To continue spironolactone.  Debility, significant mobility limitation.  Using walker and is incapable of using regular toilet facilities in her home, use of a bedside commode necessary as she is confined to 1 level of the home with no toileting facilities available on that level.  Discharged on 6/14/2024 with p.o. vancomycin, magnesium supplements and spironolactone.  To follow-up with gastroenterology,   "Danielle.  Was to see PCP, Yenifer Andres, 2-3 days after discharge.      Eating home grown foods.  Does not eat refined sugar    Plans to schedule a dental exam soon, not currently UTD. Broke a tooth a couple of months ago.  Brushes twice a day.    Needs new glasses, plans to schedule an appointment.     Not currently sexually active. Heterosexual.   > 30 years.  No history of physical, emotional or sexual abuse.    Para 2,  2.   Last pap smear ~ 18 months ago.      Sleeping in recliner, difficult to get up out of bed with heavy, swollen legs.      The following portions of the patient's history were reviewed and updated as appropriate: allergies, current medications, past family history, past medical history, past social history, past surgical history, and problem list.    Review of Systems   All other systems reviewed and are negative.      Objective   /76   Pulse (!) 126   Temp 98.8 °F (37.1 °C)   Ht 162.6 cm (64.02\")   Wt 78 kg (172 lb)   SpO2 97%   BMI 29.51 kg/m²   Physical Exam  Constitutional:       Appearance: She is not ill-appearing.   HENT:      Head: Normocephalic.      Right Ear: External ear normal.      Left Ear: External ear normal.   Eyes:      Conjunctiva/sclera: Conjunctivae normal.      Pupils: Pupils are equal, round, and reactive to light.   Cardiovascular:      Rate and Rhythm: Normal rate and regular rhythm.      Pulses:           Radial pulses are 2+ on the right side and 2+ on the left side.        Dorsalis pedis pulses are 1+ on the right side and 1+ on the left side.      Heart sounds: Normal heart sounds.   Pulmonary:      Effort: Pulmonary effort is normal.      Breath sounds: Normal breath sounds.   Musculoskeletal:      Cervical back: Normal range of motion and neck supple.      Right lower le+      Left lower le+   Skin:     General: Skin is warm.      Capillary Refill: Capillary refill takes less than 2 seconds.   Neurological:      Mental Status: She " is alert and oriented to person, place, and time.      Coordination: Coordination normal.      Gait: Gait normal.   Psychiatric:         Mood and Affect: Mood normal.         Behavior: Behavior normal.         Thought Content: Thought content normal.         Assessment & Plan   Diagnoses and all orders for this visit:    1. Encounter to establish care (Primary)    2. Hospital discharge follow-up       - Continue spironolactone as prescribed. Refill sent.      3. Chronic rhinitis  -     montelukast (SINGULAIR) 10 MG tablet; Take 1 tablet by mouth Every Night.  Dispense: 90 tablet; Refill: 1    4. Acute allergic serous otitis media, bilateral  -     levocetirizine (XYZAL) 5 MG tablet; Take 1 tablet by mouth Every Evening.  Dispense: 90 tablet; Refill: 1    5. Cervicalgia    6. Enlarged liver  -     Comprehensive Metabolic Panel  -     Gamma GT; Future    7. Muscle spasm  -     cyclobenzaprine (FLEXERIL) 5 MG tablet; Take 1 tablet by mouth 3 (Three) Times a Day As Needed for Muscle Spasms.  Dispense: 60 tablet; Refill: 1    8. Hypocalcemia  -     PTH, Intact; Future  -     Vitamin D 25 hydroxy; Future    9. Hypokalemia  -     Comprehensive Metabolic Panel    10. Elevated alkaline phosphatase level  -     Gamma GT; Future    11. Hypomagnesemia  -     Magnesium  -     PTH, Intact; Future    12. Encounter for screening mammogram for malignant neoplasm of breast  -     Mammo screening digital tomosynthesis bilateral w CAD; Future

## 2024-06-27 LAB
ALBUMIN SERPL-MCNC: 3.3 G/DL (ref 3.5–5.2)
ALBUMIN/GLOB SERPL: 1.5 G/DL
ALP SERPL-CCNC: 218 U/L (ref 39–117)
ALT SERPL-CCNC: 12 U/L (ref 1–33)
AST SERPL-CCNC: 42 U/L (ref 1–32)
BILIRUB SERPL-MCNC: 0.3 MG/DL (ref 0–1.2)
BUN SERPL-MCNC: 9 MG/DL (ref 6–20)
BUN/CREAT SERPL: 12.9 (ref 7–25)
CALCIUM SERPL-MCNC: 8.3 MG/DL (ref 8.6–10.5)
CHLORIDE SERPL-SCNC: 102 MMOL/L (ref 98–107)
CO2 SERPL-SCNC: 25.7 MMOL/L (ref 22–29)
CREAT SERPL-MCNC: 0.7 MG/DL (ref 0.57–1)
EGFRCR SERPLBLD CKD-EPI 2021: 101 ML/MIN/1.73
GLOBULIN SER CALC-MCNC: 2.2 GM/DL
GLUCOSE SERPL-MCNC: 106 MG/DL (ref 65–99)
MAGNESIUM SERPL-MCNC: 1.4 MG/DL (ref 1.6–2.6)
POTASSIUM SERPL-SCNC: 4 MMOL/L (ref 3.5–5.2)
PROT SERPL-MCNC: 5.5 G/DL (ref 6–8.5)
SODIUM SERPL-SCNC: 139 MMOL/L (ref 136–145)

## 2024-06-28 NOTE — PROGRESS NOTES
Magnesium continues to be a little bit low.  Take daily supplement.  Calcium is still low.  Would like to check PTH to be certain parathyroid function is normal.    Would also like to check GGT for liver function since alk phos and AST are trending upward again.   Potassium is normal.

## 2024-07-07 ENCOUNTER — HOSPITAL ENCOUNTER (EMERGENCY)
Facility: HOSPITAL | Age: 59
Discharge: OTHER INSTITUTION | End: 2024-07-07
Payer: MEDICAID

## 2024-07-07 VITALS — BODY MASS INDEX: 28.17 KG/M2 | WEIGHT: 165 LBS | HEIGHT: 64 IN

## 2024-07-07 DIAGNOSIS — I46.9 CARDIOPULMONARY ARREST (HCC): Primary | ICD-10-CM

## 2024-07-07 PROCEDURE — 92950 HEART/LUNG RESUSCITATION CPR: CPT

## 2024-07-07 PROCEDURE — 99285 EMERGENCY DEPT VISIT HI MDM: CPT

## 2024-07-07 PROCEDURE — 31500 INSERT EMERGENCY AIRWAY: CPT

## 2024-07-07 RX ORDER — AMIODARONE HYDROCHLORIDE 150 MG/3ML
INJECTION, SOLUTION INTRAVENOUS
Status: DISCONTINUED
Start: 2024-07-07 | End: 2024-07-07 | Stop reason: HOSPADM

## 2024-07-07 RX ORDER — EPINEPHRINE 0.1 MG/ML
INJECTION INTRAVENOUS
Status: DISCONTINUED
Start: 2024-07-07 | End: 2024-07-07 | Stop reason: HOSPADM

## 2024-07-07 NOTE — ED NOTES
Called [facility], spoke with Surekha. Informed her that the  home does have cooling capabilities and that Pt's daughter called to tell me that she did not wish to continue with the organ donation process.     Surekha asked to inform  home representative that [facility] will no longer be following this case.     OK to release body to  home.

## 2024-07-07 NOTE — ED NOTES
Sophy Luo from [facility] called.     Someone will be here to  Pt soon.     Sophy advises that they do have cooling capabilities at their facility.     She also states that Pt's daughter, Ynes, does not want to continue with the organ donation process.

## 2024-07-07 NOTE — ED NOTES
Report via phone from José Miguel Mcgrath, Paramedic with  EMS.     57 y/o F   Cardiac Arrest  CPR on-going greater than 30min.   Initial cardiac rhythm VFib, Pt now in PEA, continues to be pulseless.   6 Epi administered; iGel placed   ETCo2 43; SPO2 72%

## 2024-07-07 NOTE — ED NOTES
CPR paused and cardiac rhythm assessed; Pt remains on EMS cardiac monitor. Rhythm determined to be PEA by Dr. Gonzalez.

## 2024-07-07 NOTE — ED PROVIDER NOTES
called        I am the Primary Clinician of Record.    FINAL IMPRESSION      1. Cardiopulmonary arrest (HCC)          DISPOSITION/PLAN     DISPOSITION  2024 04:17:50 AM      PATIENT REFERRED TO:  No follow-up provider specified.    DISCHARGE MEDICATIONS:  New Prescriptions    No medications on file       DISCONTINUED MEDICATIONS:  Discontinued Medications    No medications on file              (Please note that portions of this note were completed with a voice recognition program.  Efforts were made to edit the dictations but occasionally words are mis-transcribed.)    Vladimir Gonzalez DO (electronically signed)            Vladimir Gonzalez DO  [id number]

## 2024-07-07 NOTE — ED TRIAGE NOTES
Pt arrived to ED via [facility] in cardiac arrest. Pt was receiving mechanical CPR/Chest Compressions via [facility]. Pt was being ventilated with a BVM and an iGel. iGel had been placed by EMS. Pt received a total of 9 rounds of Epi and 300mg of Amiodarone. Per EMS Pt's initial cardiac rhythm was V-Fib. Pt was defibrillated. Pt then presented in PEA. Pt continued to be pulseless during EMS care and transport.     EMS states that family witnessed arrests. Son at residence heard Pt making \"noise\" and witnessed what appeared to be a \"seizure\". Pt then collapsed, falling to the ground. Family did initiate CPR and was performing CPR when EMS arrived at the home.

## 2024-07-07 NOTE — ED NOTES
Case initiated with [facility]. Spoke with Surekha Jauregui. [facility] will call back when it is okay to release the body to the  home.

## 2024-07-07 NOTE — ED NOTES
Spoke with Pt's daughter, Ynes, who informed me over the phone that she does not want to follow-up and proceed with organ donation process. I explained to her that I will have [facility] contact her to cancel the process.

## 2024-07-07 NOTE — ED NOTES
Surekha with [facility] called; paperwork will be initiated at 0700.     [facility] case #[id number]    Ok to release body to  home if they have cooling capabilities.

## 2024-07-19 NOTE — PAYOR COMM NOTE
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    I/O last 3 completed shifts:  In: 120 [P.O.:120]  Out: -   I/O this shift:  In: 360 [P.O.:360]  Out: -     Physical Examination:    General Appearance:  Alert and cooperative.  NAD   Head:  Atraumatic and normocephalic.   Eyes: Conjunctivae and sclerae normal, no icterus. No pallor.   Throat:  No oral lesions, no thrush, oral mucosa moist.   Neck: Supple, trachea midline, no thyromegaly.   Lungs:   Scattered crackles nontachypneic at rest.  Nasal cannula oxygen in place   Heart:  Normal S1 and S2, no murmur, no gallop, no rub. No JVD.   Abdomen:   Normal bowel sounds, no masses, no organomegaly. Soft, nontender, nondistended, no rebound tenderness.   Extremities: Supple, 2-3+ edema, no cyanosis, no clubbing.  Chronic appearing wound of the left lower extremity   Skin: No bleeding or rash.   Neurologic: Alert and oriented x 3. No facial asymmetry. Moves all four limbs. No tremors.      Laboratory results:    Results from last 7 days   Lab Units 06/13/24  0548 06/12/24  1624 06/12/24  0521 06/11/24  0646 06/10/24  2228   SODIUM mmol/L 136  --  136 137 136   POTASSIUM mmol/L 4.1 3.8 3.5 3.9 2.8*   CHLORIDE mmol/L 102  --  103 106 102   CO2 mmol/L 21.7*  --  20.6* 21.1* 20.1*   BUN mg/dL 4*  --  4* 4* 4*   CREATININE mg/dL 0.72  --  0.70 0.62 0.74   CALCIUM mg/dL 7.2*  --  7.0* 7.0* 7.1*   BILIRUBIN mg/dL 0.5  --   --  0.4 0.3   ALK PHOS U/L 160*  --   --  243* 237*   ALT (SGPT) U/L 10  --   --  9 9   AST (SGOT) U/L 23  --   --  46* 34*   GLUCOSE mg/dL 171*  --  118* 112* 129*     Results from last 7 days   Lab Units 06/13/24  0548 06/12/24  0521 06/11/24  0646 06/10/24  2228   WBC 10*3/mm3  --   --  12.97* 14.30*   HEMOGLOBIN g/dL 7.6* 7.7* 9.3* 9.2*   HEMATOCRIT % 24.1* 24.9* 28.6* 28.6*   PLATELETS 10*3/mm3  --   --  245 247     Results from last 7 days   Lab Units 06/11/24  0646   INR  1.05     Results from last 7 days   Lab Units 06/11/24  0024 06/10/24  2228   HSTROP T ng/L 19* 19*         Recent Labs     06/13/24  0001 06/13/24  0526   PHART 7.244* 7.383   RAV6FOV 46.2* 35.2   PO2ART 65.7* 93.6   [id number] 20.0* 20.9*   BASEEXCESS -7.0* -3.7*      I have reviewed the patient's laboratory results.    Radiology results:    XR Chest 1 View    Result Date: 6/13/2024  PROCEDURE: XR CHEST 1 VW-  HISTORY:  hypoxia f/u; E87.6-Hypokalemia; E83.42-Hypomagnesemia; R60.1-Generalized edema  COMPARISON: June 12, 2024..  FINDINGS: The heart is normal in size. There is bibasilar airspace disease, atelectasis versus infiltrate, worsened from prior exam. Small right effusion again noted.. The mediastinum is unremarkable. There is no pneumothorax.  There are no acute osseous abnormalities. Apical lordotic positioning noted.      Impression: Worsened bibasilar atelectasis or infiltrate, recommend continued follow-up..     This report was signed and finalized on 6/13/2024 7:22 AM by Amna Segovia MD.      XR Chest 1 View    Result Date: 6/13/2024  FINAL REPORT TECHNIQUE: null CLINICAL HISTORY: hypoxia COMPARISON: null FINDINGS: Chest X-ray, 1 View COMPARISON: None FINDINGS: Diffuse bilateral airspace disease with patchy areas of consolidation most pronounced in the right lung. Blunting of the right costophrenic angle. No pneumothorax. No cardiomegaly. No acute fracture.     Impression: IMPRESSION: Bilateral airspace disease with areas of consolidation, which could be due to pulmonary edema and/or pneumonia. Right pleural effusion. Authenticated and Electronically Signed by Aaron Vigil MD on 06/13/2024 12:23:23 AM   I have reviewed the patient's radiology reports.    Medication Review:     I have reviewed the patient's active and prn medications.     Problem List:      Anasarca    Hypokalemia    Hypomagnesemia    Hypoalbuminemia      Assessment:    Anasarca with hypoalbuminemia, suspected liver disease.  C. difficile colitis  Suspected alcoholic versus fatty liver disease.  Hypokalemia and hypomagnesemia, POA.  COPD, no exacerbation.  Essential hypertension.  History of alcohol abuse.  Recent history of left foot laceration, healing.    Plan:    Anasarca/hypoalbuminemia.  CT imaging did show some minimal amount of ascites, there was third spacing noted.  There is hepatomegaly. She also has ongoing colitis, she did have recent  norovirus 2-1/2 weeks ago still with intermittent diarrhea.      Did consult with GI, appreciate his recommendations.    Patient did have subsequent C. difficile positive testing has been started on oral vancomycin on 2024.     Hypomagnesemia/hypokalemia.  Replacement as tolerated.  She is on diuretic therapy currently.  Will need monitored    Respiratory failure:  I suspect related to volume overload, poss related to albumin infusion.  She has diuresed well with the increase Lasix and will continue her spironolactone.  Hopefully will come off oxygen by tomorrow.    Possible discharge tomorrow    DVT Prophylaxis: Enoxaparin  Code Status: Full  Diet: Cardiac  Discharge Plan: Discharge tomorrow    Pat Alatorre DO  24  16:44 EDT    Dictated utilizing Dragon dictation.      Electronically signed by Pat Alatorre DO at 24 1645          Consult Notes (most recent note)        Allen Santos MD at 24 1641        Consult Orders    1. Inpatient Gastroenterology Consult [[id number]] ordered by Pat Alatorre DO at 24 1440                      In Patient Consult      Date of Consultation: 2024  Patient Name: Nicole Silva  MRN: [id number]  : 1965     Referring provider: Pat Alatorre, *    Primary care provider:  Aleksandra Andres, MARV[facility]    Reason for consultation: Abnormal GI imaging, diarrhea, profound hypoalbuminemia    History of Present Illness: 58-year-old female who was recently admitted to the hospital for nonhealing left foot laceration and cellulitis, came to the emergency room with anasarca, lower extremity edema, arm swelling.    She states that she had had an increase in her weight, some abdominal distention as well.    She does have significant lower extremity edema, and some generalized edema/anasarca.    She is profoundly hypoalbuminemic.  Even on admission in late May she had an albumin of 2.1.  She was also profoundly magnesium  depleted, with a magnesium of 0.9.    She does have significant diarrhea, which is lasted for at least 2 months.  She did test positive for norovirus.    There was some hepatomegaly noted on CT scan along with anasarca.  No prior diagnosis of cirrhosis.  Echocardiogram done today which is unremarkable for any significant CHF.  Renal function is within normal limits.  She does have a history of heavy alcohol use, but not significant within the past few months.    Subjective     Past Medical History:   Diagnosis Date    COPD (chronic obstructive pulmonary disease)     Hypertension        Past Surgical History:   Procedure Laterality Date     SECTION  ,       Family History   Problem Relation Age of Onset    Ovarian cancer Mother     Breast cancer Neg Hx        Social History     Socioeconomic History    Marital status:    Tobacco Use    Smoking status: Former     Types: Cigarettes     Passive exposure: Past    Smokeless tobacco: Never   Vaping Use    Vaping status: Never Used   Substance and Sexual Activity    Alcohol use: No    Drug use: No    Sexual activity: Yes     Partners: Male     Birth control/protection: Post-menopausal         Current Facility-Administered Medications:     acetaminophen (TYLENOL) tablet 650 mg, 650 mg, Oral, Q4H PRN **OR** acetaminophen (TYLENOL) 160 MG/5ML oral solution 650 mg, 650 mg, Oral, Q4H PRN **OR** acetaminophen (TYLENOL) suppository 650 mg, 650 mg, Rectal, Q4H PRN, Darek Berger MD    albumin human 25 % IV SOLN 25 g, 25 g, Intravenous, TID, Allen Santos MD    atorvastatin (LIPITOR) tablet 10 mg, 10 mg, Oral, Daily, Darek Berger MD, 10 mg at 24 0939    sennosides-docusate (PERICOLACE) 8.6-50 MG per tablet 2 tablet, 2 tablet, Oral, BID **AND** polyethylene glycol (MIRALAX) packet 17 g, 17 g, Oral, Daily PRN **AND** bisacodyl (DULCOLAX) EC tablet 5 mg, 5 mg, Oral, Daily PRN **AND** bisacodyl (DULCOLAX) suppository 10 mg, 10 mg, Rectal, Daily PRN, Celine  MD Darek    budesonide (PULMICORT) nebulizer solution 0.5 mg, 0.5 mg, Nebulization, BID - RT, Darek Berger MD, 0.5 mg at 06/11/24 0654    cyclobenzaprine (FLEXERIL) tablet 5 mg, 5 mg, Oral, TID PRN, Darek Berger MD    Enoxaparin Sodium (LOVENOX) syringe 40 mg, 40 mg, Subcutaneous, Q24H, Darek Berger MD    fluticasone (FLONASE) 50 MCG/ACT nasal spray 2 spray, 2 spray, Nasal, Daily, Darek Berger MD, 2 spray at 06/11/24 0939    folic acid (FOLVITE) tablet 1 mg, 1 mg, Oral, Daily, Darek Berger MD, 1 mg at 06/11/24 0942    furosemide (LASIX) injection 20 mg, 20 mg, Intravenous, Daily, Darek Berger MD, 20 mg at 06/11/24 0942    ipratropium-albuterol (DUO-NEB) nebulizer solution 3 mL, 3 mL, Nebulization, Q4H PRN, Darek Berger MD    Magnesium Standard Dose Replacement - Follow Nurse / BPA Driven Protocol, , Does not apply, PRN, Darek Berger MD    montelukast (SINGULAIR) tablet 10 mg, 10 mg, Oral, Nightly, Darek Berger MD    ondansetron (ZOFRAN) injection 4 mg, 4 mg, Intravenous, Q6H PRN, Darek Berger MD    Pharmacy to Dose enoxaparin (LOVENOX), , Does not apply, Continuous PRN, Darek Berger MD    Potassium Replacement - Follow Nurse / BPA Driven Protocol, , Does not apply, PRN, Darek Berger MD    sodium chloride 0.9 % flush 10 mL, 10 mL, Intravenous, Q12H, Darek Berger MD, 10 mL at 06/11/24 0943    sodium chloride 0.9 % flush 10 mL, 10 mL, Intravenous, PRN, Darek Berger MD    sodium chloride 0.9 % infusion 40 mL, 40 mL, Intravenous, PRN, Darek Berger MD    spironolactone (ALDACTONE) tablet 50 mg, 50 mg, Oral, Daily, Darek Berger MD, 50 mg at 06/11/24 0939    thiamine (VITAMIN B-1) tablet 100 mg, 100 mg, Oral, Daily, Darek Berger MD, 100 mg at 06/11/24 0939    venlafaxine XR (EFFEXOR-XR) 24 hr capsule 150 mg, 150 mg, Oral, Daily With Breakfast, Darek Berger MD, 150 mg at 06/11/24 0810    No Known Allergies    Review of Systems  See HPI above.  Otherwise negative.    The following portions of the patient's  "history were reviewed and updated as appropriate: allergies, current medications, past family history, past medical history, past social history, past surgical history and problem list.    Objective     Vitals:    06/11/24 1144 06/11/24 1210 06/11/24 1520 06/11/24 1541   BP:  127/80 127/80    BP Location:  Left arm     Patient Position:  Lying     Pulse: 105 101     Resp:  18  18   Temp:  98.1 °F (36.7 °C)  98.3 °F (36.8 °C)   TempSrc:  Oral  Oral   SpO2: 94%      Weight:   65.8 kg (145 lb 1 oz)    Height:   162.6 cm (64.02\")        Physical Exam  Constitutional:       General: She is not in acute distress.     Appearance: Normal appearance.   HENT:      Head: Normocephalic and atraumatic.      Mouth/Throat:      Mouth: Mucous membranes are moist.   Eyes:      General: No scleral icterus.     Conjunctiva/sclera: Conjunctivae normal.   Cardiovascular:      Rate and Rhythm: Normal rate.   Pulmonary:      Effort: Pulmonary effort is normal. No respiratory distress.   Abdominal:      General: There is no distension.      Tenderness: There is no abdominal tenderness.   Musculoskeletal:         General: Swelling present. No deformity or signs of injury.      Right lower leg: Edema present.      Left lower leg: Edema present.   Skin:     Coloration: Skin is not jaundiced or pale.   Neurological:      General: No focal deficit present.      Mental Status: She is alert and oriented to person, place, and time.   Psychiatric:         Mood and Affect: Mood normal.         Behavior: Behavior normal.         Results from last 7 days   Lab Units 06/11/24  0646 06/10/24  2228   SODIUM mmol/L 137 136   POTASSIUM mmol/L 3.9 2.8*   CHLORIDE mmol/L 106 102   CO2 mmol/L 21.1* 20.1*   BUN mg/dL 4* 4*   CREATININE mg/dL 0.62 0.74   CALCIUM mg/dL 7.0* 7.1*   ALBUMIN g/dL 2.3* 2.4*   BILIRUBIN mg/dL 0.4 0.3   ALK PHOS U/L 243* 237*   ALT (SGPT) U/L 9 9   AST (SGOT) U/L 46* 34*   GLUCOSE mg/dL 112* 129*   WBC 10*3/mm3 12.97* 14.30* "   HEMOGLOBIN g/dL 9.3* 9.2*   PLATELETS 10*3/mm3 245 247   INR  1.05  --        Imaging Results (Last 24 Hours)       Procedure Component Value Units Date/Time    XR Chest 1 View [[id number]] Collected: 06/11/24 0822     Updated: 06/11/24 0824    Narrative:      PROCEDURE: XR CHEST 1 VW-     HISTORY: Anasarca     COMPARISON: None.     FINDINGS: The heart is borderline enlarged. The mediastinum is  unremarkable. The lungs are clear. There is no pneumothorax.  There are  no acute osseous abnormalities.       Impression:      No acute cardiopulmonary process.     Continued followup is recommended.                 Images were reviewed, interpreted, and dictated by Dr. Amna Segovia MD  Transcribed by Nicole Valdez PA-C.     This report was signed and finalized on 6/11/2024 8:22 AM by Amna Segovia MD.       CT Abdomen Pelvis Without Contrast [[id number]] Collected: 06/11/24 0736     Updated: 06/11/24 0743    Narrative:      PROCEDURE: CT ABDOMEN PELVIS WO CONTRAST-     HISTORY: Anasarca and hypoalbuminemia.  Rule out liver cirrhosis.;  E87.6-Hypokalemia; E83.42-Hypomagnesemia; R60.1-Generalized edema     COMPARISON: None.     PROCEDURE: Axial images were obtained from the lung bases to the pubic  symphysis by computed tomography. This study was performed with  techniques to keep radiation doses as low as reasonably achievable,  (ALARA). Individualized dose reduction techniques using automated  exposure control or adjustment of mA and/or kV according to the patient  size were employed.     FINDINGS:     ABDOMEN: The lung bases are clear except for a 4 mm pleural-based nodule  lateral right lower lobe, no recommendation for follow-up per  Fleischner's criteria. There are a few linear densities in both lower  lobes, likely atelectasis. There is evidence of old calcified  granulomatous disease. No pericardial or pleural effusions identified.  The heart is proper size. The limited non-contrast images of the liver  demonstrate  no focal abnormality. Liver is enlarged at 20 cm. No  nodularity of the contour is identified. Portal vein is within normal  limits in size at 14 mm. Gallbladder present with no CT visible stones.  The spleen is normal in size with no focal mass. Vascular calcifications  noted. Right adrenal gland is normal. There is mild fullness of the left  adrenal gland. Splenule is identified adjacent to the splenic hilum. The  aorta is normal in caliber. There is no significant free fluid or  adenopathy.  There is no nephrolithiasis. There is no hydronephrosis.  PELVIS: The GI tract demonstrate no obstruction. There is moderate wall  thickening of the ascending colon and proximal two-thirds of the  transverse colon suggesting colitis. No diverticula identified. There is  mild infiltration of the surrounding fat at the hepatic flexure. Minimal  fluid is identified in both paracolic gutters and the pelvis. Uterus is  midline. There is a small exophytic nodule arising from the fundus,  likely an exophytic fibroid. The appendix is not identified. The urinary  bladder is almost completely collapsed. Small amount of high-attenuation  material identified in the cecum and distal descending colon, consider  minimal residual oral contrast. Diffuse subcutaneous edema identified.  There is no fluid or adenopathy.        Impression:      No hydronephrosis or nephrolithiasis.     Hepatomegaly with no other signs of cirrhosis, recommend correlation  with liver function tests.     Evidence of colitis involving the ascending and proximal two-thirds of  the transverse colon, consider infectious or inflammatory process with  ischemic colitis not excluded.     Minimal ascites with significant diffuse subcutaneous edema.     Suspected small uterine fibroid.     CTDI: 6.01 mGy  DLP:278.7 mGy.cm     This report was signed and finalized on 6/11/2024 7:41 AM by Amna Segovia MD.               Assessment / Plan      Assessment/Recommendations:    Principal Problem:    Anasarca  Active Problems:    Hypokalemia    Hypomagnesemia    Hypoalbuminemia      While it is possible that early portal hypertension/cirrhosis could be causing this sort of picture.  She is not thrombocytopenic.  Her LFTs are unremarkable.  It is also possible however that she has just had significant protein loss from this profound diarrhea compounded with some degree of poor nutrition.    Repeat GI panel has been ordered.    Suggest that we replete the albumin.  Should likely help with her anasarca and edema.    Needs aggressive electrolyte replacement.  Also needs antidiarrheal treatment.    Will follow.  Etiology is not clear    Thank you very much for letting me participate in the care of this patient.  Please do not hesitate to call me if you have any questions.    Allen Santos MD  [facility]  6/11/2024  17:53 EDT    Part of this note may be an electronic transcription/translation of spoken language to printed text using the Dragon Dictation System.      Electronically signed by Allen Santos MD at 06/11/24 0987